# Patient Record
Sex: MALE | Race: WHITE | Employment: OTHER | URBAN - NONMETROPOLITAN AREA
[De-identification: names, ages, dates, MRNs, and addresses within clinical notes are randomized per-mention and may not be internally consistent; named-entity substitution may affect disease eponyms.]

---

## 2021-01-01 ENCOUNTER — APPOINTMENT (OUTPATIENT)
Dept: ULTRASOUND IMAGING | Age: 66
DRG: 207 | End: 2021-01-01
Payer: MEDICARE

## 2021-01-01 ENCOUNTER — APPOINTMENT (OUTPATIENT)
Dept: GENERAL RADIOLOGY | Age: 66
DRG: 207 | End: 2021-01-01
Payer: MEDICARE

## 2021-01-01 ENCOUNTER — APPOINTMENT (OUTPATIENT)
Dept: CT IMAGING | Age: 66
DRG: 207 | End: 2021-01-01
Payer: MEDICARE

## 2021-01-01 ENCOUNTER — NURSE ONLY (OUTPATIENT)
Dept: LAB | Age: 66
End: 2021-01-01

## 2021-01-01 ENCOUNTER — HOSPITAL ENCOUNTER (INPATIENT)
Age: 66
LOS: 23 days | DRG: 207 | End: 2021-12-28
Attending: INTERNAL MEDICINE | Admitting: INTERNAL MEDICINE
Payer: MEDICARE

## 2021-01-01 VITALS
RESPIRATION RATE: 25 BRPM | BODY MASS INDEX: 32.23 KG/M2 | WEIGHT: 243.17 LBS | HEART RATE: 32 BPM | TEMPERATURE: 97.6 F | DIASTOLIC BLOOD PRESSURE: 51 MMHG | OXYGEN SATURATION: 81 % | HEIGHT: 73 IN | SYSTOLIC BLOOD PRESSURE: 94 MMHG

## 2021-01-01 DIAGNOSIS — U07.1 COVID-19: Primary | ICD-10-CM

## 2021-01-01 LAB
ACINETOBACTER CALCOACETICUS-BAUMANNII BY PCR: NOT DETECTED
ADENOVIRUS BY PCR: NOT DETECTED
ALBUMIN SERPL-MCNC: 1.8 G/DL (ref 3.5–5.1)
ALBUMIN SERPL-MCNC: 1.8 G/DL (ref 3.5–5.1)
ALBUMIN SERPL-MCNC: 1.9 G/DL (ref 3.5–5.1)
ALBUMIN SERPL-MCNC: 2 G/DL (ref 3.5–5.1)
ALBUMIN SERPL-MCNC: 2.2 G/DL (ref 3.5–5.1)
ALBUMIN SERPL-MCNC: 2.3 G/DL (ref 3.5–5.1)
ALBUMIN SERPL-MCNC: 2.8 G/DL (ref 3.5–5.1)
ALBUMIN SERPL-MCNC: 2.9 G/DL (ref 3.5–5.1)
ALBUMIN SERPL-MCNC: 3.2 G/DL (ref 3.5–5.1)
ALBUMIN SERPL-MCNC: 4.6 G/DL (ref 3.5–5.1)
ALLEN TEST: ABNORMAL
ALLEN TEST: ABNORMAL
ALLEN TEST: POSITIVE
ALP BLD-CCNC: 107 U/L (ref 38–126)
ALP BLD-CCNC: 107 U/L (ref 38–126)
ALP BLD-CCNC: 123 U/L (ref 38–126)
ALP BLD-CCNC: 126 U/L (ref 38–126)
ALP BLD-CCNC: 128 U/L (ref 38–126)
ALP BLD-CCNC: 133 U/L (ref 38–126)
ALP BLD-CCNC: 136 U/L (ref 38–126)
ALP BLD-CCNC: 147 U/L (ref 38–126)
ALP BLD-CCNC: 168 U/L (ref 38–126)
ALP BLD-CCNC: 191 U/L (ref 38–126)
ALP BLD-CCNC: 203 U/L (ref 38–126)
ALP BLD-CCNC: 219 U/L (ref 38–126)
ALP BLD-CCNC: 234 U/L (ref 38–126)
ALT SERPL-CCNC: 122 U/L (ref 11–66)
ALT SERPL-CCNC: 19 U/L (ref 11–66)
ALT SERPL-CCNC: 20 U/L (ref 11–66)
ALT SERPL-CCNC: 21 U/L (ref 11–66)
ALT SERPL-CCNC: 25 U/L (ref 11–66)
ALT SERPL-CCNC: 33 U/L (ref 11–66)
ALT SERPL-CCNC: 34 U/L (ref 11–66)
ALT SERPL-CCNC: 34 U/L (ref 11–66)
ALT SERPL-CCNC: 35 U/L (ref 11–66)
ALT SERPL-CCNC: 38 U/L (ref 11–66)
ALT SERPL-CCNC: 69 U/L (ref 11–66)
ALT SERPL-CCNC: 86 U/L (ref 11–66)
ALT SERPL-CCNC: 88 U/L (ref 11–66)
AMMONIA: 58 UMOL/L (ref 11–60)
AMORPHOUS: ABNORMAL
ANION GAP SERPL CALCULATED.3IONS-SCNC: 10 MEQ/L (ref 8–16)
ANION GAP SERPL CALCULATED.3IONS-SCNC: 11 MEQ/L (ref 8–16)
ANION GAP SERPL CALCULATED.3IONS-SCNC: 12 MEQ/L (ref 8–16)
ANION GAP SERPL CALCULATED.3IONS-SCNC: 12 MEQ/L (ref 8–16)
ANION GAP SERPL CALCULATED.3IONS-SCNC: 13 MEQ/L (ref 8–16)
ANION GAP SERPL CALCULATED.3IONS-SCNC: 15 MEQ/L (ref 8–16)
ANION GAP SERPL CALCULATED.3IONS-SCNC: 15 MEQ/L (ref 8–16)
ANION GAP SERPL CALCULATED.3IONS-SCNC: 16 MEQ/L (ref 8–16)
ANION GAP SERPL CALCULATED.3IONS-SCNC: 16 MEQ/L (ref 8–16)
ANION GAP SERPL CALCULATED.3IONS-SCNC: 4 MEQ/L (ref 8–16)
ANION GAP SERPL CALCULATED.3IONS-SCNC: 5 MEQ/L (ref 8–16)
ANION GAP SERPL CALCULATED.3IONS-SCNC: 5 MEQ/L (ref 8–16)
ANION GAP SERPL CALCULATED.3IONS-SCNC: 6 MEQ/L (ref 8–16)
ANION GAP SERPL CALCULATED.3IONS-SCNC: 7 MEQ/L (ref 8–16)
ANION GAP SERPL CALCULATED.3IONS-SCNC: 8 MEQ/L (ref 8–16)
ANION GAP SERPL CALCULATED.3IONS-SCNC: 9 MEQ/L (ref 8–16)
APTT: 153.7 SECONDS (ref 22–38)
APTT: 41.4 SECONDS (ref 22–38)
APTT: 56.1 SECONDS (ref 22–38)
APTT: 65.9 SECONDS (ref 22–38)
APTT: 80.5 SECONDS (ref 22–38)
APTT: 86.8 SECONDS (ref 22–38)
APTT: 88.4 SECONDS (ref 22–38)
APTT: > 200 SECONDS (ref 22–38)
AST SERPL-CCNC: 121 U/L (ref 5–40)
AST SERPL-CCNC: 25 U/L (ref 5–40)
AST SERPL-CCNC: 29 U/L (ref 5–40)
AST SERPL-CCNC: 30 U/L (ref 5–40)
AST SERPL-CCNC: 37 U/L (ref 5–40)
AST SERPL-CCNC: 40 U/L (ref 5–40)
AST SERPL-CCNC: 40 U/L (ref 5–40)
AST SERPL-CCNC: 41 U/L (ref 5–40)
AST SERPL-CCNC: 47 U/L (ref 5–40)
AST SERPL-CCNC: 66 U/L (ref 5–40)
AST SERPL-CCNC: 74 U/L (ref 5–40)
BACTERIA: ABNORMAL
BACTERIA: ABNORMAL /HPF
BASE EXCESS (CALCULATED): -0.4 MMOL/L (ref -2.5–2.5)
BASE EXCESS (CALCULATED): -2 MMOL/L (ref -2.5–2.5)
BASE EXCESS (CALCULATED): 1.4 MMOL/L (ref -2.5–2.5)
BASE EXCESS (CALCULATED): 1.7 MMOL/L (ref -2.5–2.5)
BASE EXCESS (CALCULATED): 2.8 MMOL/L (ref -2.5–2.5)
BASE EXCESS (CALCULATED): 3.8 MMOL/L (ref -2.5–2.5)
BASE EXCESS (CALCULATED): 6.2 MMOL/L (ref -2.5–2.5)
BASOPHILIA: ABNORMAL
BASOPHILS # BLD: 0.1 %
BASOPHILS # BLD: 0.2 %
BASOPHILS # BLD: 0.3 %
BASOPHILS # BLD: 0.3 %
BASOPHILS ABSOLUTE: 0 THOU/MM3 (ref 0–0.1)
BILIRUB SERPL-MCNC: 0.3 MG/DL (ref 0.3–1.2)
BILIRUB SERPL-MCNC: 0.3 MG/DL (ref 0.3–1.2)
BILIRUB SERPL-MCNC: 0.4 MG/DL (ref 0.3–1.2)
BILIRUB SERPL-MCNC: 0.4 MG/DL (ref 0.3–1.2)
BILIRUB SERPL-MCNC: 0.5 MG/DL (ref 0.3–1.2)
BILIRUB SERPL-MCNC: 0.6 MG/DL (ref 0.3–1.2)
BILIRUB SERPL-MCNC: 0.6 MG/DL (ref 0.3–1.2)
BILIRUB SERPL-MCNC: 0.7 MG/DL (ref 0.3–1.2)
BILIRUB SERPL-MCNC: 0.8 MG/DL (ref 0.3–1.2)
BILIRUB SERPL-MCNC: 0.9 MG/DL (ref 0.3–1.2)
BILIRUB SERPL-MCNC: 1.2 MG/DL (ref 0.3–1.2)
BILIRUB SERPL-MCNC: 1.4 MG/DL (ref 0.3–1.2)
BILIRUB SERPL-MCNC: 1.4 MG/DL (ref 0.3–1.2)
BILIRUBIN DIRECT: 0.3 MG/DL (ref 0–0.3)
BILIRUBIN DIRECT: 0.5 MG/DL (ref 0–0.3)
BILIRUBIN DIRECT: 1 MG/DL (ref 0–0.3)
BILIRUBIN URINE: ABNORMAL
BILIRUBIN URINE: NEGATIVE
BLOOD CULTURE, ROUTINE: NORMAL
BLOOD, URINE: ABNORMAL
BLOOD, URINE: NEGATIVE
BUN BLDV-MCNC: 16 MG/DL (ref 7–22)
BUN BLDV-MCNC: 17 MG/DL (ref 7–22)
BUN BLDV-MCNC: 20 MG/DL (ref 7–22)
BUN BLDV-MCNC: 21 MG/DL (ref 7–22)
BUN BLDV-MCNC: 26 MG/DL (ref 7–22)
BUN BLDV-MCNC: 28 MG/DL (ref 7–22)
BUN BLDV-MCNC: 29 MG/DL (ref 7–22)
BUN BLDV-MCNC: 29 MG/DL (ref 7–22)
BUN BLDV-MCNC: 31 MG/DL (ref 7–22)
BUN BLDV-MCNC: 35 MG/DL (ref 7–22)
BUN BLDV-MCNC: 35 MG/DL (ref 7–22)
BUN BLDV-MCNC: 37 MG/DL (ref 7–22)
BUN BLDV-MCNC: 38 MG/DL (ref 7–22)
BUN BLDV-MCNC: 39 MG/DL (ref 7–22)
BUN BLDV-MCNC: 44 MG/DL (ref 7–22)
BUN BLDV-MCNC: 44 MG/DL (ref 7–22)
BUN BLDV-MCNC: 46 MG/DL (ref 7–22)
BUN BLDV-MCNC: 50 MG/DL (ref 7–22)
BUN BLDV-MCNC: 52 MG/DL (ref 7–22)
BUN BLDV-MCNC: 53 MG/DL (ref 7–22)
BUN BLDV-MCNC: 55 MG/DL (ref 7–22)
BUN BLDV-MCNC: 55 MG/DL (ref 7–22)
BUN BLDV-MCNC: 57 MG/DL (ref 7–22)
BUN BLDV-MCNC: 58 MG/DL (ref 7–22)
BUN BLDV-MCNC: 59 MG/DL (ref 7–22)
C-REACTIVE PROTEIN: 11.74 MG/DL (ref 0–1)
C-REACTIVE PROTEIN: 11.78 MG/DL (ref 0–1)
C-REACTIVE PROTEIN: 13.09 MG/DL (ref 0–1)
C-REACTIVE PROTEIN: 13.49 MG/DL (ref 0–1)
C-REACTIVE PROTEIN: 16.58 MG/DL (ref 0–1)
C-REACTIVE PROTEIN: 18.36 MG/DL (ref 0–1)
C-REACTIVE PROTEIN: 22.27 MG/DL (ref 0–1)
C-REACTIVE PROTEIN: 23.97 MG/DL (ref 0–1)
C-REACTIVE PROTEIN: 25.91 MG/DL (ref 0–1)
C-REACTIVE PROTEIN: 4.72 MG/DL (ref 0–1)
C-REACTIVE PROTEIN: 5.74 MG/DL (ref 0–1)
C-REACTIVE PROTEIN: 5.79 MG/DL (ref 0–1)
C-REACTIVE PROTEIN: 6.36 MG/DL (ref 0–1)
C-REACTIVE PROTEIN: 7.16 MG/DL (ref 0–1)
C-REACTIVE PROTEIN: 7.79 MG/DL (ref 0–1)
C-REACTIVE PROTEIN: 9.58 MG/DL (ref 0–1)
CALCIUM IONIZED: 1.16 MMOL/L (ref 1.12–1.32)
CALCIUM IONIZED: 1.21 MMOL/L (ref 1.12–1.32)
CALCIUM SERPL-MCNC: 10 MG/DL (ref 8.5–10.5)
CALCIUM SERPL-MCNC: 7.4 MG/DL (ref 8.5–10.5)
CALCIUM SERPL-MCNC: 7.6 MG/DL (ref 8.5–10.5)
CALCIUM SERPL-MCNC: 7.6 MG/DL (ref 8.5–10.5)
CALCIUM SERPL-MCNC: 7.8 MG/DL (ref 8.5–10.5)
CALCIUM SERPL-MCNC: 7.9 MG/DL (ref 8.5–10.5)
CALCIUM SERPL-MCNC: 7.9 MG/DL (ref 8.5–10.5)
CALCIUM SERPL-MCNC: 8 MG/DL (ref 8.5–10.5)
CALCIUM SERPL-MCNC: 8 MG/DL (ref 8.5–10.5)
CALCIUM SERPL-MCNC: 8.1 MG/DL (ref 8.5–10.5)
CALCIUM SERPL-MCNC: 8.2 MG/DL (ref 8.5–10.5)
CALCIUM SERPL-MCNC: 8.3 MG/DL (ref 8.5–10.5)
CALCIUM SERPL-MCNC: 8.3 MG/DL (ref 8.5–10.5)
CALCIUM SERPL-MCNC: 8.4 MG/DL (ref 8.5–10.5)
CALCIUM SERPL-MCNC: 8.4 MG/DL (ref 8.5–10.5)
CALCIUM SERPL-MCNC: 8.6 MG/DL (ref 8.5–10.5)
CALCIUM SERPL-MCNC: 8.8 MG/DL (ref 8.5–10.5)
CALCIUM SERPL-MCNC: 8.9 MG/DL (ref 8.5–10.5)
CALCIUM SERPL-MCNC: 9.1 MG/DL (ref 8.5–10.5)
CASTS 2: ABNORMAL /LPF
CASTS UA: ABNORMAL /LPF
CASTS: ABNORMAL /LPF
CASTS: ABNORMAL /LPF
CHARACTER, URINE: ABNORMAL
CHARACTER, URINE: ABNORMAL
CHLAMYDIA PNEUMONIAE BY PCR: NOT DETECTED
CHLORIDE BLD-SCNC: 100 MEQ/L (ref 98–111)
CHLORIDE BLD-SCNC: 101 MEQ/L (ref 98–111)
CHLORIDE BLD-SCNC: 101 MEQ/L (ref 98–111)
CHLORIDE BLD-SCNC: 102 MEQ/L (ref 98–111)
CHLORIDE BLD-SCNC: 105 MEQ/L (ref 98–111)
CHLORIDE BLD-SCNC: 105 MEQ/L (ref 98–111)
CHLORIDE BLD-SCNC: 108 MEQ/L (ref 98–111)
CHLORIDE BLD-SCNC: 109 MEQ/L (ref 98–111)
CHLORIDE BLD-SCNC: 110 MEQ/L (ref 98–111)
CHLORIDE BLD-SCNC: 110 MEQ/L (ref 98–111)
CHLORIDE BLD-SCNC: 111 MEQ/L (ref 98–111)
CHLORIDE BLD-SCNC: 112 MEQ/L (ref 98–111)
CHLORIDE BLD-SCNC: 115 MEQ/L (ref 98–111)
CHLORIDE BLD-SCNC: 93 MEQ/L (ref 98–111)
CHLORIDE BLD-SCNC: 94 MEQ/L (ref 98–111)
CHLORIDE BLD-SCNC: 95 MEQ/L (ref 98–111)
CHLORIDE BLD-SCNC: 96 MEQ/L (ref 98–111)
CHLORIDE BLD-SCNC: 97 MEQ/L (ref 98–111)
CHOLESTEROL, TOTAL: 130 MG/DL (ref 100–199)
CO2: 20 MEQ/L (ref 23–33)
CO2: 20 MEQ/L (ref 23–33)
CO2: 21 MEQ/L (ref 23–33)
CO2: 22 MEQ/L (ref 23–33)
CO2: 22 MEQ/L (ref 23–33)
CO2: 23 MEQ/L (ref 23–33)
CO2: 24 MEQ/L (ref 23–33)
CO2: 25 MEQ/L (ref 23–33)
CO2: 25 MEQ/L (ref 23–33)
CO2: 26 MEQ/L (ref 23–33)
CO2: 26 MEQ/L (ref 23–33)
CO2: 27 MEQ/L (ref 23–33)
CO2: 28 MEQ/L (ref 23–33)
COLLECTED BY:: ABNORMAL
COLOR: ABNORMAL
COLOR: ABNORMAL
CREAT SERPL-MCNC: 0.6 MG/DL (ref 0.4–1.2)
CREAT SERPL-MCNC: 0.7 MG/DL (ref 0.4–1.2)
CREAT SERPL-MCNC: 0.7 MG/DL (ref 0.4–1.2)
CREAT SERPL-MCNC: 0.8 MG/DL (ref 0.4–1.2)
CREAT SERPL-MCNC: 0.9 MG/DL (ref 0.4–1.2)
CREAT SERPL-MCNC: 1 MG/DL (ref 0.4–1.2)
CREAT SERPL-MCNC: 1.2 MG/DL (ref 0.4–1.2)
CREAT SERPL-MCNC: 1.2 MG/DL (ref 0.4–1.2)
CREAT SERPL-MCNC: 1.3 MG/DL (ref 0.4–1.2)
CREAT SERPL-MCNC: 1.5 MG/DL (ref 0.4–1.2)
CREAT SERPL-MCNC: 1.6 MG/DL (ref 0.4–1.2)
CREAT SERPL-MCNC: 1.8 MG/DL (ref 0.4–1.2)
CREAT SERPL-MCNC: 1.9 MG/DL (ref 0.4–1.2)
CREAT SERPL-MCNC: 1.9 MG/DL (ref 0.4–1.2)
CREAT SERPL-MCNC: 2.1 MG/DL (ref 0.4–1.2)
CREATININE URINE: 57.2 MG/DL
CREATININE URINE: 76.7 MG/DL
CRENATED RBC'S: ABNORMAL
CRYSTALS, UA: ABNORMAL
CRYSTALS: ABNORMAL
D-DIMER QUANTITATIVE: ABNORMAL NG/ML FEU (ref 0–500)
DACROCYTES: ABNORMAL
DEVICE: ABNORMAL
EKG ATRIAL RATE: 114 BPM
EKG ATRIAL RATE: 49 BPM
EKG ATRIAL RATE: 96 BPM
EKG P AXIS: -6 DEGREES
EKG P AXIS: 39 DEGREES
EKG P AXIS: 77 DEGREES
EKG P-R INTERVAL: 150 MS
EKG P-R INTERVAL: 158 MS
EKG P-R INTERVAL: 164 MS
EKG Q-T INTERVAL: 288 MS
EKG Q-T INTERVAL: 332 MS
EKG Q-T INTERVAL: 358 MS
EKG Q-T INTERVAL: 492 MS
EKG QRS DURATION: 72 MS
EKG QRS DURATION: 76 MS
EKG QRS DURATION: 80 MS
EKG QRS DURATION: 82 MS
EKG QTC CALCULATION (BAZETT): 444 MS
EKG QTC CALCULATION (BAZETT): 452 MS
EKG QTC CALCULATION (BAZETT): 456 MS
EKG QTC CALCULATION (BAZETT): 457 MS
EKG R AXIS: 29 DEGREES
EKG R AXIS: 34 DEGREES
EKG R AXIS: 6 DEGREES
EKG R AXIS: 8 DEGREES
EKG T AXIS: -159 DEGREES
EKG T AXIS: -2 DEGREES
EKG T AXIS: 11 DEGREES
EKG T AXIS: 69 DEGREES
EKG VENTRICULAR RATE: 114 BPM
EKG VENTRICULAR RATE: 151 BPM
EKG VENTRICULAR RATE: 49 BPM
EKG VENTRICULAR RATE: 96 BPM
ENTEROBACTER CLOACAE COMPLEX BY PCR: NOT DETECTED
EOSINOPHIL # BLD: 0 %
EOSINOPHIL # BLD: 0.1 %
EOSINOPHIL # BLD: 0.2 %
EOSINOPHIL # BLD: 0.2 %
EOSINOPHIL # BLD: 0.3 %
EOSINOPHIL # BLD: 0.3 %
EOSINOPHIL # BLD: 0.6 %
EOSINOPHIL # BLD: 1.1 %
EOSINOPHIL # BLD: 1.7 %
EOSINOPHILS ABSOLUTE: 0 THOU/MM3 (ref 0–0.4)
EOSINOPHILS ABSOLUTE: 0.1 THOU/MM3 (ref 0–0.4)
EOSINOPHILS ABSOLUTE: 0.1 THOU/MM3 (ref 0–0.4)
EOSINOPHILS ABSOLUTE: 0.2 THOU/MM3 (ref 0–0.4)
EOSINOPHILS ABSOLUTE: 0.3 THOU/MM3 (ref 0–0.4)
EPITHELIAL CELLS, UA: ABNORMAL /HPF
EPITHELIAL CELLS, UA: ABNORMAL /HPF
ERYTHROCYTE [DISTWIDTH] IN BLOOD BY AUTOMATED COUNT: 14.8 % (ref 11.5–14.5)
ERYTHROCYTE [DISTWIDTH] IN BLOOD BY AUTOMATED COUNT: 14.8 % (ref 11.5–14.5)
ERYTHROCYTE [DISTWIDTH] IN BLOOD BY AUTOMATED COUNT: 14.9 % (ref 11.5–14.5)
ERYTHROCYTE [DISTWIDTH] IN BLOOD BY AUTOMATED COUNT: 15 % (ref 11.5–14.5)
ERYTHROCYTE [DISTWIDTH] IN BLOOD BY AUTOMATED COUNT: 15 % (ref 11.5–14.5)
ERYTHROCYTE [DISTWIDTH] IN BLOOD BY AUTOMATED COUNT: 15.1 % (ref 11.5–14.5)
ERYTHROCYTE [DISTWIDTH] IN BLOOD BY AUTOMATED COUNT: 15.2 % (ref 11.5–14.5)
ERYTHROCYTE [DISTWIDTH] IN BLOOD BY AUTOMATED COUNT: 15.3 % (ref 11.5–14.5)
ERYTHROCYTE [DISTWIDTH] IN BLOOD BY AUTOMATED COUNT: 15.4 % (ref 11.5–14.5)
ERYTHROCYTE [DISTWIDTH] IN BLOOD BY AUTOMATED COUNT: 15.6 % (ref 11.5–14.5)
ERYTHROCYTE [DISTWIDTH] IN BLOOD BY AUTOMATED COUNT: 15.6 % (ref 11.5–14.5)
ERYTHROCYTE [DISTWIDTH] IN BLOOD BY AUTOMATED COUNT: 15.7 % (ref 11.5–14.5)
ERYTHROCYTE [DISTWIDTH] IN BLOOD BY AUTOMATED COUNT: 15.7 % (ref 11.5–14.5)
ERYTHROCYTE [DISTWIDTH] IN BLOOD BY AUTOMATED COUNT: 15.8 % (ref 11.5–14.5)
ERYTHROCYTE [DISTWIDTH] IN BLOOD BY AUTOMATED COUNT: 16 % (ref 11.5–14.5)
ERYTHROCYTE [DISTWIDTH] IN BLOOD BY AUTOMATED COUNT: 16.4 % (ref 11.5–14.5)
ERYTHROCYTE [DISTWIDTH] IN BLOOD BY AUTOMATED COUNT: 16.9 % (ref 11.5–14.5)
ERYTHROCYTE [DISTWIDTH] IN BLOOD BY AUTOMATED COUNT: 48 FL (ref 35–45)
ERYTHROCYTE [DISTWIDTH] IN BLOOD BY AUTOMATED COUNT: 48.3 FL (ref 35–45)
ERYTHROCYTE [DISTWIDTH] IN BLOOD BY AUTOMATED COUNT: 48.4 FL (ref 35–45)
ERYTHROCYTE [DISTWIDTH] IN BLOOD BY AUTOMATED COUNT: 49.5 FL (ref 35–45)
ERYTHROCYTE [DISTWIDTH] IN BLOOD BY AUTOMATED COUNT: 49.6 FL (ref 35–45)
ERYTHROCYTE [DISTWIDTH] IN BLOOD BY AUTOMATED COUNT: 49.7 FL (ref 35–45)
ERYTHROCYTE [DISTWIDTH] IN BLOOD BY AUTOMATED COUNT: 51.1 FL (ref 35–45)
ERYTHROCYTE [DISTWIDTH] IN BLOOD BY AUTOMATED COUNT: 51.2 FL (ref 35–45)
ERYTHROCYTE [DISTWIDTH] IN BLOOD BY AUTOMATED COUNT: 51.8 FL (ref 35–45)
ERYTHROCYTE [DISTWIDTH] IN BLOOD BY AUTOMATED COUNT: 52.7 FL (ref 35–45)
ERYTHROCYTE [DISTWIDTH] IN BLOOD BY AUTOMATED COUNT: 54 FL (ref 35–45)
ERYTHROCYTE [DISTWIDTH] IN BLOOD BY AUTOMATED COUNT: 54.7 FL (ref 35–45)
ERYTHROCYTE [DISTWIDTH] IN BLOOD BY AUTOMATED COUNT: 54.8 FL (ref 35–45)
ERYTHROCYTE [DISTWIDTH] IN BLOOD BY AUTOMATED COUNT: 55.8 FL (ref 35–45)
ERYTHROCYTE [DISTWIDTH] IN BLOOD BY AUTOMATED COUNT: 56.3 FL (ref 35–45)
ERYTHROCYTE [DISTWIDTH] IN BLOOD BY AUTOMATED COUNT: 56.8 FL (ref 35–45)
ERYTHROCYTE [DISTWIDTH] IN BLOOD BY AUTOMATED COUNT: 57.1 FL (ref 35–45)
ERYTHROCYTE [DISTWIDTH] IN BLOOD BY AUTOMATED COUNT: 57.5 FL (ref 35–45)
ERYTHROCYTE [DISTWIDTH] IN BLOOD BY AUTOMATED COUNT: 59.4 FL (ref 35–45)
ERYTHROCYTE [DISTWIDTH] IN BLOOD BY AUTOMATED COUNT: 60.2 FL (ref 35–45)
ESCHERICHIA COLI BY PCR: DETECTED
FERRITIN: 2399 NG/ML (ref 22–322)
FERRITIN: 4091 NG/ML (ref 22–322)
GFR SERPL CREATININE-BSD FRML MDRD: 32 ML/MIN/1.73M2
GFR SERPL CREATININE-BSD FRML MDRD: 36 ML/MIN/1.73M2
GFR SERPL CREATININE-BSD FRML MDRD: 36 ML/MIN/1.73M2
GFR SERPL CREATININE-BSD FRML MDRD: 38 ML/MIN/1.73M2
GFR SERPL CREATININE-BSD FRML MDRD: 43 ML/MIN/1.73M2
GFR SERPL CREATININE-BSD FRML MDRD: 47 ML/MIN/1.73M2
GFR SERPL CREATININE-BSD FRML MDRD: 55 ML/MIN/1.73M2
GFR SERPL CREATININE-BSD FRML MDRD: 60 ML/MIN/1.73M2
GFR SERPL CREATININE-BSD FRML MDRD: 60 ML/MIN/1.73M2
GFR SERPL CREATININE-BSD FRML MDRD: 75 ML/MIN/1.73M2
GFR SERPL CREATININE-BSD FRML MDRD: 84 ML/MIN/1.73M2
GFR SERPL CREATININE-BSD FRML MDRD: > 90 ML/MIN/1.73M2
GLUCOSE BLD-MCNC: 102 MG/DL (ref 70–108)
GLUCOSE BLD-MCNC: 103 MG/DL (ref 70–108)
GLUCOSE BLD-MCNC: 104 MG/DL (ref 70–108)
GLUCOSE BLD-MCNC: 105 MG/DL (ref 70–108)
GLUCOSE BLD-MCNC: 106 MG/DL (ref 70–108)
GLUCOSE BLD-MCNC: 107 MG/DL (ref 70–108)
GLUCOSE BLD-MCNC: 108 MG/DL (ref 70–108)
GLUCOSE BLD-MCNC: 108 MG/DL (ref 70–108)
GLUCOSE BLD-MCNC: 109 MG/DL (ref 70–108)
GLUCOSE BLD-MCNC: 111 MG/DL (ref 70–108)
GLUCOSE BLD-MCNC: 115 MG/DL (ref 70–108)
GLUCOSE BLD-MCNC: 116 MG/DL (ref 70–108)
GLUCOSE BLD-MCNC: 116 MG/DL (ref 70–108)
GLUCOSE BLD-MCNC: 118 MG/DL (ref 70–108)
GLUCOSE BLD-MCNC: 118 MG/DL (ref 70–108)
GLUCOSE BLD-MCNC: 119 MG/DL (ref 70–108)
GLUCOSE BLD-MCNC: 120 MG/DL (ref 70–108)
GLUCOSE BLD-MCNC: 122 MG/DL (ref 70–108)
GLUCOSE BLD-MCNC: 123 MG/DL (ref 70–108)
GLUCOSE BLD-MCNC: 124 MG/DL (ref 70–108)
GLUCOSE BLD-MCNC: 125 MG/DL (ref 70–108)
GLUCOSE BLD-MCNC: 126 MG/DL (ref 70–108)
GLUCOSE BLD-MCNC: 127 MG/DL (ref 70–108)
GLUCOSE BLD-MCNC: 128 MG/DL (ref 70–108)
GLUCOSE BLD-MCNC: 129 MG/DL (ref 70–108)
GLUCOSE BLD-MCNC: 131 MG/DL (ref 70–108)
GLUCOSE BLD-MCNC: 133 MG/DL (ref 70–108)
GLUCOSE BLD-MCNC: 136 MG/DL (ref 70–108)
GLUCOSE BLD-MCNC: 137 MG/DL (ref 70–108)
GLUCOSE BLD-MCNC: 137 MG/DL (ref 70–108)
GLUCOSE BLD-MCNC: 138 MG/DL (ref 70–108)
GLUCOSE BLD-MCNC: 140 MG/DL (ref 70–108)
GLUCOSE BLD-MCNC: 142 MG/DL (ref 70–108)
GLUCOSE BLD-MCNC: 143 MG/DL (ref 70–108)
GLUCOSE BLD-MCNC: 143 MG/DL (ref 70–108)
GLUCOSE BLD-MCNC: 148 MG/DL (ref 70–108)
GLUCOSE BLD-MCNC: 148 MG/DL (ref 70–108)
GLUCOSE BLD-MCNC: 149 MG/DL (ref 70–108)
GLUCOSE BLD-MCNC: 151 MG/DL (ref 70–108)
GLUCOSE BLD-MCNC: 152 MG/DL (ref 70–108)
GLUCOSE BLD-MCNC: 153 MG/DL (ref 70–108)
GLUCOSE BLD-MCNC: 154 MG/DL (ref 70–108)
GLUCOSE BLD-MCNC: 154 MG/DL (ref 70–108)
GLUCOSE BLD-MCNC: 156 MG/DL (ref 70–108)
GLUCOSE BLD-MCNC: 157 MG/DL (ref 70–108)
GLUCOSE BLD-MCNC: 158 MG/DL (ref 70–108)
GLUCOSE BLD-MCNC: 159 MG/DL (ref 70–108)
GLUCOSE BLD-MCNC: 160 MG/DL (ref 70–108)
GLUCOSE BLD-MCNC: 163 MG/DL (ref 70–108)
GLUCOSE BLD-MCNC: 163 MG/DL (ref 70–108)
GLUCOSE BLD-MCNC: 166 MG/DL (ref 70–108)
GLUCOSE BLD-MCNC: 174 MG/DL (ref 70–108)
GLUCOSE BLD-MCNC: 177 MG/DL (ref 70–108)
GLUCOSE BLD-MCNC: 179 MG/DL (ref 70–108)
GLUCOSE BLD-MCNC: 74 MG/DL (ref 70–108)
GLUCOSE BLD-MCNC: 82 MG/DL (ref 70–108)
GLUCOSE BLD-MCNC: 84 MG/DL (ref 70–108)
GLUCOSE BLD-MCNC: 85 MG/DL (ref 70–108)
GLUCOSE BLD-MCNC: 86 MG/DL (ref 70–108)
GLUCOSE BLD-MCNC: 87 MG/DL (ref 70–108)
GLUCOSE BLD-MCNC: 92 MG/DL (ref 70–108)
GLUCOSE BLD-MCNC: 94 MG/DL (ref 70–108)
GLUCOSE BLD-MCNC: 96 MG/DL (ref 70–108)
GLUCOSE BLD-MCNC: 96 MG/DL (ref 70–108)
GLUCOSE BLD-MCNC: 99 MG/DL (ref 70–108)
GLUCOSE URINE: NEGATIVE MG/DL
GLUCOSE, URINE: NEGATIVE MG/DL
GRAM STAIN RESULT: ABNORMAL
HAEMOPHILUS INFLUENZAE BY PCR: NOT DETECTED
HCO3: 22 MMOL/L (ref 23–28)
HCO3: 22 MMOL/L (ref 23–28)
HCO3: 24 MMOL/L (ref 23–28)
HCO3: 25 MMOL/L (ref 23–28)
HCO3: 30 MMOL/L (ref 23–28)
HCO3: 31 MMOL/L (ref 23–28)
HCO3: 33 MMOL/L (ref 23–28)
HCT VFR BLD CALC: 30 % (ref 42–52)
HCT VFR BLD CALC: 32.1 % (ref 42–52)
HCT VFR BLD CALC: 32.5 % (ref 42–52)
HCT VFR BLD CALC: 32.9 % (ref 42–52)
HCT VFR BLD CALC: 32.9 % (ref 42–52)
HCT VFR BLD CALC: 33 % (ref 42–52)
HCT VFR BLD CALC: 33.5 % (ref 42–52)
HCT VFR BLD CALC: 33.9 % (ref 42–52)
HCT VFR BLD CALC: 34.5 % (ref 42–52)
HCT VFR BLD CALC: 34.8 % (ref 42–52)
HCT VFR BLD CALC: 35.2 % (ref 42–52)
HCT VFR BLD CALC: 36.9 % (ref 42–52)
HCT VFR BLD CALC: 38.2 % (ref 42–52)
HCT VFR BLD CALC: 39.2 % (ref 42–52)
HCT VFR BLD CALC: 39.5 % (ref 42–52)
HCT VFR BLD CALC: 40.3 % (ref 42–52)
HCT VFR BLD CALC: 41.1 % (ref 42–52)
HCT VFR BLD CALC: 42.2 % (ref 42–52)
HCT VFR BLD CALC: 42.3 % (ref 42–52)
HCT VFR BLD CALC: 43.1 % (ref 42–52)
HDLC SERPL-MCNC: 30 MG/DL
HEMOGLOBIN: 10 GM/DL (ref 14–18)
HEMOGLOBIN: 10 GM/DL (ref 14–18)
HEMOGLOBIN: 10.2 GM/DL (ref 14–18)
HEMOGLOBIN: 10.4 GM/DL (ref 14–18)
HEMOGLOBIN: 10.5 GM/DL (ref 14–18)
HEMOGLOBIN: 10.7 GM/DL (ref 14–18)
HEMOGLOBIN: 11 GM/DL (ref 14–18)
HEMOGLOBIN: 11.2 GM/DL (ref 14–18)
HEMOGLOBIN: 11.2 GM/DL (ref 14–18)
HEMOGLOBIN: 11.7 GM/DL (ref 14–18)
HEMOGLOBIN: 13 GM/DL (ref 14–18)
HEMOGLOBIN: 13 GM/DL (ref 14–18)
HEMOGLOBIN: 13.7 GM/DL (ref 14–18)
HEMOGLOBIN: 13.8 GM/DL (ref 14–18)
HEMOGLOBIN: 13.8 GM/DL (ref 14–18)
HEMOGLOBIN: 14.3 GM/DL (ref 14–18)
HEMOGLOBIN: 14.8 GM/DL (ref 14–18)
HEMOGLOBIN: 14.9 GM/DL (ref 14–18)
HEMOGLOBIN: 9.3 GM/DL (ref 14–18)
HEMOGLOBIN: 9.9 GM/DL (ref 14–18)
HEPARIN UNFRACTIONATED: 0.88 U/ML (ref 0.3–0.7)
ICTOTEST: POSITIVE
IFIO2: 100
IFIO2: 70
IFIO2: 80
IFIO2: 90
IMMATURE GRANS (ABS): 0.08 THOU/MM3 (ref 0–0.07)
IMMATURE GRANS (ABS): 0.09 THOU/MM3 (ref 0–0.07)
IMMATURE GRANS (ABS): 0.11 THOU/MM3 (ref 0–0.07)
IMMATURE GRANS (ABS): 0.13 THOU/MM3 (ref 0–0.07)
IMMATURE GRANS (ABS): 0.14 THOU/MM3 (ref 0–0.07)
IMMATURE GRANS (ABS): 0.15 THOU/MM3 (ref 0–0.07)
IMMATURE GRANS (ABS): 0.15 THOU/MM3 (ref 0–0.07)
IMMATURE GRANS (ABS): 0.18 THOU/MM3 (ref 0–0.07)
IMMATURE GRANS (ABS): 0.18 THOU/MM3 (ref 0–0.07)
IMMATURE GRANS (ABS): 0.2 THOU/MM3 (ref 0–0.07)
IMMATURE GRANS (ABS): 0.2 THOU/MM3 (ref 0–0.07)
IMMATURE GRANS (ABS): 0.23 THOU/MM3 (ref 0–0.07)
IMMATURE GRANS (ABS): 0.26 THOU/MM3 (ref 0–0.07)
IMMATURE GRANS (ABS): 0.26 THOU/MM3 (ref 0–0.07)
IMMATURE GRANS (ABS): 0.31 THOU/MM3 (ref 0–0.07)
IMMATURE GRANS (ABS): 0.35 THOU/MM3 (ref 0–0.07)
IMMATURE GRANS (ABS): 0.4 THOU/MM3 (ref 0–0.07)
IMMATURE GRANS (ABS): 0.45 THOU/MM3 (ref 0–0.07)
IMMATURE GRANS (ABS): 0.59 THOU/MM3 (ref 0–0.07)
IMMATURE GRANULOCYTES: 0.6 %
IMMATURE GRANULOCYTES: 0.8 %
IMMATURE GRANULOCYTES: 1.1 %
IMMATURE GRANULOCYTES: 1.2 %
IMMATURE GRANULOCYTES: 1.2 %
IMMATURE GRANULOCYTES: 1.4 %
IMMATURE GRANULOCYTES: 1.6 %
IMMATURE GRANULOCYTES: 1.6 %
IMMATURE GRANULOCYTES: 2.1 %
IMMATURE GRANULOCYTES: 2.2 %
IMMATURE GRANULOCYTES: 2.3 %
IMMATURE GRANULOCYTES: 2.3 %
IMMATURE GRANULOCYTES: 2.5 %
IMMATURE GRANULOCYTES: 2.6 %
IMMATURE GRANULOCYTES: 2.6 %
IMMATURE GRANULOCYTES: 2.8 %
IMMATURE GRANULOCYTES: 4.4 %
INFLUENZA A BY PCR: NOT DETECTED
INFLUENZA B BY PCR: NOT DETECTED
INR BLD: 1.31 (ref 0.85–1.13)
KETONES, URINE: NEGATIVE
KETONES, URINE: NEGATIVE
KLEBSIELLA AEROGENES BY PCR: DETECTED
KLEBSIELLA OXYTOCA BY PCR: NOT DETECTED
KLEBSIELLA PNEUMONIAE GROUP BY PCR: NOT DETECTED
LACTIC ACID, SEPSIS: 2.1 MMOL/L (ref 0.5–1.9)
LACTIC ACID, SEPSIS: 2.3 MMOL/L (ref 0.5–1.9)
LACTIC ACID: 1.4 MMOL/L (ref 0.5–2)
LACTIC ACID: 1.5 MMOL/L (ref 0.5–2)
LACTIC ACID: 2.3 MMOL/L (ref 0.5–2)
LD: 574 U/L (ref 100–190)
LDL CHOLESTEROL CALCULATED: 57 MG/DL
LEGIONELLA PNEUMOPHILIA BY PCR: NOT DETECTED
LEUKOCYTE EST, POC: NEGATIVE
LEUKOCYTE ESTERASE, URINE: ABNORMAL
LV EF: 50 %
LVEF MODALITY: NORMAL
LYMPHOCYTES # BLD: 1.1 %
LYMPHOCYTES # BLD: 1.2 %
LYMPHOCYTES # BLD: 1.7 %
LYMPHOCYTES # BLD: 2.3 %
LYMPHOCYTES # BLD: 2.4 %
LYMPHOCYTES # BLD: 2.5 %
LYMPHOCYTES # BLD: 2.6 %
LYMPHOCYTES # BLD: 2.6 %
LYMPHOCYTES # BLD: 2.9 %
LYMPHOCYTES # BLD: 3.1 %
LYMPHOCYTES # BLD: 3.3 %
LYMPHOCYTES # BLD: 3.4 %
LYMPHOCYTES # BLD: 3.8 %
LYMPHOCYTES # BLD: 3.9 %
LYMPHOCYTES # BLD: 4.8 %
LYMPHOCYTES # BLD: 5.7 %
LYMPHOCYTES # BLD: 6.2 %
LYMPHOCYTES ABSOLUTE: 0.1 THOU/MM3 (ref 1–4.8)
LYMPHOCYTES ABSOLUTE: 0.2 THOU/MM3 (ref 1–4.8)
LYMPHOCYTES ABSOLUTE: 0.3 THOU/MM3 (ref 1–4.8)
LYMPHOCYTES ABSOLUTE: 0.4 THOU/MM3 (ref 1–4.8)
LYMPHOCYTES ABSOLUTE: 0.4 THOU/MM3 (ref 1–4.8)
LYMPHOCYTES ABSOLUTE: 0.5 THOU/MM3 (ref 1–4.8)
LYMPHOCYTES ABSOLUTE: 0.6 THOU/MM3 (ref 1–4.8)
LYMPHOCYTES ABSOLUTE: 0.7 THOU/MM3 (ref 1–4.8)
MAGNESIUM: 1.6 MG/DL (ref 1.6–2.4)
MAGNESIUM: 1.6 MG/DL (ref 1.6–2.4)
MAGNESIUM: 1.8 MG/DL (ref 1.6–2.4)
MAGNESIUM: 1.8 MG/DL (ref 1.6–2.4)
MAGNESIUM: 1.9 MG/DL (ref 1.6–2.4)
MAGNESIUM: 1.9 MG/DL (ref 1.6–2.4)
MAGNESIUM: 2 MG/DL (ref 1.6–2.4)
MAGNESIUM: 2 MG/DL (ref 1.6–2.4)
MAGNESIUM: 2.3 MG/DL (ref 1.6–2.4)
MAGNESIUM: 2.4 MG/DL (ref 1.6–2.4)
MAGNESIUM: 2.5 MG/DL (ref 1.6–2.4)
MCH RBC QN AUTO: 29.8 PG (ref 26–33)
MCH RBC QN AUTO: 29.9 PG (ref 26–33)
MCH RBC QN AUTO: 30.1 PG (ref 26–33)
MCH RBC QN AUTO: 30.1 PG (ref 26–33)
MCH RBC QN AUTO: 30.2 PG (ref 26–33)
MCH RBC QN AUTO: 30.3 PG (ref 26–33)
MCH RBC QN AUTO: 30.4 PG (ref 26–33)
MCH RBC QN AUTO: 30.5 PG (ref 26–33)
MCH RBC QN AUTO: 30.5 PG (ref 26–33)
MCH RBC QN AUTO: 30.6 PG (ref 26–33)
MCH RBC QN AUTO: 30.7 PG (ref 26–33)
MCH RBC QN AUTO: 30.7 PG (ref 26–33)
MCH RBC QN AUTO: 30.9 PG (ref 26–33)
MCHC RBC AUTO-ENTMCNC: 30.2 GM/DL (ref 32.2–35.5)
MCHC RBC AUTO-ENTMCNC: 30.4 GM/DL (ref 32.2–35.5)
MCHC RBC AUTO-ENTMCNC: 30.8 GM/DL (ref 32.2–35.5)
MCHC RBC AUTO-ENTMCNC: 30.8 GM/DL (ref 32.2–35.5)
MCHC RBC AUTO-ENTMCNC: 30.9 GM/DL (ref 32.2–35.5)
MCHC RBC AUTO-ENTMCNC: 31 GM/DL (ref 32.2–35.5)
MCHC RBC AUTO-ENTMCNC: 31 GM/DL (ref 32.2–35.5)
MCHC RBC AUTO-ENTMCNC: 31.6 GM/DL (ref 32.2–35.5)
MCHC RBC AUTO-ENTMCNC: 31.7 GM/DL (ref 32.2–35.5)
MCHC RBC AUTO-ENTMCNC: 31.8 GM/DL (ref 32.2–35.5)
MCHC RBC AUTO-ENTMCNC: 32.8 GM/DL (ref 32.2–35.5)
MCHC RBC AUTO-ENTMCNC: 32.9 GM/DL (ref 32.2–35.5)
MCHC RBC AUTO-ENTMCNC: 33 GM/DL (ref 32.2–35.5)
MCHC RBC AUTO-ENTMCNC: 33.6 GM/DL (ref 32.2–35.5)
MCHC RBC AUTO-ENTMCNC: 33.8 GM/DL (ref 32.2–35.5)
MCHC RBC AUTO-ENTMCNC: 34 GM/DL (ref 32.2–35.5)
MCHC RBC AUTO-ENTMCNC: 34 GM/DL (ref 32.2–35.5)
MCHC RBC AUTO-ENTMCNC: 34.3 GM/DL (ref 32.2–35.5)
MCHC RBC AUTO-ENTMCNC: 35.2 GM/DL (ref 32.2–35.5)
MCHC RBC AUTO-ENTMCNC: 35.3 GM/DL (ref 32.2–35.5)
MCV RBC AUTO: 87.3 FL (ref 80–94)
MCV RBC AUTO: 87.6 FL (ref 80–94)
MCV RBC AUTO: 89 FL (ref 80–94)
MCV RBC AUTO: 89.2 FL (ref 80–94)
MCV RBC AUTO: 89.5 FL (ref 80–94)
MCV RBC AUTO: 90 FL (ref 80–94)
MCV RBC AUTO: 90.6 FL (ref 80–94)
MCV RBC AUTO: 91.4 FL (ref 80–94)
MCV RBC AUTO: 92.6 FL (ref 80–94)
MCV RBC AUTO: 93.6 FL (ref 80–94)
MCV RBC AUTO: 95.1 FL (ref 80–94)
MCV RBC AUTO: 95.9 FL (ref 80–94)
MCV RBC AUTO: 96.1 FL (ref 80–94)
MCV RBC AUTO: 96.4 FL (ref 80–94)
MCV RBC AUTO: 97 FL (ref 80–94)
MCV RBC AUTO: 97.9 FL (ref 80–94)
MCV RBC AUTO: 98.2 FL (ref 80–94)
MCV RBC AUTO: 98.4 FL (ref 80–94)
MCV RBC AUTO: 98.5 FL (ref 80–94)
MCV RBC AUTO: 98.9 FL (ref 80–94)
METAPNEUMOVIRUS BY PCR: NOT DETECTED
MISCELLANEOUS 2: ABNORMAL
MISCELLANEOUS LAB TEST RESULT: ABNORMAL
MODE: ABNORMAL
MONOCYTES # BLD: 1 %
MONOCYTES # BLD: 1.3 %
MONOCYTES # BLD: 1.8 %
MONOCYTES # BLD: 2 %
MONOCYTES # BLD: 2 %
MONOCYTES # BLD: 2.1 %
MONOCYTES # BLD: 2.3 %
MONOCYTES # BLD: 2.4 %
MONOCYTES # BLD: 2.5 %
MONOCYTES # BLD: 2.7 %
MONOCYTES # BLD: 2.9 %
MONOCYTES # BLD: 3.4 %
MONOCYTES # BLD: 3.5 %
MONOCYTES # BLD: 3.7 %
MONOCYTES # BLD: 3.9 %
MONOCYTES # BLD: 4 %
MONOCYTES # BLD: 4.5 %
MONOCYTES # BLD: 6.2 %
MONOCYTES # BLD: 6.6 %
MONOCYTES ABSOLUTE: 0.1 THOU/MM3 (ref 0.4–1.3)
MONOCYTES ABSOLUTE: 0.2 THOU/MM3 (ref 0.4–1.3)
MONOCYTES ABSOLUTE: 0.3 THOU/MM3 (ref 0.4–1.3)
MONOCYTES ABSOLUTE: 0.4 THOU/MM3 (ref 0.4–1.3)
MONOCYTES ABSOLUTE: 0.5 THOU/MM3 (ref 0.4–1.3)
MONOCYTES ABSOLUTE: 0.8 THOU/MM3 (ref 0.4–1.3)
MONOCYTES ABSOLUTE: 0.8 THOU/MM3 (ref 0.4–1.3)
MORAXELLA CATARRHALIS BY PCR: NOT DETECTED
MYCOPLASMA PNEUMONIAE BY PCR: NOT DETECTED
NITRITE, URINE: NEGATIVE
NITRITE, URINE: POSITIVE
NON-SARS CORONAVIRUS: NOT DETECTED
NUCLEATED RED BLOOD CELLS: 0 /100 WBC
O2 SATURATION: 80 %
O2 SATURATION: 85 %
O2 SATURATION: 91 %
O2 SATURATION: 92 %
O2 SATURATION: 95 %
O2 SATURATION: 96 %
O2 SATURATION: 99 %
ORGANISM: ABNORMAL
OSMOLALITY CALCULATION: 264.1 MOSMOL/KG (ref 275–300)
OSMOLALITY URINE: 504 MOSMOL/KG (ref 250–750)
PARAINFLUENZA VIRUS BY PCR: NOT DETECTED
PATHOLOGIST REVIEW: ABNORMAL
PCO2: 102 MMHG (ref 35–45)
PCO2: 30 MMHG (ref 35–45)
PCO2: 31 MMHG (ref 35–45)
PCO2: 33 MMHG (ref 35–45)
PCO2: 34 MMHG (ref 35–45)
PCO2: 42 MMHG (ref 35–45)
PCO2: 49 MMHG (ref 35–45)
PH BLOOD GAS: 7.12 (ref 7.35–7.45)
PH BLOOD GAS: 7.39 (ref 7.35–7.45)
PH BLOOD GAS: 7.41 (ref 7.35–7.45)
PH BLOOD GAS: 7.47 (ref 7.35–7.45)
PH BLOOD GAS: 7.47 (ref 7.35–7.45)
PH BLOOD GAS: 7.48 (ref 7.35–7.45)
PH BLOOD GAS: 7.52 (ref 7.35–7.45)
PH UA: 5 (ref 5–9)
PH UA: 5 (ref 5–9)
PIP: 12 CMH2O
PIP: 24 CMH2O
PLATELET # BLD: 140 THOU/MM3 (ref 130–400)
PLATELET # BLD: 160 THOU/MM3 (ref 130–400)
PLATELET # BLD: 167 THOU/MM3 (ref 130–400)
PLATELET # BLD: 186 THOU/MM3 (ref 130–400)
PLATELET # BLD: 196 THOU/MM3 (ref 130–400)
PLATELET # BLD: 201 THOU/MM3 (ref 130–400)
PLATELET # BLD: 205 THOU/MM3 (ref 130–400)
PLATELET # BLD: 207 THOU/MM3 (ref 130–400)
PLATELET # BLD: 212 THOU/MM3 (ref 130–400)
PLATELET # BLD: 224 THOU/MM3 (ref 130–400)
PLATELET # BLD: 234 THOU/MM3 (ref 130–400)
PLATELET # BLD: 235 THOU/MM3 (ref 130–400)
PLATELET # BLD: 246 THOU/MM3 (ref 130–400)
PLATELET # BLD: 249 THOU/MM3 (ref 130–400)
PLATELET # BLD: 250 THOU/MM3 (ref 130–400)
PLATELET # BLD: 262 THOU/MM3 (ref 130–400)
PLATELET # BLD: 280 THOU/MM3 (ref 130–400)
PLATELET # BLD: 309 THOU/MM3 (ref 130–400)
PLATELET # BLD: 369 THOU/MM3 (ref 130–400)
PLATELET # BLD: 394 THOU/MM3 (ref 130–400)
PLATELET ESTIMATE: ADEQUATE
PMV BLD AUTO: 10 FL (ref 9.4–12.4)
PMV BLD AUTO: 10.1 FL (ref 9.4–12.4)
PMV BLD AUTO: 10.2 FL (ref 9.4–12.4)
PMV BLD AUTO: 10.2 FL (ref 9.4–12.4)
PMV BLD AUTO: 10.3 FL (ref 9.4–12.4)
PMV BLD AUTO: 10.4 FL (ref 9.4–12.4)
PMV BLD AUTO: 10.4 FL (ref 9.4–12.4)
PMV BLD AUTO: 10.5 FL (ref 9.4–12.4)
PMV BLD AUTO: 10.6 FL (ref 9.4–12.4)
PMV BLD AUTO: 9.6 FL (ref 9.4–12.4)
PO2: 141 MMHG (ref 71–104)
PO2: 52 MMHG (ref 71–104)
PO2: 54 MMHG (ref 71–104)
PO2: 60 MMHG (ref 71–104)
PO2: 62 MMHG (ref 71–104)
PO2: 74 MMHG (ref 71–104)
PO2: 76 MMHG (ref 71–104)
POIKILOCYTES: ABNORMAL
POTASSIUM REFLEX MAGNESIUM: 3.7 MEQ/L (ref 3.5–5.2)
POTASSIUM REFLEX MAGNESIUM: 4.1 MEQ/L (ref 3.5–5.2)
POTASSIUM REFLEX MAGNESIUM: 4.1 MEQ/L (ref 3.5–5.2)
POTASSIUM REFLEX MAGNESIUM: 4.2 MEQ/L (ref 3.5–5.2)
POTASSIUM REFLEX MAGNESIUM: 4.2 MEQ/L (ref 3.5–5.2)
POTASSIUM REFLEX MAGNESIUM: 4.3 MEQ/L (ref 3.5–5.2)
POTASSIUM REFLEX MAGNESIUM: 4.4 MEQ/L (ref 3.5–5.2)
POTASSIUM SERPL-SCNC: 3.9 MEQ/L (ref 3.5–5.2)
POTASSIUM SERPL-SCNC: 4 MEQ/L (ref 3.5–5.2)
POTASSIUM SERPL-SCNC: 4.1 MEQ/L (ref 3.5–5.2)
POTASSIUM SERPL-SCNC: 4.2 MEQ/L (ref 3.5–5.2)
POTASSIUM SERPL-SCNC: 4.5 MEQ/L (ref 3.5–5.2)
POTASSIUM SERPL-SCNC: 4.6 MEQ/L (ref 3.5–5.2)
POTASSIUM SERPL-SCNC: 4.6 MEQ/L (ref 3.5–5.2)
POTASSIUM SERPL-SCNC: 4.7 MEQ/L (ref 3.5–5.2)
POTASSIUM SERPL-SCNC: 4.8 MEQ/L (ref 3.5–5.2)
POTASSIUM SERPL-SCNC: 4.9 MEQ/L (ref 3.5–5.2)
POTASSIUM SERPL-SCNC: 5 MEQ/L (ref 3.5–5.2)
POTASSIUM SERPL-SCNC: 5.1 MEQ/L (ref 3.5–5.2)
PRO-BNP: 144.2 PG/ML (ref 0–900)
PRO-BNP: 203.1 PG/ML (ref 0–900)
PRO-BNP: 2270 PG/ML (ref 0–900)
PROCALCITONIN: 0.35 NG/ML (ref 0.01–0.09)
PROCALCITONIN: 0.93 NG/ML (ref 0.01–0.09)
PROCALCITONIN: 0.97 NG/ML (ref 0.01–0.09)
PROCALCITONIN: 1.06 NG/ML (ref 0.01–0.09)
PROCALCITONIN: 1.59 NG/ML (ref 0.01–0.09)
PROTEIN UA: 30
PROTEIN UA: 30 MG/DL
PROTEUS SPECIES BY PCR: NOT DETECTED
PSEUDOMONAS AERUGINOSA BY PCR: NOT DETECTED
RBC # BLD: 3.05 MILL/MM3 (ref 4.7–6.1)
RBC # BLD: 3.28 MILL/MM3 (ref 4.7–6.1)
RBC # BLD: 3.34 MILL/MM3 (ref 4.7–6.1)
RBC # BLD: 3.35 MILL/MM3 (ref 4.7–6.1)
RBC # BLD: 3.36 MILL/MM3 (ref 4.7–6.1)
RBC # BLD: 3.43 MILL/MM3 (ref 4.7–6.1)
RBC # BLD: 3.52 MILL/MM3 (ref 4.7–6.1)
RBC # BLD: 3.58 MILL/MM3 (ref 4.7–6.1)
RBC # BLD: 3.58 MILL/MM3 (ref 4.7–6.1)
RBC # BLD: 3.66 MILL/MM3 (ref 4.7–6.1)
RBC # BLD: 3.7 MILL/MM3 (ref 4.7–6.1)
RBC # BLD: 3.84 MILL/MM3 (ref 4.7–6.1)
RBC # BLD: 4.29 MILL/MM3 (ref 4.7–6.1)
RBC # BLD: 4.32 MILL/MM3 (ref 4.7–6.1)
RBC # BLD: 4.48 MILL/MM3 (ref 4.7–6.1)
RBC # BLD: 4.49 MILL/MM3 (ref 4.7–6.1)
RBC # BLD: 4.59 MILL/MM3 (ref 4.7–6.1)
RBC # BLD: 4.67 MILL/MM3 (ref 4.7–6.1)
RBC # BLD: 4.82 MILL/MM3 (ref 4.7–6.1)
RBC # BLD: 4.83 MILL/MM3 (ref 4.7–6.1)
RBC URINE: > 200 /HPF
RBC URINE: ABNORMAL /HPF
RENAL EPITHELIAL, UA: ABNORMAL
RENAL EPITHELIAL, UA: ABNORMAL
RESISTANT GENE CTX-M BY PCR: NOT DETECTED
RESISTANT GENE IMP BY PCR: NOT DETECTED
RESISTANT GENE KPC BY PCR: NOT DETECTED
RESISTANT GENE MECA/C & MREJ BY PCR: ABNORMAL
RESISTANT GENE NDM BY PCR: NOT DETECTED
RESISTANT GENE OXA-48-LIKE BY PCR: NOT DETECTED
RESISTANT GENE VIM BY PCR: NOT DETECTED
RESPIRATORY CULTURE: ABNORMAL
RESPIRATORY CULTURE: ABNORMAL
RESPIRATORY SYNCYTIAL VIRUS BY PCR: NOT DETECTED
RHINOVIRUS ENTEROVIRUS PCR: NOT DETECTED
SARS-COV-2: DETECTED
SCAN OF BLOOD SMEAR: NORMAL
SCAN OF BLOOD SMEAR: NORMAL
SEG NEUTROPHILS: 86.4 %
SEG NEUTROPHILS: 86.8 %
SEG NEUTROPHILS: 87.4 %
SEG NEUTROPHILS: 88.1 %
SEG NEUTROPHILS: 90.4 %
SEG NEUTROPHILS: 90.6 %
SEG NEUTROPHILS: 91 %
SEG NEUTROPHILS: 91.6 %
SEG NEUTROPHILS: 92.4 %
SEG NEUTROPHILS: 92.4 %
SEG NEUTROPHILS: 92.5 %
SEG NEUTROPHILS: 92.8 %
SEG NEUTROPHILS: 92.8 %
SEG NEUTROPHILS: 93 %
SEG NEUTROPHILS: 93.3 %
SEG NEUTROPHILS: 93.9 %
SEG NEUTROPHILS: 93.9 %
SEG NEUTROPHILS: 95 %
SEG NEUTROPHILS: 95.3 %
SEGMENTED NEUTROPHILS ABSOLUTE COUNT: 10.1 THOU/MM3 (ref 1.8–7.7)
SEGMENTED NEUTROPHILS ABSOLUTE COUNT: 10.4 THOU/MM3 (ref 1.8–7.7)
SEGMENTED NEUTROPHILS ABSOLUTE COUNT: 11.1 THOU/MM3 (ref 1.8–7.7)
SEGMENTED NEUTROPHILS ABSOLUTE COUNT: 11.1 THOU/MM3 (ref 1.8–7.7)
SEGMENTED NEUTROPHILS ABSOLUTE COUNT: 11.3 THOU/MM3 (ref 1.8–7.7)
SEGMENTED NEUTROPHILS ABSOLUTE COUNT: 11.6 THOU/MM3 (ref 1.8–7.7)
SEGMENTED NEUTROPHILS ABSOLUTE COUNT: 12.6 THOU/MM3 (ref 1.8–7.7)
SEGMENTED NEUTROPHILS ABSOLUTE COUNT: 13 THOU/MM3 (ref 1.8–7.7)
SEGMENTED NEUTROPHILS ABSOLUTE COUNT: 13 THOU/MM3 (ref 1.8–7.7)
SEGMENTED NEUTROPHILS ABSOLUTE COUNT: 19.9 THOU/MM3 (ref 1.8–7.7)
SEGMENTED NEUTROPHILS ABSOLUTE COUNT: 20.3 THOU/MM3 (ref 1.8–7.7)
SEGMENTED NEUTROPHILS ABSOLUTE COUNT: 20.8 THOU/MM3 (ref 1.8–7.7)
SEGMENTED NEUTROPHILS ABSOLUTE COUNT: 7.1 THOU/MM3 (ref 1.8–7.7)
SEGMENTED NEUTROPHILS ABSOLUTE COUNT: 7.6 THOU/MM3 (ref 1.8–7.7)
SEGMENTED NEUTROPHILS ABSOLUTE COUNT: 7.7 THOU/MM3 (ref 1.8–7.7)
SEGMENTED NEUTROPHILS ABSOLUTE COUNT: 9 THOU/MM3 (ref 1.8–7.7)
SEGMENTED NEUTROPHILS ABSOLUTE COUNT: 9.1 THOU/MM3 (ref 1.8–7.7)
SEGMENTED NEUTROPHILS ABSOLUTE COUNT: 9.3 THOU/MM3 (ref 1.8–7.7)
SEGMENTED NEUTROPHILS ABSOLUTE COUNT: 9.3 THOU/MM3 (ref 1.8–7.7)
SERRATIA MARCESCENS BY PCR: NOT DETECTED
SET PEEP: 14 MMHG
SET PEEP: 20 MMHG
SET PEEP: 6 MMHG
SET RESPIRATORY RATE: 18 BPM
SET RESPIRATORY RATE: 20 BPM
SODIUM BLD-SCNC: 129 MEQ/L (ref 135–145)
SODIUM BLD-SCNC: 130 MEQ/L (ref 135–145)
SODIUM BLD-SCNC: 130 MEQ/L (ref 135–145)
SODIUM BLD-SCNC: 131 MEQ/L (ref 135–145)
SODIUM BLD-SCNC: 132 MEQ/L (ref 135–145)
SODIUM BLD-SCNC: 133 MEQ/L (ref 135–145)
SODIUM BLD-SCNC: 134 MEQ/L (ref 135–145)
SODIUM BLD-SCNC: 134 MEQ/L (ref 135–145)
SODIUM BLD-SCNC: 135 MEQ/L (ref 135–145)
SODIUM BLD-SCNC: 136 MEQ/L (ref 135–145)
SODIUM BLD-SCNC: 136 MEQ/L (ref 135–145)
SODIUM BLD-SCNC: 139 MEQ/L (ref 135–145)
SODIUM BLD-SCNC: 140 MEQ/L (ref 135–145)
SODIUM BLD-SCNC: 141 MEQ/L (ref 135–145)
SODIUM BLD-SCNC: 142 MEQ/L (ref 135–145)
SODIUM BLD-SCNC: 144 MEQ/L (ref 135–145)
SODIUM BLD-SCNC: 145 MEQ/L (ref 135–145)
SODIUM URINE: 21 MEQ/L
SODIUM URINE: 78 MEQ/L
SOURCE, BLOOD GAS: ABNORMAL
SOURCE: ABNORMAL
SPECIFIC GRAVITY UA: 1.01 (ref 1–1.03)
SPECIFIC GRAVITY, URINE: 1.02 (ref 1–1.03)
SPECIMEN ACCEPTABILITY: ABNORMAL
STAPH AUREUS BY PCR: NOT DETECTED
STREP AGALACTIAE BY PCR: NOT DETECTED
STREP PNEUMONIAE BY PCR: NOT DETECTED
STREP PYOGENES BY PCR: NOT DETECTED
T4 FREE: 1.31 NG/DL (ref 0.93–1.76)
TOTAL PROTEIN: 5.3 G/DL (ref 6.1–8)
TOTAL PROTEIN: 5.5 G/DL (ref 6.1–8)
TOTAL PROTEIN: 5.5 G/DL (ref 6.1–8)
TOTAL PROTEIN: 5.7 G/DL (ref 6.1–8)
TOTAL PROTEIN: 5.7 G/DL (ref 6.1–8)
TOTAL PROTEIN: 5.8 G/DL (ref 6.1–8)
TOTAL PROTEIN: 5.8 G/DL (ref 6.1–8)
TOTAL PROTEIN: 6.1 G/DL (ref 6.1–8)
TOTAL PROTEIN: 6.3 G/DL (ref 6.1–8)
TOTAL PROTEIN: 6.9 G/DL (ref 6.1–8)
TOTAL PROTEIN: 7.7 G/DL (ref 6.1–8)
TOXIC GRANULATION: PRESENT
TRIGL SERPL-MCNC: 213 MG/DL (ref 0–199)
TRIGL SERPL-MCNC: 333 MG/DL (ref 0–199)
TROPONIN T: < 0.01 NG/ML
TROPONIN T: < 0.01 NG/ML
TSH SERPL DL<=0.05 MIU/L-ACNC: 0.82 UIU/ML (ref 0.4–4.2)
URINE CULTURE REFLEX: NORMAL
URINE CULTURE, ROUTINE: NORMAL
UROBILINOGEN, URINE: 1 EU/DL (ref 0–1)
UROBILINOGEN, URINE: 2 EU/DL (ref 0–1)
WBC # BLD: 10.2 THOU/MM3 (ref 4.8–10.8)
WBC # BLD: 10.3 THOU/MM3 (ref 4.8–10.8)
WBC # BLD: 10.8 THOU/MM3 (ref 4.8–10.8)
WBC # BLD: 11.2 THOU/MM3 (ref 4.8–10.8)
WBC # BLD: 12.1 THOU/MM3 (ref 4.8–10.8)
WBC # BLD: 12.3 THOU/MM3 (ref 4.8–10.8)
WBC # BLD: 12.4 THOU/MM3 (ref 4.8–10.8)
WBC # BLD: 12.8 THOU/MM3 (ref 4.8–10.8)
WBC # BLD: 13.3 THOU/MM3 (ref 4.8–10.8)
WBC # BLD: 14 THOU/MM3 (ref 4.8–10.8)
WBC # BLD: 14.4 THOU/MM3 (ref 4.8–10.8)
WBC # BLD: 15.6 THOU/MM3 (ref 4.8–10.8)
WBC # BLD: 21.3 THOU/MM3 (ref 4.8–10.8)
WBC # BLD: 21.4 THOU/MM3 (ref 4.8–10.8)
WBC # BLD: 22.9 THOU/MM3 (ref 4.8–10.8)
WBC # BLD: 8.2 THOU/MM3 (ref 4.8–10.8)
WBC # BLD: 8.2 THOU/MM3 (ref 4.8–10.8)
WBC # BLD: 8.8 THOU/MM3 (ref 4.8–10.8)
WBC # BLD: 9.7 THOU/MM3 (ref 4.8–10.8)
WBC # BLD: 9.9 THOU/MM3 (ref 4.8–10.8)
WBC UA: ABNORMAL /HPF
WBC UA: ABNORMAL /HPF
YEAST: ABNORMAL
YEAST: ABNORMAL

## 2021-01-01 PROCEDURE — 6360000002 HC RX W HCPCS: Performed by: HOSPITALIST

## 2021-01-01 PROCEDURE — 87631 RESP VIRUS 3-5 TARGETS: CPT

## 2021-01-01 PROCEDURE — 80053 COMPREHEN METABOLIC PANEL: CPT

## 2021-01-01 PROCEDURE — 6360000002 HC RX W HCPCS: Performed by: NURSE PRACTITIONER

## 2021-01-01 PROCEDURE — 2580000003 HC RX 258: Performed by: STUDENT IN AN ORGANIZED HEALTH CARE EDUCATION/TRAINING PROGRAM

## 2021-01-01 PROCEDURE — 2500000003 HC RX 250 WO HCPCS: Performed by: STUDENT IN AN ORGANIZED HEALTH CARE EDUCATION/TRAINING PROGRAM

## 2021-01-01 PROCEDURE — 6370000000 HC RX 637 (ALT 250 FOR IP): Performed by: STUDENT IN AN ORGANIZED HEALTH CARE EDUCATION/TRAINING PROGRAM

## 2021-01-01 PROCEDURE — 82948 REAGENT STRIP/BLOOD GLUCOSE: CPT

## 2021-01-01 PROCEDURE — 94660 CPAP INITIATION&MGMT: CPT

## 2021-01-01 PROCEDURE — 94003 VENT MGMT INPAT SUBQ DAY: CPT

## 2021-01-01 PROCEDURE — 82803 BLOOD GASES ANY COMBINATION: CPT

## 2021-01-01 PROCEDURE — 6360000002 HC RX W HCPCS: Performed by: INTERNAL MEDICINE

## 2021-01-01 PROCEDURE — 6370000000 HC RX 637 (ALT 250 FOR IP): Performed by: INTERNAL MEDICINE

## 2021-01-01 PROCEDURE — 2060000000 HC ICU INTERMEDIATE R&B

## 2021-01-01 PROCEDURE — 36556 INSERT NON-TUNNEL CV CATH: CPT

## 2021-01-01 PROCEDURE — 85379 FIBRIN DEGRADATION QUANT: CPT

## 2021-01-01 PROCEDURE — 6360000002 HC RX W HCPCS: Performed by: STUDENT IN AN ORGANIZED HEALTH CARE EDUCATION/TRAINING PROGRAM

## 2021-01-01 PROCEDURE — 85025 COMPLETE CBC W/AUTO DIFF WBC: CPT

## 2021-01-01 PROCEDURE — 99233 SBSQ HOSP IP/OBS HIGH 50: CPT | Performed by: INTERNAL MEDICINE

## 2021-01-01 PROCEDURE — 93005 ELECTROCARDIOGRAM TRACING: CPT | Performed by: INTERNAL MEDICINE

## 2021-01-01 PROCEDURE — 87070 CULTURE OTHR SPECIMN AEROBIC: CPT

## 2021-01-01 PROCEDURE — 6370000000 HC RX 637 (ALT 250 FOR IP): Performed by: SURGERY

## 2021-01-01 PROCEDURE — 2500000003 HC RX 250 WO HCPCS: Performed by: SURGERY

## 2021-01-01 PROCEDURE — 1200000003 HC TELEMETRY R&B

## 2021-01-01 PROCEDURE — 99291 CRITICAL CARE FIRST HOUR: CPT | Performed by: INTERNAL MEDICINE

## 2021-01-01 PROCEDURE — 0BH18EZ INSERTION OF ENDOTRACHEAL AIRWAY INTO TRACHEA, VIA NATURAL OR ARTIFICIAL OPENING ENDOSCOPIC: ICD-10-PCS | Performed by: SURGERY

## 2021-01-01 PROCEDURE — 81001 URINALYSIS AUTO W/SCOPE: CPT

## 2021-01-01 PROCEDURE — 8E0ZXY6 ISOLATION: ICD-10-PCS | Performed by: INTERNAL MEDICINE

## 2021-01-01 PROCEDURE — 2500000003 HC RX 250 WO HCPCS

## 2021-01-01 PROCEDURE — 2580000003 HC RX 258

## 2021-01-01 PROCEDURE — 80048 BASIC METABOLIC PNL TOTAL CA: CPT

## 2021-01-01 PROCEDURE — 36415 COLL VENOUS BLD VENIPUNCTURE: CPT

## 2021-01-01 PROCEDURE — 84443 ASSAY THYROID STIM HORMONE: CPT

## 2021-01-01 PROCEDURE — 6360000002 HC RX W HCPCS

## 2021-01-01 PROCEDURE — 02HV33Z INSERTION OF INFUSION DEVICE INTO SUPERIOR VENA CAVA, PERCUTANEOUS APPROACH: ICD-10-PCS | Performed by: FAMILY MEDICINE

## 2021-01-01 PROCEDURE — 85027 COMPLETE CBC AUTOMATED: CPT

## 2021-01-01 PROCEDURE — 2580000003 HC RX 258: Performed by: SURGERY

## 2021-01-01 PROCEDURE — 6360000002 HC RX W HCPCS: Performed by: PHYSICIAN ASSISTANT

## 2021-01-01 PROCEDURE — P9047 ALBUMIN (HUMAN), 25%, 50ML: HCPCS | Performed by: STUDENT IN AN ORGANIZED HEALTH CARE EDUCATION/TRAINING PROGRAM

## 2021-01-01 PROCEDURE — 93010 ELECTROCARDIOGRAM REPORT: CPT | Performed by: NUCLEAR MEDICINE

## 2021-01-01 PROCEDURE — 31500 INSERT EMERGENCY AIRWAY: CPT | Performed by: NURSE PRACTITIONER

## 2021-01-01 PROCEDURE — 0B21XEZ CHANGE ENDOTRACHEAL AIRWAY IN TRACHEA, EXTERNAL APPROACH: ICD-10-PCS | Performed by: INTERNAL MEDICINE

## 2021-01-01 PROCEDURE — 83880 ASSAY OF NATRIURETIC PEPTIDE: CPT

## 2021-01-01 PROCEDURE — 2580000003 HC RX 258: Performed by: INTERNAL MEDICINE

## 2021-01-01 PROCEDURE — 2100000000 HC CCU R&B

## 2021-01-01 PROCEDURE — 36592 COLLECT BLOOD FROM PICC: CPT

## 2021-01-01 PROCEDURE — 94002 VENT MGMT INPAT INIT DAY: CPT

## 2021-01-01 PROCEDURE — 76705 ECHO EXAM OF ABDOMEN: CPT

## 2021-01-01 PROCEDURE — 87798 DETECT AGENT NOS DNA AMP: CPT

## 2021-01-01 PROCEDURE — 2580000003 HC RX 258: Performed by: NURSE PRACTITIONER

## 2021-01-01 PROCEDURE — 85520 HEPARIN ASSAY: CPT

## 2021-01-01 PROCEDURE — 84484 ASSAY OF TROPONIN QUANT: CPT

## 2021-01-01 PROCEDURE — 71045 X-RAY EXAM CHEST 1 VIEW: CPT

## 2021-01-01 PROCEDURE — 83735 ASSAY OF MAGNESIUM: CPT

## 2021-01-01 PROCEDURE — 6370000000 HC RX 637 (ALT 250 FOR IP): Performed by: HOSPITALIST

## 2021-01-01 PROCEDURE — 93010 ELECTROCARDIOGRAM REPORT: CPT | Performed by: INTERNAL MEDICINE

## 2021-01-01 PROCEDURE — 84145 PROCALCITONIN (PCT): CPT

## 2021-01-01 PROCEDURE — 86140 C-REACTIVE PROTEIN: CPT

## 2021-01-01 PROCEDURE — 87086 URINE CULTURE/COLONY COUNT: CPT

## 2021-01-01 PROCEDURE — 82570 ASSAY OF URINE CREATININE: CPT

## 2021-01-01 PROCEDURE — 82728 ASSAY OF FERRITIN: CPT

## 2021-01-01 PROCEDURE — 83935 ASSAY OF URINE OSMOLALITY: CPT

## 2021-01-01 PROCEDURE — 85610 PROTHROMBIN TIME: CPT

## 2021-01-01 PROCEDURE — 99223 1ST HOSP IP/OBS HIGH 75: CPT | Performed by: INTERNAL MEDICINE

## 2021-01-01 PROCEDURE — 83605 ASSAY OF LACTIC ACID: CPT

## 2021-01-01 PROCEDURE — 36600 WITHDRAWAL OF ARTERIAL BLOOD: CPT

## 2021-01-01 PROCEDURE — 97110 THERAPEUTIC EXERCISES: CPT

## 2021-01-01 PROCEDURE — 6370000000 HC RX 637 (ALT 250 FOR IP): Performed by: NURSE PRACTITIONER

## 2021-01-01 PROCEDURE — 84300 ASSAY OF URINE SODIUM: CPT

## 2021-01-01 PROCEDURE — 82330 ASSAY OF CALCIUM: CPT

## 2021-01-01 PROCEDURE — 87205 SMEAR GRAM STAIN: CPT

## 2021-01-01 PROCEDURE — 87541 LEGION PNEUMO DNA AMP PROB: CPT

## 2021-01-01 PROCEDURE — 83615 LACTATE (LD) (LDH) ENZYME: CPT

## 2021-01-01 PROCEDURE — 2500000003 HC RX 250 WO HCPCS: Performed by: NURSE PRACTITIONER

## 2021-01-01 PROCEDURE — 71275 CT ANGIOGRAPHY CHEST: CPT

## 2021-01-01 PROCEDURE — XW0DXM6 INTRODUCTION OF BARICITINIB INTO MOUTH AND PHARYNX, EXTERNAL APPROACH, NEW TECHNOLOGY GROUP 6: ICD-10-PCS | Performed by: INTERNAL MEDICINE

## 2021-01-01 PROCEDURE — 97535 SELF CARE MNGMENT TRAINING: CPT

## 2021-01-01 PROCEDURE — 84439 ASSAY OF FREE THYROXINE: CPT

## 2021-01-01 PROCEDURE — 5A2204Z RESTORATION OF CARDIAC RHYTHM, SINGLE: ICD-10-PCS | Performed by: INTERNAL MEDICINE

## 2021-01-01 PROCEDURE — 87077 CULTURE AEROBIC IDENTIFY: CPT

## 2021-01-01 PROCEDURE — 82248 BILIRUBIN DIRECT: CPT

## 2021-01-01 PROCEDURE — 76770 US EXAM ABDO BACK WALL COMP: CPT

## 2021-01-01 PROCEDURE — 97166 OT EVAL MOD COMPLEX 45 MIN: CPT

## 2021-01-01 PROCEDURE — 87186 SC STD MICRODIL/AGAR DIL: CPT

## 2021-01-01 PROCEDURE — 36620 INSERTION CATHETER ARTERY: CPT | Performed by: NURSE PRACTITIONER

## 2021-01-01 PROCEDURE — 6360000002 HC RX W HCPCS: Performed by: SURGERY

## 2021-01-01 PROCEDURE — 97530 THERAPEUTIC ACTIVITIES: CPT

## 2021-01-01 PROCEDURE — 87150 DNA/RNA AMPLIFIED PROBE: CPT

## 2021-01-01 PROCEDURE — 87040 BLOOD CULTURE FOR BACTERIA: CPT

## 2021-01-01 PROCEDURE — 85730 THROMBOPLASTIN TIME PARTIAL: CPT

## 2021-01-01 PROCEDURE — 99284 EMERGENCY DEPT VISIT MOD MDM: CPT

## 2021-01-01 PROCEDURE — 74018 RADEX ABDOMEN 1 VIEW: CPT

## 2021-01-01 PROCEDURE — 99223 1ST HOSP IP/OBS HIGH 75: CPT | Performed by: SURGERY

## 2021-01-01 PROCEDURE — 97162 PT EVAL MOD COMPLEX 30 MIN: CPT

## 2021-01-01 PROCEDURE — 31500 INSERT EMERGENCY AIRWAY: CPT | Performed by: SURGERY

## 2021-01-01 PROCEDURE — 80076 HEPATIC FUNCTION PANEL: CPT

## 2021-01-01 PROCEDURE — 2700000000 HC OXYGEN THERAPY PER DAY

## 2021-01-01 PROCEDURE — 93306 TTE W/DOPPLER COMPLETE: CPT

## 2021-01-01 PROCEDURE — 93005 ELECTROCARDIOGRAM TRACING: CPT | Performed by: PHYSICIAN ASSISTANT

## 2021-01-01 PROCEDURE — 99239 HOSP IP/OBS DSCHRG MGMT >30: CPT | Performed by: NURSE PRACTITIONER

## 2021-01-01 PROCEDURE — 87486 CHLMYD PNEUM DNA AMP PROBE: CPT

## 2021-01-01 PROCEDURE — 6360000004 HC RX CONTRAST MEDICATION: Performed by: PHYSICIAN ASSISTANT

## 2021-01-01 PROCEDURE — 94761 N-INVAS EAR/PLS OXIMETRY MLT: CPT

## 2021-01-01 PROCEDURE — 99222 1ST HOSP IP/OBS MODERATE 55: CPT | Performed by: INTERNAL MEDICINE

## 2021-01-01 PROCEDURE — 6370000000 HC RX 637 (ALT 250 FOR IP): Performed by: PHYSICIAN ASSISTANT

## 2021-01-01 PROCEDURE — 5A1955Z RESPIRATORY VENTILATION, GREATER THAN 96 CONSECUTIVE HOURS: ICD-10-PCS | Performed by: SURGERY

## 2021-01-01 PROCEDURE — 87581 M.PNEUMON DNA AMP PROBE: CPT

## 2021-01-01 PROCEDURE — 36556 INSERT NON-TUNNEL CV CATH: CPT | Performed by: FAMILY MEDICINE

## 2021-01-01 PROCEDURE — 6360000004 HC RX CONTRAST MEDICATION: Performed by: INTERNAL MEDICINE

## 2021-01-01 PROCEDURE — 2580000003 HC RX 258: Performed by: PHYSICIAN ASSISTANT

## 2021-01-01 PROCEDURE — 94760 N-INVAS EAR/PLS OXIMETRY 1: CPT

## 2021-01-01 PROCEDURE — 37799 UNLISTED PX VASCULAR SURGERY: CPT

## 2021-01-01 PROCEDURE — 84478 ASSAY OF TRIGLYCERIDES: CPT

## 2021-01-01 PROCEDURE — 93005 ELECTROCARDIOGRAM TRACING: CPT | Performed by: STUDENT IN AN ORGANIZED HEALTH CARE EDUCATION/TRAINING PROGRAM

## 2021-01-01 PROCEDURE — 82140 ASSAY OF AMMONIA: CPT

## 2021-01-01 RX ORDER — FUROSEMIDE 10 MG/ML
20 INJECTION INTRAMUSCULAR; INTRAVENOUS 2 TIMES DAILY
Status: DISCONTINUED | OUTPATIENT
Start: 2021-01-01 | End: 2021-01-01

## 2021-01-01 RX ORDER — METOPROLOL TARTRATE 50 MG/1
25 TABLET, FILM COATED ORAL ONCE
Status: COMPLETED | OUTPATIENT
Start: 2021-01-01 | End: 2021-01-01

## 2021-01-01 RX ORDER — ACETAMINOPHEN 325 MG/1
650 TABLET ORAL EVERY 6 HOURS PRN
Status: DISCONTINUED | OUTPATIENT
Start: 2021-01-01 | End: 2021-01-01 | Stop reason: HOSPADM

## 2021-01-01 RX ORDER — NOREPINEPHRINE BIT/0.9 % NACL 16MG/250ML
INFUSION BOTTLE (ML) INTRAVENOUS
Status: DISPENSED
Start: 2021-01-01 | End: 2021-01-01

## 2021-01-01 RX ORDER — NICOTINE POLACRILEX 4 MG
15 LOZENGE BUCCAL PRN
Status: DISCONTINUED | OUTPATIENT
Start: 2021-01-01 | End: 2021-01-01 | Stop reason: HOSPADM

## 2021-01-01 RX ORDER — ZINC SULFATE 50(220)MG
50 CAPSULE ORAL DAILY
Status: DISCONTINUED | OUTPATIENT
Start: 2021-01-01 | End: 2021-01-01 | Stop reason: HOSPADM

## 2021-01-01 RX ORDER — KETAMINE HCL IN NACL, ISO-OSM 100MG/10ML
SYRINGE (ML) INJECTION
Status: COMPLETED
Start: 2021-01-01 | End: 2021-01-01

## 2021-01-01 RX ORDER — LORAZEPAM 2 MG/ML
2 INJECTION INTRAMUSCULAR ONCE
Status: COMPLETED | OUTPATIENT
Start: 2021-01-01 | End: 2021-01-01

## 2021-01-01 RX ORDER — ALBUMIN (HUMAN) 12.5 G/50ML
25 SOLUTION INTRAVENOUS ONCE
Status: COMPLETED | OUTPATIENT
Start: 2021-01-01 | End: 2021-01-01

## 2021-01-01 RX ORDER — LANOLIN ALCOHOL/MO/W.PET/CERES
5 CREAM (GRAM) TOPICAL NIGHTLY PRN
Status: DISCONTINUED | OUTPATIENT
Start: 2021-01-01 | End: 2021-01-01 | Stop reason: HOSPADM

## 2021-01-01 RX ORDER — BISACODYL 10 MG
10 SUPPOSITORY, RECTAL RECTAL DAILY
Status: DISCONTINUED | OUTPATIENT
Start: 2021-01-01 | End: 2021-01-01 | Stop reason: HOSPADM

## 2021-01-01 RX ORDER — POLYETHYLENE GLYCOL 3350 17 G/17G
17 POWDER, FOR SOLUTION ORAL DAILY
Status: DISCONTINUED | OUTPATIENT
Start: 2021-01-01 | End: 2021-01-01 | Stop reason: HOSPADM

## 2021-01-01 RX ORDER — DEXAMETHASONE SODIUM PHOSPHATE 4 MG/ML
10 INJECTION, SOLUTION INTRA-ARTICULAR; INTRALESIONAL; INTRAMUSCULAR; INTRAVENOUS; SOFT TISSUE EVERY 24 HOURS
Status: DISCONTINUED | OUTPATIENT
Start: 2021-01-01 | End: 2021-01-01

## 2021-01-01 RX ORDER — DEXAMETHASONE SODIUM PHOSPHATE 4 MG/ML
10 INJECTION, SOLUTION INTRA-ARTICULAR; INTRALESIONAL; INTRAMUSCULAR; INTRAVENOUS; SOFT TISSUE DAILY
Status: DISCONTINUED | OUTPATIENT
Start: 2021-01-01 | End: 2021-01-01

## 2021-01-01 RX ORDER — HEPARIN SODIUM 1000 [USP'U]/ML
80 INJECTION, SOLUTION INTRAVENOUS; SUBCUTANEOUS PRN
Status: DISCONTINUED | OUTPATIENT
Start: 2021-01-01 | End: 2021-01-01 | Stop reason: ALTCHOICE

## 2021-01-01 RX ORDER — DEXMEDETOMIDINE HYDROCHLORIDE 4 UG/ML
.2-1.4 INJECTION, SOLUTION INTRAVENOUS CONTINUOUS
Status: DISCONTINUED | OUTPATIENT
Start: 2021-01-01 | End: 2021-01-01

## 2021-01-01 RX ORDER — NOREPINEPHRINE BIT/0.9 % NACL 16MG/250ML
2-100 INFUSION BOTTLE (ML) INTRAVENOUS CONTINUOUS
Status: DISCONTINUED | OUTPATIENT
Start: 2021-01-01 | End: 2021-01-01

## 2021-01-01 RX ORDER — BUMETANIDE 0.25 MG/ML
2 INJECTION, SOLUTION INTRAMUSCULAR; INTRAVENOUS ONCE
Status: COMPLETED | OUTPATIENT
Start: 2021-01-01 | End: 2021-01-01

## 2021-01-01 RX ORDER — FUROSEMIDE 10 MG/ML
40 INJECTION INTRAMUSCULAR; INTRAVENOUS ONCE
Status: COMPLETED | OUTPATIENT
Start: 2021-01-01 | End: 2021-01-01

## 2021-01-01 RX ORDER — ONDANSETRON 2 MG/ML
4 INJECTION INTRAMUSCULAR; INTRAVENOUS EVERY 6 HOURS PRN
Status: DISCONTINUED | OUTPATIENT
Start: 2021-01-01 | End: 2021-01-01 | Stop reason: HOSPADM

## 2021-01-01 RX ORDER — HEPARIN SODIUM 1000 [USP'U]/ML
80 INJECTION, SOLUTION INTRAVENOUS; SUBCUTANEOUS ONCE
Status: COMPLETED | OUTPATIENT
Start: 2021-01-01 | End: 2021-01-01

## 2021-01-01 RX ORDER — FUROSEMIDE 10 MG/ML
20 INJECTION INTRAMUSCULAR; INTRAVENOUS DAILY
Status: DISCONTINUED | OUTPATIENT
Start: 2021-01-01 | End: 2021-01-01

## 2021-01-01 RX ORDER — LISINOPRIL 10 MG/1
10 TABLET ORAL DAILY
Status: DISCONTINUED | OUTPATIENT
Start: 2021-01-01 | End: 2021-01-01

## 2021-01-01 RX ORDER — ONDANSETRON 2 MG/ML
4 INJECTION INTRAMUSCULAR; INTRAVENOUS EVERY 6 HOURS PRN
Status: DISCONTINUED | OUTPATIENT
Start: 2021-01-01 | End: 2021-01-01 | Stop reason: SDUPTHER

## 2021-01-01 RX ORDER — SODIUM CHLORIDE 9 MG/ML
INJECTION, SOLUTION INTRAVENOUS CONTINUOUS
Status: DISCONTINUED | OUTPATIENT
Start: 2021-01-01 | End: 2021-01-01

## 2021-01-01 RX ORDER — LORAZEPAM 2 MG/ML
1 INJECTION INTRAMUSCULAR EVERY 6 HOURS PRN
Status: DISCONTINUED | OUTPATIENT
Start: 2021-01-01 | End: 2021-01-01 | Stop reason: HOSPADM

## 2021-01-01 RX ORDER — DIPHENHYDRAMINE HCL 25 MG
25 TABLET ORAL NIGHTLY PRN
Status: DISCONTINUED | OUTPATIENT
Start: 2021-01-01 | End: 2021-01-01 | Stop reason: HOSPADM

## 2021-01-01 RX ORDER — SODIUM CHLORIDE 9 MG/ML
25 INJECTION, SOLUTION INTRAVENOUS PRN
Status: DISCONTINUED | OUTPATIENT
Start: 2021-01-01 | End: 2021-01-01 | Stop reason: HOSPADM

## 2021-01-01 RX ORDER — HEPARIN SODIUM 10000 [USP'U]/100ML
5-30 INJECTION, SOLUTION INTRAVENOUS CONTINUOUS
Status: DISCONTINUED | OUTPATIENT
Start: 2021-01-01 | End: 2021-01-01

## 2021-01-01 RX ORDER — ETOMIDATE 2 MG/ML
20 INJECTION INTRAVENOUS ONCE
Status: COMPLETED | OUTPATIENT
Start: 2021-01-01 | End: 2021-01-01

## 2021-01-01 RX ORDER — LANOLIN ALCOHOL/MO/W.PET/CERES
3 CREAM (GRAM) TOPICAL NIGHTLY PRN
Status: DISCONTINUED | OUTPATIENT
Start: 2021-01-01 | End: 2021-01-01

## 2021-01-01 RX ORDER — GENTAMICIN SULFATE 60 MG/50ML
120 INJECTION, SOLUTION INTRAVENOUS EVERY 8 HOURS
Status: DISCONTINUED | OUTPATIENT
Start: 2021-01-01 | End: 2021-01-01

## 2021-01-01 RX ORDER — ACETAMINOPHEN 500 MG
500 TABLET ORAL EVERY 4 HOURS PRN
Status: DISCONTINUED | OUTPATIENT
Start: 2021-01-01 | End: 2021-01-01 | Stop reason: SDUPTHER

## 2021-01-01 RX ORDER — FUROSEMIDE 10 MG/ML
20 INJECTION INTRAMUSCULAR; INTRAVENOUS ONCE
Status: COMPLETED | OUTPATIENT
Start: 2021-01-01 | End: 2021-01-01

## 2021-01-01 RX ORDER — HEPARIN SODIUM 1000 [USP'U]/ML
40 INJECTION, SOLUTION INTRAVENOUS; SUBCUTANEOUS PRN
Status: DISCONTINUED | OUTPATIENT
Start: 2021-01-01 | End: 2021-01-01 | Stop reason: ALTCHOICE

## 2021-01-01 RX ORDER — ASCORBIC ACID 500 MG
1000 TABLET ORAL DAILY
Status: DISCONTINUED | OUTPATIENT
Start: 2021-01-01 | End: 2021-01-01

## 2021-01-01 RX ORDER — SENNOSIDES 8.8 MG/5ML
5 LIQUID ORAL 2 TIMES DAILY
Status: DISCONTINUED | OUTPATIENT
Start: 2021-01-01 | End: 2021-01-01 | Stop reason: HOSPADM

## 2021-01-01 RX ORDER — 0.9 % SODIUM CHLORIDE 0.9 %
500 INTRAVENOUS SOLUTION INTRAVENOUS ONCE
Status: COMPLETED | OUTPATIENT
Start: 2021-01-01 | End: 2021-01-01

## 2021-01-01 RX ORDER — PROPOFOL 10 MG/ML
5-50 INJECTION, EMULSION INTRAVENOUS
Status: DISCONTINUED | OUTPATIENT
Start: 2021-01-01 | End: 2021-01-01

## 2021-01-01 RX ORDER — LORAZEPAM 2 MG/ML
INJECTION INTRAMUSCULAR
Status: COMPLETED
Start: 2021-01-01 | End: 2021-01-01

## 2021-01-01 RX ORDER — ACETAMINOPHEN 650 MG/1
650 SUPPOSITORY RECTAL EVERY 6 HOURS PRN
Status: DISCONTINUED | OUTPATIENT
Start: 2021-01-01 | End: 2021-01-01 | Stop reason: HOSPADM

## 2021-01-01 RX ORDER — DEXAMETHASONE 4 MG/1
6 TABLET ORAL ONCE
Status: COMPLETED | OUTPATIENT
Start: 2021-01-01 | End: 2021-01-01

## 2021-01-01 RX ORDER — DOPAMINE HYDROCHLORIDE 160 MG/100ML
INJECTION, SOLUTION INTRAVENOUS
Status: COMPLETED
Start: 2021-01-01 | End: 2021-01-01

## 2021-01-01 RX ORDER — METOPROLOL TARTRATE 50 MG/1
50 TABLET, FILM COATED ORAL 2 TIMES DAILY
Status: DISCONTINUED | OUTPATIENT
Start: 2021-01-01 | End: 2021-01-01 | Stop reason: HOSPADM

## 2021-01-01 RX ORDER — FAMOTIDINE 20 MG/1
20 TABLET, FILM COATED ORAL 2 TIMES DAILY
Status: DISCONTINUED | OUTPATIENT
Start: 2021-01-01 | End: 2021-01-01

## 2021-01-01 RX ORDER — GUAIFENESIN/DEXTROMETHORPHAN 100-10MG/5
10 SYRUP ORAL EVERY 4 HOURS PRN
Status: DISCONTINUED | OUTPATIENT
Start: 2021-01-01 | End: 2021-01-01 | Stop reason: HOSPADM

## 2021-01-01 RX ORDER — BENZONATATE 100 MG/1
100 CAPSULE ORAL 3 TIMES DAILY PRN
Status: DISCONTINUED | OUTPATIENT
Start: 2021-01-01 | End: 2021-01-01 | Stop reason: HOSPADM

## 2021-01-01 RX ORDER — GUAIFENESIN/DEXTROMETHORPHAN 100-10MG/5
5 SYRUP ORAL EVERY 4 HOURS PRN
Status: DISCONTINUED | OUTPATIENT
Start: 2021-01-01 | End: 2021-01-01

## 2021-01-01 RX ORDER — BISACODYL 10 MG
10 SUPPOSITORY, RECTAL RECTAL DAILY
Status: DISCONTINUED | OUTPATIENT
Start: 2021-01-01 | End: 2021-01-01

## 2021-01-01 RX ORDER — MIDAZOLAM IN NACL,ISO-OSMOT/PF 50 MG/50ML
1-10 INFUSION BOTTLE (ML) INTRAVENOUS CONTINUOUS
Status: DISCONTINUED | OUTPATIENT
Start: 2021-01-01 | End: 2021-01-01 | Stop reason: HOSPADM

## 2021-01-01 RX ORDER — LISINOPRIL 20 MG/1
20 TABLET ORAL DAILY
Status: DISCONTINUED | OUTPATIENT
Start: 2021-01-01 | End: 2021-01-01

## 2021-01-01 RX ORDER — DEXTROSE, SODIUM CHLORIDE, SODIUM LACTATE, POTASSIUM CHLORIDE, AND CALCIUM CHLORIDE 5; .6; .31; .03; .02 G/100ML; G/100ML; G/100ML; G/100ML; G/100ML
INJECTION, SOLUTION INTRAVENOUS CONTINUOUS
Status: DISCONTINUED | OUTPATIENT
Start: 2021-01-01 | End: 2021-01-01

## 2021-01-01 RX ORDER — KETAMINE HYDROCHLORIDE 10 MG/ML
100 INJECTION, SOLUTION INTRAMUSCULAR; INTRAVENOUS ONCE
Status: COMPLETED | OUTPATIENT
Start: 2021-01-01 | End: 2021-01-01

## 2021-01-01 RX ORDER — ACETAMINOPHEN 325 MG/1
650 TABLET ORAL EVERY 6 HOURS PRN
Status: DISCONTINUED | OUTPATIENT
Start: 2021-01-01 | End: 2021-01-01 | Stop reason: SDUPTHER

## 2021-01-01 RX ORDER — CIPROFLOXACIN 2 MG/ML
400 INJECTION, SOLUTION INTRAVENOUS EVERY 12 HOURS
Status: DISCONTINUED | OUTPATIENT
Start: 2021-01-01 | End: 2021-01-01

## 2021-01-01 RX ORDER — ATROPINE SULFATE 0.1 MG/ML
INJECTION INTRAVENOUS
Status: DISPENSED
Start: 2021-01-01 | End: 2021-01-01

## 2021-01-01 RX ORDER — HYDRALAZINE HYDROCHLORIDE 20 MG/ML
5 INJECTION INTRAMUSCULAR; INTRAVENOUS ONCE
Status: COMPLETED | OUTPATIENT
Start: 2021-01-01 | End: 2021-01-01

## 2021-01-01 RX ORDER — SODIUM CHLORIDE 0.9 % (FLUSH) 0.9 %
5-40 SYRINGE (ML) INJECTION PRN
Status: DISCONTINUED | OUTPATIENT
Start: 2021-01-01 | End: 2021-01-01 | Stop reason: HOSPADM

## 2021-01-01 RX ORDER — DEXAMETHASONE SODIUM PHOSPHATE 4 MG/ML
6 INJECTION, SOLUTION INTRA-ARTICULAR; INTRALESIONAL; INTRAMUSCULAR; INTRAVENOUS; SOFT TISSUE EVERY 24 HOURS
Status: DISCONTINUED | OUTPATIENT
Start: 2021-01-01 | End: 2021-01-01

## 2021-01-01 RX ORDER — SODIUM CHLORIDE 9 MG/ML
INJECTION, SOLUTION INTRAVENOUS CONTINUOUS
Status: DISCONTINUED | OUTPATIENT
Start: 2021-01-01 | End: 2021-01-01 | Stop reason: HOSPADM

## 2021-01-01 RX ORDER — FUROSEMIDE 10 MG/ML
40 INJECTION INTRAMUSCULAR; INTRAVENOUS ONCE
Status: DISCONTINUED | OUTPATIENT
Start: 2021-01-01 | End: 2021-01-01

## 2021-01-01 RX ORDER — LOSARTAN POTASSIUM 25 MG/1
25 TABLET ORAL DAILY
Status: DISCONTINUED | OUTPATIENT
Start: 2021-01-01 | End: 2021-01-01 | Stop reason: HOSPADM

## 2021-01-01 RX ORDER — METOPROLOL TARTRATE 50 MG/1
50 TABLET, FILM COATED ORAL 2 TIMES DAILY
Status: DISCONTINUED | OUTPATIENT
Start: 2021-01-01 | End: 2021-01-01

## 2021-01-01 RX ORDER — ACETAMINOPHEN 650 MG/1
650 SUPPOSITORY RECTAL EVERY 6 HOURS PRN
Status: DISCONTINUED | OUTPATIENT
Start: 2021-01-01 | End: 2021-01-01 | Stop reason: SDUPTHER

## 2021-01-01 RX ORDER — MIDAZOLAM HYDROCHLORIDE 1 MG/ML
4 INJECTION INTRAMUSCULAR; INTRAVENOUS ONCE
Status: COMPLETED | OUTPATIENT
Start: 2021-01-01 | End: 2021-01-01

## 2021-01-01 RX ORDER — ZINC GLUCONATE 50 MG
50 TABLET ORAL DAILY
Status: DISCONTINUED | OUTPATIENT
Start: 2021-01-01 | End: 2021-01-01 | Stop reason: CLARIF

## 2021-01-01 RX ORDER — BUSPIRONE HYDROCHLORIDE 10 MG/1
10 TABLET ORAL 3 TIMES DAILY
Status: DISCONTINUED | OUTPATIENT
Start: 2021-01-01 | End: 2021-01-01 | Stop reason: HOSPADM

## 2021-01-01 RX ORDER — DEXTROSE MONOHYDRATE 50 MG/ML
100 INJECTION, SOLUTION INTRAVENOUS PRN
Status: DISCONTINUED | OUTPATIENT
Start: 2021-01-01 | End: 2021-01-01 | Stop reason: HOSPADM

## 2021-01-01 RX ORDER — ONDANSETRON 4 MG/1
4 TABLET, ORALLY DISINTEGRATING ORAL EVERY 8 HOURS PRN
Status: DISCONTINUED | OUTPATIENT
Start: 2021-01-01 | End: 2021-01-01 | Stop reason: HOSPADM

## 2021-01-01 RX ORDER — FUROSEMIDE 10 MG/ML
40 INJECTION INTRAMUSCULAR; INTRAVENOUS DAILY
Status: DISCONTINUED | OUTPATIENT
Start: 2021-01-01 | End: 2021-01-01 | Stop reason: HOSPADM

## 2021-01-01 RX ORDER — FUROSEMIDE 20 MG/1
20 TABLET ORAL 2 TIMES DAILY
Status: DISCONTINUED | OUTPATIENT
Start: 2021-01-01 | End: 2021-01-01

## 2021-01-01 RX ORDER — DEXAMETHASONE SODIUM PHOSPHATE 4 MG/ML
4 INJECTION, SOLUTION INTRA-ARTICULAR; INTRALESIONAL; INTRAMUSCULAR; INTRAVENOUS; SOFT TISSUE EVERY 6 HOURS
Status: DISCONTINUED | OUTPATIENT
Start: 2021-01-01 | End: 2021-01-01

## 2021-01-01 RX ORDER — DOPAMINE HYDROCHLORIDE 160 MG/100ML
2-20 INJECTION, SOLUTION INTRAVENOUS CONTINUOUS
Status: DISCONTINUED | OUTPATIENT
Start: 2021-01-01 | End: 2021-01-01

## 2021-01-01 RX ORDER — POLYETHYLENE GLYCOL 3350 17 G/17G
17 POWDER, FOR SOLUTION ORAL DAILY PRN
Status: DISCONTINUED | OUTPATIENT
Start: 2021-01-01 | End: 2021-01-01

## 2021-01-01 RX ORDER — DOCUSATE SODIUM 50 MG/5ML
100 LIQUID ORAL 2 TIMES DAILY
Status: DISCONTINUED | OUTPATIENT
Start: 2021-01-01 | End: 2021-01-01 | Stop reason: HOSPADM

## 2021-01-01 RX ORDER — SODIUM CHLORIDE 0.9 % (FLUSH) 0.9 %
5-40 SYRINGE (ML) INJECTION EVERY 12 HOURS SCHEDULED
Status: DISCONTINUED | OUTPATIENT
Start: 2021-01-01 | End: 2021-01-01 | Stop reason: HOSPADM

## 2021-01-01 RX ORDER — DEXAMETHASONE 4 MG/1
10 TABLET ORAL DAILY
Status: DISCONTINUED | OUTPATIENT
Start: 2021-01-01 | End: 2021-01-01

## 2021-01-01 RX ORDER — LORAZEPAM 2 MG/ML
3 INJECTION INTRAMUSCULAR ONCE
Status: COMPLETED | OUTPATIENT
Start: 2021-01-01 | End: 2021-01-01

## 2021-01-01 RX ORDER — 0.9 % SODIUM CHLORIDE 0.9 %
1000 INTRAVENOUS SOLUTION INTRAVENOUS ONCE
Status: COMPLETED | OUTPATIENT
Start: 2021-01-01 | End: 2021-01-01

## 2021-01-01 RX ORDER — DEXTROSE MONOHYDRATE 25 G/50ML
12.5 INJECTION, SOLUTION INTRAVENOUS PRN
Status: DISCONTINUED | OUTPATIENT
Start: 2021-01-01 | End: 2021-01-01 | Stop reason: HOSPADM

## 2021-01-01 RX ORDER — VITAMIN B COMPLEX
2000 TABLET ORAL DAILY
Status: DISCONTINUED | OUTPATIENT
Start: 2021-01-01 | End: 2021-01-01 | Stop reason: HOSPADM

## 2021-01-01 RX ORDER — FUROSEMIDE 10 MG/ML
20 INJECTION INTRAMUSCULAR; INTRAVENOUS ONCE
Status: DISCONTINUED | OUTPATIENT
Start: 2021-01-01 | End: 2021-01-01

## 2021-01-01 RX ORDER — PANTOPRAZOLE SODIUM 40 MG/1
40 TABLET, DELAYED RELEASE ORAL
Status: DISCONTINUED | OUTPATIENT
Start: 2021-01-01 | End: 2021-01-01

## 2021-01-01 RX ADMIN — DEXMEDETOMIDINE HYDROCHLORIDE 0.5 MCG/KG/HR: 4 INJECTION INTRAVENOUS at 13:21

## 2021-01-01 RX ADMIN — BUSPIRONE HYDROCHLORIDE 10 MG: 10 TABLET ORAL at 14:21

## 2021-01-01 RX ADMIN — SODIUM CHLORIDE, PRESERVATIVE FREE 10 ML: 5 INJECTION INTRAVENOUS at 08:29

## 2021-01-01 RX ADMIN — SODIUM CHLORIDE: 9 INJECTION, SOLUTION INTRAVENOUS at 16:13

## 2021-01-01 RX ADMIN — SODIUM CHLORIDE: 9 INJECTION, SOLUTION INTRAVENOUS at 22:13

## 2021-01-01 RX ADMIN — LISINOPRIL 20 MG: 20 TABLET ORAL at 08:06

## 2021-01-01 RX ADMIN — PROPOFOL 25 MCG/KG/MIN: 10 INJECTION, EMULSION INTRAVENOUS at 18:03

## 2021-01-01 RX ADMIN — POLYETHYLENE GLYCOL 3350 17 G: 17 POWDER, FOR SOLUTION ORAL at 12:20

## 2021-01-01 RX ADMIN — BUSPIRONE HYDROCHLORIDE 10 MG: 10 TABLET ORAL at 20:49

## 2021-01-01 RX ADMIN — BUSPIRONE HYDROCHLORIDE 10 MG: 10 TABLET ORAL at 08:35

## 2021-01-01 RX ADMIN — Medication 10 MG/HR: at 17:51

## 2021-01-01 RX ADMIN — Medication 2000 UNITS: at 08:18

## 2021-01-01 RX ADMIN — FUROSEMIDE 20 MG: 10 INJECTION, SOLUTION INTRAMUSCULAR; INTRAVENOUS at 08:38

## 2021-01-01 RX ADMIN — CEFTRIAXONE SODIUM 1000 MG: 1 INJECTION, POWDER, FOR SOLUTION INTRAMUSCULAR; INTRAVENOUS at 10:42

## 2021-01-01 RX ADMIN — ENOXAPARIN SODIUM 105 MG: 150 INJECTION SUBCUTANEOUS at 20:44

## 2021-01-01 RX ADMIN — FAMOTIDINE 20 MG: 20 TABLET ORAL at 08:50

## 2021-01-01 RX ADMIN — FUROSEMIDE 20 MG: 40 INJECTION, SOLUTION INTRAMUSCULAR; INTRAVENOUS at 16:35

## 2021-01-01 RX ADMIN — DEXMEDETOMIDINE HYDROCHLORIDE 0.8 MCG/KG/HR: 4 INJECTION INTRAVENOUS at 11:04

## 2021-01-01 RX ADMIN — FENTANYL CITRATE 200 MCG/HR: 50 INJECTION INTRAMUSCULAR; INTRAVENOUS at 13:32

## 2021-01-01 RX ADMIN — Medication 100 MG: at 08:27

## 2021-01-01 RX ADMIN — CIPROFLOXACIN 400 MG: 2 INJECTION, SOLUTION INTRAVENOUS at 23:35

## 2021-01-01 RX ADMIN — ENOXAPARIN SODIUM 105 MG: 150 INJECTION SUBCUTANEOUS at 08:50

## 2021-01-01 RX ADMIN — DEXAMETHASONE SODIUM PHOSPHATE 4 MG: 4 INJECTION, SOLUTION INTRA-ARTICULAR; INTRALESIONAL; INTRAMUSCULAR; INTRAVENOUS; SOFT TISSUE at 02:24

## 2021-01-01 RX ADMIN — Medication 10 MG/HR: at 04:34

## 2021-01-01 RX ADMIN — BUSPIRONE HYDROCHLORIDE 10 MG: 10 TABLET ORAL at 08:06

## 2021-01-01 RX ADMIN — METOPROLOL TARTRATE 25 MG: 25 TABLET, FILM COATED ORAL at 20:20

## 2021-01-01 RX ADMIN — AMIODARONE HYDROCHLORIDE 0.5 MG/MIN: 1.8 INJECTION, SOLUTION INTRAVENOUS at 03:22

## 2021-01-01 RX ADMIN — Medication 8.8 MG: at 21:05

## 2021-01-01 RX ADMIN — METOPROLOL TARTRATE 50 MG: 50 TABLET, FILM COATED ORAL at 20:21

## 2021-01-01 RX ADMIN — Medication 5 MCG/MIN: at 07:24

## 2021-01-01 RX ADMIN — ENOXAPARIN SODIUM 105 MG: 150 INJECTION SUBCUTANEOUS at 14:57

## 2021-01-01 RX ADMIN — DEXAMETHASONE SODIUM PHOSPHATE 20 MG: 4 INJECTION, SOLUTION INTRAMUSCULAR; INTRAVENOUS at 11:55

## 2021-01-01 RX ADMIN — BUSPIRONE HYDROCHLORIDE 10 MG: 10 TABLET ORAL at 09:21

## 2021-01-01 RX ADMIN — FAMOTIDINE 20 MG: 20 TABLET ORAL at 09:44

## 2021-01-01 RX ADMIN — DEXAMETHASONE SODIUM PHOSPHATE 10 MG: 4 INJECTION, SOLUTION INTRA-ARTICULAR; INTRALESIONAL; INTRAMUSCULAR; INTRAVENOUS; SOFT TISSUE at 08:51

## 2021-01-01 RX ADMIN — METOPROLOL TARTRATE 25 MG: 25 TABLET, FILM COATED ORAL at 08:25

## 2021-01-01 RX ADMIN — SODIUM CHLORIDE: 9 INJECTION, SOLUTION INTRAVENOUS at 05:43

## 2021-01-01 RX ADMIN — SODIUM CHLORIDE, PRESERVATIVE FREE 10 ML: 5 INJECTION INTRAVENOUS at 21:03

## 2021-01-01 RX ADMIN — Medication 2000 UNITS: at 08:04

## 2021-01-01 RX ADMIN — CIPROFLOXACIN 400 MG: 2 INJECTION, SOLUTION INTRAVENOUS at 11:57

## 2021-01-01 RX ADMIN — METOPROLOL TARTRATE 50 MG: 50 TABLET, FILM COATED ORAL at 21:57

## 2021-01-01 RX ADMIN — SODIUM CHLORIDE, PRESERVATIVE FREE 10 ML: 5 INJECTION INTRAVENOUS at 19:27

## 2021-01-01 RX ADMIN — FENTANYL CITRATE 125 MCG/HR: 50 INJECTION INTRAMUSCULAR; INTRAVENOUS at 05:27

## 2021-01-01 RX ADMIN — Medication 10 MG/HR: at 13:12

## 2021-01-01 RX ADMIN — Medication 9 MG/HR: at 07:51

## 2021-01-01 RX ADMIN — Medication 50 MG: at 10:06

## 2021-01-01 RX ADMIN — FAMOTIDINE 20 MG: 10 INJECTION, SOLUTION INTRAVENOUS at 21:38

## 2021-01-01 RX ADMIN — BUSPIRONE HYDROCHLORIDE 10 MG: 10 TABLET ORAL at 20:14

## 2021-01-01 RX ADMIN — FENTANYL CITRATE 200 MCG/HR: 50 INJECTION INTRAMUSCULAR; INTRAVENOUS at 19:01

## 2021-01-01 RX ADMIN — BARICITINIB 4 MG: 2 TABLET, FILM COATED ORAL at 08:24

## 2021-01-01 RX ADMIN — PANTOPRAZOLE SODIUM 40 MG: 40 TABLET, DELAYED RELEASE ORAL at 05:17

## 2021-01-01 RX ADMIN — SODIUM CHLORIDE: 9 INJECTION, SOLUTION INTRAVENOUS at 03:08

## 2021-01-01 RX ADMIN — BUSPIRONE HYDROCHLORIDE 10 MG: 10 TABLET ORAL at 08:50

## 2021-01-01 RX ADMIN — Medication 2000 UNITS: at 08:09

## 2021-01-01 RX ADMIN — PROPOFOL 50 MCG/KG/MIN: 10 INJECTION, EMULSION INTRAVENOUS at 12:30

## 2021-01-01 RX ADMIN — Medication 10 MCG/MIN: at 09:33

## 2021-01-01 RX ADMIN — PROPOFOL 25 MCG/KG/MIN: 10 INJECTION, EMULSION INTRAVENOUS at 08:33

## 2021-01-01 RX ADMIN — POLYETHYLENE GLYCOL 3350 17 G: 17 POWDER, FOR SOLUTION ORAL at 08:50

## 2021-01-01 RX ADMIN — Medication 2000 UNITS: at 08:27

## 2021-01-01 RX ADMIN — SODIUM CHLORIDE 1000 ML: 9 INJECTION, SOLUTION INTRAVENOUS at 11:17

## 2021-01-01 RX ADMIN — BUSPIRONE HYDROCHLORIDE 10 MG: 10 TABLET ORAL at 08:09

## 2021-01-01 RX ADMIN — AMIODARONE HYDROCHLORIDE 0.5 MG/MIN: 1.8 INJECTION, SOLUTION INTRAVENOUS at 15:53

## 2021-01-01 RX ADMIN — GUAIFENESIN AND DEXTROMETHORPHAN 10 ML: 100; 10 SYRUP ORAL at 22:45

## 2021-01-01 RX ADMIN — METOPROLOL TARTRATE 50 MG: 50 TABLET, FILM COATED ORAL at 09:44

## 2021-01-01 RX ADMIN — DOPAMINE HYDROCHLORIDE IN DEXTROSE 2 MCG/KG/MIN: 1.6 INJECTION, SOLUTION INTRAVENOUS at 09:47

## 2021-01-01 RX ADMIN — FAMOTIDINE 20 MG: 10 INJECTION, SOLUTION INTRAVENOUS at 19:29

## 2021-01-01 RX ADMIN — BUSPIRONE HYDROCHLORIDE 10 MG: 10 TABLET ORAL at 16:26

## 2021-01-01 RX ADMIN — FAMOTIDINE 20 MG: 10 INJECTION, SOLUTION INTRAVENOUS at 07:53

## 2021-01-01 RX ADMIN — PHENYLEPHRINE HYDROCHLORIDE 250 MCG/MIN: 10 INJECTION INTRAVENOUS at 03:16

## 2021-01-01 RX ADMIN — SODIUM CHLORIDE, PRESERVATIVE FREE 10 ML: 5 INJECTION INTRAVENOUS at 09:16

## 2021-01-01 RX ADMIN — BUSPIRONE HYDROCHLORIDE 10 MG: 10 TABLET ORAL at 13:49

## 2021-01-01 RX ADMIN — Medication 9 MG/HR: at 19:46

## 2021-01-01 RX ADMIN — Medication 4.5 MG: at 20:34

## 2021-01-01 RX ADMIN — Medication 50 MG: at 09:26

## 2021-01-01 RX ADMIN — ENOXAPARIN SODIUM 105 MG: 150 INJECTION SUBCUTANEOUS at 21:03

## 2021-01-01 RX ADMIN — FUROSEMIDE 40 MG: 10 INJECTION, SOLUTION INTRAMUSCULAR; INTRAVENOUS at 08:34

## 2021-01-01 RX ADMIN — KETAMINE HYDROCHLORIDE 0.8 MG/KG/HR: 50 INJECTION, SOLUTION INTRAMUSCULAR; INTRAVENOUS at 04:30

## 2021-01-01 RX ADMIN — SODIUM CHLORIDE, PRESERVATIVE FREE 10 ML: 5 INJECTION INTRAVENOUS at 16:26

## 2021-01-01 RX ADMIN — AMIODARONE HYDROCHLORIDE 0.5 MG/MIN: 1.8 INJECTION, SOLUTION INTRAVENOUS at 02:47

## 2021-01-01 RX ADMIN — DEXMEDETOMIDINE HYDROCHLORIDE 0.8 MCG/KG/HR: 4 INJECTION INTRAVENOUS at 06:00

## 2021-01-01 RX ADMIN — CIPROFLOXACIN 400 MG: 2 INJECTION, SOLUTION INTRAVENOUS at 12:29

## 2021-01-01 RX ADMIN — METOPROLOL TARTRATE 50 MG: 50 TABLET, FILM COATED ORAL at 20:34

## 2021-01-01 RX ADMIN — SODIUM CHLORIDE, SODIUM LACTATE, POTASSIUM CHLORIDE, CALCIUM CHLORIDE AND DEXTROSE MONOHYDRATE: 5; 600; 310; 30; 20 INJECTION, SOLUTION INTRAVENOUS at 20:50

## 2021-01-01 RX ADMIN — CIPROFLOXACIN 400 MG: 2 INJECTION, SOLUTION INTRAVENOUS at 00:59

## 2021-01-01 RX ADMIN — BUSPIRONE HYDROCHLORIDE 10 MG: 10 TABLET ORAL at 15:11

## 2021-01-01 RX ADMIN — FENTANYL CITRATE 150 MCG/HR: 50 INJECTION INTRAMUSCULAR; INTRAVENOUS at 16:15

## 2021-01-01 RX ADMIN — OXYCODONE HYDROCHLORIDE AND ACETAMINOPHEN 1000 MG: 500 TABLET ORAL at 08:24

## 2021-01-01 RX ADMIN — Medication 2000 UNITS: at 08:36

## 2021-01-01 RX ADMIN — FENTANYL CITRATE 200 MCG/HR: 50 INJECTION INTRAMUSCULAR; INTRAVENOUS at 22:03

## 2021-01-01 RX ADMIN — BARICITINIB 4 MG: 2 TABLET, FILM COATED ORAL at 08:18

## 2021-01-01 RX ADMIN — ENOXAPARIN SODIUM 105 MG: 150 INJECTION SUBCUTANEOUS at 08:17

## 2021-01-01 RX ADMIN — FENTANYL CITRATE 200 MCG/HR: 50 INJECTION INTRAMUSCULAR; INTRAVENOUS at 15:55

## 2021-01-01 RX ADMIN — PHENYLEPHRINE HYDROCHLORIDE 225 MCG/MIN: 10 INJECTION INTRAVENOUS at 15:52

## 2021-01-01 RX ADMIN — FAMOTIDINE 20 MG: 20 TABLET ORAL at 20:40

## 2021-01-01 RX ADMIN — CIPROFLOXACIN 400 MG: 2 INJECTION, SOLUTION INTRAVENOUS at 23:38

## 2021-01-01 RX ADMIN — AMIODARONE HYDROCHLORIDE 0.5 MG/MIN: 1.8 INJECTION, SOLUTION INTRAVENOUS at 14:46

## 2021-01-01 RX ADMIN — BUSPIRONE HYDROCHLORIDE 10 MG: 10 TABLET ORAL at 20:34

## 2021-01-01 RX ADMIN — Medication 2000 UNITS: at 08:37

## 2021-01-01 RX ADMIN — FAMOTIDINE 20 MG: 20 TABLET ORAL at 20:57

## 2021-01-01 RX ADMIN — FENTANYL CITRATE 150 MCG/HR: 50 INJECTION INTRAMUSCULAR; INTRAVENOUS at 05:15

## 2021-01-01 RX ADMIN — ENOXAPARIN SODIUM 105 MG: 150 INJECTION SUBCUTANEOUS at 20:14

## 2021-01-01 RX ADMIN — LORAZEPAM 1 MG: 2 INJECTION INTRAMUSCULAR; INTRAVENOUS at 01:59

## 2021-01-01 RX ADMIN — Medication 2000 UNITS: at 08:51

## 2021-01-01 RX ADMIN — DEXAMETHASONE SODIUM PHOSPHATE 4 MG: 4 INJECTION, SOLUTION INTRA-ARTICULAR; INTRALESIONAL; INTRAMUSCULAR; INTRAVENOUS; SOFT TISSUE at 21:56

## 2021-01-01 RX ADMIN — Medication 50 MG: at 08:37

## 2021-01-01 RX ADMIN — FAMOTIDINE 20 MG: 10 INJECTION, SOLUTION INTRAVENOUS at 15:19

## 2021-01-01 RX ADMIN — ACETAMINOPHEN 650 MG: 325 TABLET ORAL at 20:21

## 2021-01-01 RX ADMIN — METOPROLOL TARTRATE 50 MG: 50 TABLET, FILM COATED ORAL at 08:18

## 2021-01-01 RX ADMIN — GUAIFENESIN AND DEXTROMETHORPHAN 10 ML: 100; 10 SYRUP ORAL at 20:40

## 2021-01-01 RX ADMIN — FAMOTIDINE 20 MG: 10 INJECTION, SOLUTION INTRAVENOUS at 08:08

## 2021-01-01 RX ADMIN — ENOXAPARIN SODIUM 105 MG: 150 INJECTION SUBCUTANEOUS at 21:59

## 2021-01-01 RX ADMIN — FUROSEMIDE 40 MG: 10 INJECTION, SOLUTION INTRAMUSCULAR; INTRAVENOUS at 06:19

## 2021-01-01 RX ADMIN — Medication 50 MG: at 08:24

## 2021-01-01 RX ADMIN — Medication 2000 UNITS: at 09:17

## 2021-01-01 RX ADMIN — FENTANYL CITRATE 125 MCG/HR: 50 INJECTION INTRAMUSCULAR; INTRAVENOUS at 18:05

## 2021-01-01 RX ADMIN — ENOXAPARIN SODIUM 105 MG: 150 INJECTION SUBCUTANEOUS at 08:28

## 2021-01-01 RX ADMIN — AMIODARONE HYDROCHLORIDE 0.5 MG/MIN: 1.8 INJECTION, SOLUTION INTRAVENOUS at 15:04

## 2021-01-01 RX ADMIN — PROPOFOL 45 MCG/KG/MIN: 10 INJECTION, EMULSION INTRAVENOUS at 01:29

## 2021-01-01 RX ADMIN — PROPOFOL 45 MCG/KG/MIN: 10 INJECTION, EMULSION INTRAVENOUS at 12:24

## 2021-01-01 RX ADMIN — LORAZEPAM 3 MG: 2 INJECTION INTRAMUSCULAR; INTRAVENOUS at 19:07

## 2021-01-01 RX ADMIN — Medication 7 MG/HR: at 02:43

## 2021-01-01 RX ADMIN — Medication 3 MG: at 20:57

## 2021-01-01 RX ADMIN — Medication 3 MG: at 20:21

## 2021-01-01 RX ADMIN — ENOXAPARIN SODIUM 105 MG: 150 INJECTION SUBCUTANEOUS at 08:13

## 2021-01-01 RX ADMIN — Medication 25 MCG/HR: at 09:15

## 2021-01-01 RX ADMIN — FAMOTIDINE 20 MG: 10 INJECTION, SOLUTION INTRAVENOUS at 08:03

## 2021-01-01 RX ADMIN — SODIUM CHLORIDE: 9 INJECTION, SOLUTION INTRAVENOUS at 15:46

## 2021-01-01 RX ADMIN — ENOXAPARIN SODIUM 105 MG: 150 INJECTION SUBCUTANEOUS at 08:51

## 2021-01-01 RX ADMIN — KETAMINE HYDROCHLORIDE 0.5 MG/KG/HR: 50 INJECTION, SOLUTION INTRAMUSCULAR; INTRAVENOUS at 03:25

## 2021-01-01 RX ADMIN — POLYETHYLENE GLYCOL 3350 17 G: 17 POWDER, FOR SOLUTION ORAL at 11:37

## 2021-01-01 RX ADMIN — CEFTRIAXONE SODIUM 1000 MG: 1 INJECTION, POWDER, FOR SOLUTION INTRAMUSCULAR; INTRAVENOUS at 10:52

## 2021-01-01 RX ADMIN — IOPAMIDOL 80 ML: 755 INJECTION, SOLUTION INTRAVENOUS at 07:42

## 2021-01-01 RX ADMIN — FAMOTIDINE 20 MG: 10 INJECTION, SOLUTION INTRAVENOUS at 08:10

## 2021-01-01 RX ADMIN — FENTANYL CITRATE 200 MCG/HR: 50 INJECTION INTRAMUSCULAR; INTRAVENOUS at 10:00

## 2021-01-01 RX ADMIN — DEXMEDETOMIDINE HYDROCHLORIDE 0.2 MCG/KG/HR: 4 INJECTION INTRAVENOUS at 18:52

## 2021-01-01 RX ADMIN — FENTANYL CITRATE 175 MCG/HR: 50 INJECTION INTRAMUSCULAR; INTRAVENOUS at 02:04

## 2021-01-01 RX ADMIN — FUROSEMIDE 20 MG: 40 INJECTION, SOLUTION INTRAMUSCULAR; INTRAVENOUS at 11:20

## 2021-01-01 RX ADMIN — Medication 100 MG: at 09:00

## 2021-01-01 RX ADMIN — ENOXAPARIN SODIUM 105 MG: 150 INJECTION SUBCUTANEOUS at 08:36

## 2021-01-01 RX ADMIN — FENTANYL CITRATE 150 MCG/HR: 50 INJECTION INTRAMUSCULAR; INTRAVENOUS at 01:35

## 2021-01-01 RX ADMIN — OXYCODONE HYDROCHLORIDE AND ACETAMINOPHEN 1000 MG: 500 TABLET ORAL at 08:37

## 2021-01-01 RX ADMIN — FENTANYL CITRATE 175 MCG/HR: 50 INJECTION INTRAMUSCULAR; INTRAVENOUS at 10:00

## 2021-01-01 RX ADMIN — ACETAMINOPHEN 650 MG: 325 TABLET ORAL at 20:57

## 2021-01-01 RX ADMIN — ENOXAPARIN SODIUM 105 MG: 150 INJECTION SUBCUTANEOUS at 20:21

## 2021-01-01 RX ADMIN — OXYCODONE HYDROCHLORIDE AND ACETAMINOPHEN 1000 MG: 500 TABLET ORAL at 08:59

## 2021-01-01 RX ADMIN — DIPHENHYDRAMINE HYDROCHLORIDE 25 MG: 25 TABLET ORAL at 00:44

## 2021-01-01 RX ADMIN — SODIUM CHLORIDE, PRESERVATIVE FREE 10 ML: 5 INJECTION INTRAVENOUS at 07:59

## 2021-01-01 RX ADMIN — CISATRACURIUM BESYLATE 2.99 MCG/KG/MIN: 10 INJECTION, SOLUTION INTRAVENOUS at 15:51

## 2021-01-01 RX ADMIN — Medication 10 MG/HR: at 22:06

## 2021-01-01 RX ADMIN — DEXAMETHASONE SODIUM PHOSPHATE 10 MG: 4 INJECTION, SOLUTION INTRA-ARTICULAR; INTRALESIONAL; INTRAMUSCULAR; INTRAVENOUS; SOFT TISSUE at 08:19

## 2021-01-01 RX ADMIN — Medication 9 MG/HR: at 06:59

## 2021-01-01 RX ADMIN — DEXMEDETOMIDINE HYDROCHLORIDE 0.6 MCG/KG/HR: 4 INJECTION INTRAVENOUS at 20:48

## 2021-01-01 RX ADMIN — FAMOTIDINE 20 MG: 10 INJECTION, SOLUTION INTRAVENOUS at 20:46

## 2021-01-01 RX ADMIN — FENTANYL CITRATE 175 MCG/HR: 50 INJECTION INTRAMUSCULAR; INTRAVENOUS at 17:52

## 2021-01-01 RX ADMIN — METOPROLOL TARTRATE 50 MG: 50 TABLET, FILM COATED ORAL at 20:25

## 2021-01-01 RX ADMIN — SODIUM CHLORIDE, PRESERVATIVE FREE 5 ML: 5 INJECTION INTRAVENOUS at 08:22

## 2021-01-01 RX ADMIN — METOPROLOL TARTRATE 25 MG: 50 TABLET, FILM COATED ORAL at 09:26

## 2021-01-01 RX ADMIN — PROPOFOL 35 MCG/KG/MIN: 10 INJECTION, EMULSION INTRAVENOUS at 12:26

## 2021-01-01 RX ADMIN — Medication 2000 UNITS: at 08:24

## 2021-01-01 RX ADMIN — LORAZEPAM 2 MG: 2 INJECTION INTRAMUSCULAR; INTRAVENOUS at 17:50

## 2021-01-01 RX ADMIN — CISATRACURIUM BESYLATE 3 MCG/KG/MIN: 10 INJECTION, SOLUTION INTRAVENOUS at 19:55

## 2021-01-01 RX ADMIN — SODIUM CHLORIDE, PRESERVATIVE FREE 10 ML: 5 INJECTION INTRAVENOUS at 20:44

## 2021-01-01 RX ADMIN — CIPROFLOXACIN 400 MG: 2 INJECTION, SOLUTION INTRAVENOUS at 11:42

## 2021-01-01 RX ADMIN — POLYETHYLENE GLYCOL 3350 17 G: 17 POWDER, FOR SOLUTION ORAL at 08:17

## 2021-01-01 RX ADMIN — BUSPIRONE HYDROCHLORIDE 10 MG: 10 TABLET ORAL at 13:42

## 2021-01-01 RX ADMIN — CEFTRIAXONE SODIUM 1000 MG: 1 INJECTION, POWDER, FOR SOLUTION INTRAMUSCULAR; INTRAVENOUS at 10:34

## 2021-01-01 RX ADMIN — BENZONATATE 100 MG: 100 CAPSULE ORAL at 19:59

## 2021-01-01 RX ADMIN — PHENYLEPHRINE HYDROCHLORIDE 225 MCG/MIN: 10 INJECTION INTRAVENOUS at 07:18

## 2021-01-01 RX ADMIN — BARICITINIB 4 MG: 2 TABLET, FILM COATED ORAL at 17:50

## 2021-01-01 RX ADMIN — PROPOFOL 45 MCG/KG/MIN: 10 INJECTION, EMULSION INTRAVENOUS at 22:18

## 2021-01-01 RX ADMIN — SODIUM CHLORIDE, PRESERVATIVE FREE 10 ML: 5 INJECTION INTRAVENOUS at 20:23

## 2021-01-01 RX ADMIN — ENOXAPARIN SODIUM 105 MG: 150 INJECTION SUBCUTANEOUS at 20:08

## 2021-01-01 RX ADMIN — Medication 10 MG/HR: at 00:14

## 2021-01-01 RX ADMIN — FAMOTIDINE 20 MG: 20 TABLET ORAL at 08:05

## 2021-01-01 RX ADMIN — FAMOTIDINE 20 MG: 10 INJECTION, SOLUTION INTRAVENOUS at 21:57

## 2021-01-01 RX ADMIN — Medication 100 MG: at 00:37

## 2021-01-01 RX ADMIN — OXYCODONE HYDROCHLORIDE AND ACETAMINOPHEN 1000 MG: 500 TABLET ORAL at 08:05

## 2021-01-01 RX ADMIN — Medication 50 MG: at 09:21

## 2021-01-01 RX ADMIN — DEXAMETHASONE 10 MG: 4 TABLET ORAL at 08:50

## 2021-01-01 RX ADMIN — FENTANYL CITRATE 200 MCG/HR: 50 INJECTION INTRAMUSCULAR; INTRAVENOUS at 07:46

## 2021-01-01 RX ADMIN — BENZONATATE 100 MG: 100 CAPSULE ORAL at 08:06

## 2021-01-01 RX ADMIN — KETAMINE HYDROCHLORIDE 0.2 MG/KG/HR: 50 INJECTION, SOLUTION INTRAMUSCULAR; INTRAVENOUS at 22:08

## 2021-01-01 RX ADMIN — BUSPIRONE HYDROCHLORIDE 10 MG: 10 TABLET ORAL at 20:08

## 2021-01-01 RX ADMIN — ENOXAPARIN SODIUM 105 MG: 150 INJECTION SUBCUTANEOUS at 21:28

## 2021-01-01 RX ADMIN — Medication 50 MG: at 11:11

## 2021-01-01 RX ADMIN — Medication 8 MG/HR: at 07:09

## 2021-01-01 RX ADMIN — AMIODARONE HYDROCHLORIDE 0.5 MG/MIN: 1.8 INJECTION, SOLUTION INTRAVENOUS at 14:43

## 2021-01-01 RX ADMIN — FENTANYL CITRATE 200 MCG/HR: 50 INJECTION INTRAMUSCULAR; INTRAVENOUS at 01:40

## 2021-01-01 RX ADMIN — SODIUM CHLORIDE, PRESERVATIVE FREE 10 ML: 5 INJECTION INTRAVENOUS at 08:39

## 2021-01-01 RX ADMIN — Medication 10 MG/HR: at 03:20

## 2021-01-01 RX ADMIN — OXYCODONE HYDROCHLORIDE AND ACETAMINOPHEN 1000 MG: 500 TABLET ORAL at 17:50

## 2021-01-01 RX ADMIN — LORAZEPAM 1 MG: 2 INJECTION INTRAMUSCULAR; INTRAVENOUS at 00:57

## 2021-01-01 RX ADMIN — DEXAMETHASONE SODIUM PHOSPHATE 4 MG: 4 INJECTION, SOLUTION INTRA-ARTICULAR; INTRALESIONAL; INTRAMUSCULAR; INTRAVENOUS; SOFT TISSUE at 08:03

## 2021-01-01 RX ADMIN — Medication 10 MG/HR: at 01:38

## 2021-01-01 RX ADMIN — DEXAMETHASONE SODIUM PHOSPHATE 10 MG: 4 INJECTION, SOLUTION INTRA-ARTICULAR; INTRALESIONAL; INTRAMUSCULAR; INTRAVENOUS; SOFT TISSUE at 08:34

## 2021-01-01 RX ADMIN — POLYETHYLENE GLYCOL 3350 17 G: 17 POWDER, FOR SOLUTION ORAL at 14:16

## 2021-01-01 RX ADMIN — LISINOPRIL 20 MG: 20 TABLET ORAL at 11:11

## 2021-01-01 RX ADMIN — Medication 10 MG/HR: at 17:50

## 2021-01-01 RX ADMIN — DIPHENHYDRAMINE HYDROCHLORIDE 25 MG: 25 TABLET ORAL at 20:25

## 2021-01-01 RX ADMIN — FUROSEMIDE 20 MG: 40 INJECTION, SOLUTION INTRAMUSCULAR; INTRAVENOUS at 14:46

## 2021-01-01 RX ADMIN — LORAZEPAM 1 MG: 2 INJECTION INTRAMUSCULAR; INTRAVENOUS at 05:32

## 2021-01-01 RX ADMIN — FAMOTIDINE 20 MG: 10 INJECTION, SOLUTION INTRAVENOUS at 20:08

## 2021-01-01 RX ADMIN — Medication 2000 UNITS: at 08:35

## 2021-01-01 RX ADMIN — SODIUM CHLORIDE, PRESERVATIVE FREE 10 ML: 5 INJECTION INTRAVENOUS at 09:32

## 2021-01-01 RX ADMIN — FAMOTIDINE 20 MG: 10 INJECTION, SOLUTION INTRAVENOUS at 09:13

## 2021-01-01 RX ADMIN — HEPARIN SODIUM AND DEXTROSE 14 UNITS/KG/HR: 10000; 5 INJECTION INTRAVENOUS at 19:58

## 2021-01-01 RX ADMIN — ENOXAPARIN SODIUM 105 MG: 150 INJECTION SUBCUTANEOUS at 20:49

## 2021-01-01 RX ADMIN — OXYCODONE HYDROCHLORIDE AND ACETAMINOPHEN 1000 MG: 500 TABLET ORAL at 08:02

## 2021-01-01 RX ADMIN — METOPROLOL TARTRATE 50 MG: 50 TABLET, FILM COATED ORAL at 21:03

## 2021-01-01 RX ADMIN — OXYCODONE HYDROCHLORIDE AND ACETAMINOPHEN 1000 MG: 500 TABLET ORAL at 09:44

## 2021-01-01 RX ADMIN — SODIUM CHLORIDE, PRESERVATIVE FREE 10 ML: 5 INJECTION INTRAVENOUS at 20:51

## 2021-01-01 RX ADMIN — OXYCODONE HYDROCHLORIDE AND ACETAMINOPHEN 1000 MG: 500 TABLET ORAL at 08:50

## 2021-01-01 RX ADMIN — SODIUM CHLORIDE: 9 INJECTION, SOLUTION INTRAVENOUS at 13:36

## 2021-01-01 RX ADMIN — BUSPIRONE HYDROCHLORIDE 10 MG: 10 TABLET ORAL at 09:17

## 2021-01-01 RX ADMIN — SODIUM CHLORIDE, SODIUM LACTATE, POTASSIUM CHLORIDE, CALCIUM CHLORIDE AND DEXTROSE MONOHYDRATE: 5; 600; 310; 30; 20 INJECTION, SOLUTION INTRAVENOUS at 04:18

## 2021-01-01 RX ADMIN — BUSPIRONE HYDROCHLORIDE 10 MG: 10 TABLET ORAL at 08:00

## 2021-01-01 RX ADMIN — FAMOTIDINE 20 MG: 10 INJECTION, SOLUTION INTRAVENOUS at 08:16

## 2021-01-01 RX ADMIN — SODIUM CHLORIDE: 9 INJECTION, SOLUTION INTRAVENOUS at 16:41

## 2021-01-01 RX ADMIN — Medication 50 MG: at 08:44

## 2021-01-01 RX ADMIN — FAMOTIDINE 20 MG: 20 TABLET ORAL at 20:21

## 2021-01-01 RX ADMIN — ENOXAPARIN SODIUM 105 MG: 150 INJECTION SUBCUTANEOUS at 20:24

## 2021-01-01 RX ADMIN — PROPOFOL 30 MCG/KG/MIN: 10 INJECTION, EMULSION INTRAVENOUS at 17:09

## 2021-01-01 RX ADMIN — MIDAZOLAM 4 MG: 1 INJECTION INTRAMUSCULAR; INTRAVENOUS at 09:00

## 2021-01-01 RX ADMIN — LISINOPRIL 10 MG: 10 TABLET ORAL at 08:18

## 2021-01-01 RX ADMIN — BUSPIRONE HYDROCHLORIDE 10 MG: 10 TABLET ORAL at 09:32

## 2021-01-01 RX ADMIN — KETAMINE HYDROCHLORIDE 1.1 MG/KG/HR: 50 INJECTION, SOLUTION INTRAMUSCULAR; INTRAVENOUS at 03:25

## 2021-01-01 RX ADMIN — SODIUM CHLORIDE, PRESERVATIVE FREE 10 ML: 5 INJECTION INTRAVENOUS at 20:28

## 2021-01-01 RX ADMIN — ENOXAPARIN SODIUM 105 MG: 150 INJECTION SUBCUTANEOUS at 21:06

## 2021-01-01 RX ADMIN — BUSPIRONE HYDROCHLORIDE 10 MG: 10 TABLET ORAL at 09:44

## 2021-01-01 RX ADMIN — DEXAMETHASONE SODIUM PHOSPHATE 4 MG: 4 INJECTION, SOLUTION INTRA-ARTICULAR; INTRALESIONAL; INTRAMUSCULAR; INTRAVENOUS; SOFT TISSUE at 20:08

## 2021-01-01 RX ADMIN — FAMOTIDINE 20 MG: 10 INJECTION, SOLUTION INTRAVENOUS at 20:44

## 2021-01-01 RX ADMIN — METOPROLOL TARTRATE 50 MG: 50 TABLET, FILM COATED ORAL at 08:50

## 2021-01-01 RX ADMIN — CEFTRIAXONE SODIUM 1000 MG: 1 INJECTION, POWDER, FOR SOLUTION INTRAMUSCULAR; INTRAVENOUS at 09:26

## 2021-01-01 RX ADMIN — FENTANYL CITRATE 200 MCG/HR: 50 INJECTION, SOLUTION INTRAMUSCULAR; INTRAVENOUS at 22:10

## 2021-01-01 RX ADMIN — BUSPIRONE HYDROCHLORIDE 10 MG: 10 TABLET ORAL at 08:03

## 2021-01-01 RX ADMIN — SODIUM CHLORIDE: 9 INJECTION, SOLUTION INTRAVENOUS at 22:49

## 2021-01-01 RX ADMIN — BUSPIRONE HYDROCHLORIDE 10 MG: 10 TABLET ORAL at 08:36

## 2021-01-01 RX ADMIN — Medication 10 MG/HR: at 18:12

## 2021-01-01 RX ADMIN — Medication 2000 UNITS: at 11:11

## 2021-01-01 RX ADMIN — HYDRALAZINE HYDROCHLORIDE 5 MG: 20 INJECTION INTRAMUSCULAR; INTRAVENOUS at 17:05

## 2021-01-01 RX ADMIN — DIPHENHYDRAMINE HYDROCHLORIDE 25 MG: 25 TABLET ORAL at 20:21

## 2021-01-01 RX ADMIN — ACETAMINOPHEN 650 MG: 325 TABLET ORAL at 09:44

## 2021-01-01 RX ADMIN — POLYETHYLENE GLYCOL 3350 17 G: 17 POWDER, FOR SOLUTION ORAL at 19:29

## 2021-01-01 RX ADMIN — Medication 2000 UNITS: at 08:02

## 2021-01-01 RX ADMIN — METOPROLOL TARTRATE 25 MG: 25 TABLET, FILM COATED ORAL at 21:26

## 2021-01-01 RX ADMIN — Medication 2000 UNITS: at 09:44

## 2021-01-01 RX ADMIN — OXYCODONE HYDROCHLORIDE AND ACETAMINOPHEN 1000 MG: 500 TABLET ORAL at 08:18

## 2021-01-01 RX ADMIN — ENOXAPARIN SODIUM 30 MG: 100 INJECTION SUBCUTANEOUS at 08:24

## 2021-01-01 RX ADMIN — INSULIN LISPRO 1 UNITS: 100 INJECTION, SOLUTION INTRAVENOUS; SUBCUTANEOUS at 07:44

## 2021-01-01 RX ADMIN — SODIUM CHLORIDE: 9 INJECTION, SOLUTION INTRAVENOUS at 06:37

## 2021-01-01 RX ADMIN — BUSPIRONE HYDROCHLORIDE 10 MG: 10 TABLET ORAL at 13:53

## 2021-01-01 RX ADMIN — SODIUM CHLORIDE, SODIUM LACTATE, POTASSIUM CHLORIDE, CALCIUM CHLORIDE AND DEXTROSE MONOHYDRATE: 5; 600; 310; 30; 20 INJECTION, SOLUTION INTRAVENOUS at 14:00

## 2021-01-01 RX ADMIN — DEXMEDETOMIDINE HYDROCHLORIDE 0.3 MCG/KG/HR: 4 INJECTION INTRAVENOUS at 23:30

## 2021-01-01 RX ADMIN — Medication: at 09:15

## 2021-01-01 RX ADMIN — DEXMEDETOMIDINE HYDROCHLORIDE 0.7 MCG/KG/HR: 4 INJECTION INTRAVENOUS at 02:46

## 2021-01-01 RX ADMIN — HEPARIN SODIUM AND DEXTROSE 18 UNITS/KG/HR: 10000; 5 INJECTION INTRAVENOUS at 23:15

## 2021-01-01 RX ADMIN — LORAZEPAM 1 MG: 2 INJECTION INTRAMUSCULAR; INTRAVENOUS at 06:24

## 2021-01-01 RX ADMIN — HEPARIN SODIUM AND DEXTROSE 14 UNITS/KG/HR: 10000; 5 INJECTION INTRAVENOUS at 04:33

## 2021-01-01 RX ADMIN — SODIUM CHLORIDE, PRESERVATIVE FREE 10 ML: 5 INJECTION INTRAVENOUS at 20:15

## 2021-01-01 RX ADMIN — OXYCODONE HYDROCHLORIDE AND ACETAMINOPHEN 1000 MG: 500 TABLET ORAL at 08:03

## 2021-01-01 RX ADMIN — FUROSEMIDE 20 MG: 10 INJECTION, SOLUTION INTRAMUSCULAR; INTRAVENOUS at 05:35

## 2021-01-01 RX ADMIN — CIPROFLOXACIN 400 MG: 2 INJECTION, SOLUTION INTRAVENOUS at 12:14

## 2021-01-01 RX ADMIN — DEXAMETHASONE SODIUM PHOSPHATE 20 MG: 4 INJECTION, SOLUTION INTRAMUSCULAR; INTRAVENOUS at 10:34

## 2021-01-01 RX ADMIN — METOPROLOL TARTRATE 50 MG: 50 TABLET, FILM COATED ORAL at 23:04

## 2021-01-01 RX ADMIN — METOPROLOL TARTRATE 50 MG: 50 TABLET, FILM COATED ORAL at 08:38

## 2021-01-01 RX ADMIN — METOPROLOL TARTRATE 50 MG: 50 TABLET, FILM COATED ORAL at 08:06

## 2021-01-01 RX ADMIN — FAMOTIDINE 20 MG: 20 TABLET ORAL at 08:38

## 2021-01-01 RX ADMIN — BUSPIRONE HYDROCHLORIDE 10 MG: 10 TABLET ORAL at 13:31

## 2021-01-01 RX ADMIN — AZITHROMYCIN DIHYDRATE 500 MG: 500 INJECTION, POWDER, LYOPHILIZED, FOR SOLUTION INTRAVENOUS at 11:58

## 2021-01-01 RX ADMIN — PROPOFOL 45 MCG/KG/MIN: 10 INJECTION, EMULSION INTRAVENOUS at 15:30

## 2021-01-01 RX ADMIN — GUAIFENESIN AND DEXTROMETHORPHAN 5 ML: 100; 10 SYRUP ORAL at 20:24

## 2021-01-01 RX ADMIN — Medication 8.8 MG: at 00:37

## 2021-01-01 RX ADMIN — FAMOTIDINE 20 MG: 10 INJECTION, SOLUTION INTRAVENOUS at 08:50

## 2021-01-01 RX ADMIN — BUSPIRONE HYDROCHLORIDE 10 MG: 10 TABLET ORAL at 20:39

## 2021-01-01 RX ADMIN — FAMOTIDINE 20 MG: 10 INJECTION, SOLUTION INTRAVENOUS at 20:14

## 2021-01-01 RX ADMIN — DEXAMETHASONE SODIUM PHOSPHATE 4 MG: 4 INJECTION, SOLUTION INTRA-ARTICULAR; INTRALESIONAL; INTRAMUSCULAR; INTRAVENOUS; SOFT TISSUE at 08:02

## 2021-01-01 RX ADMIN — FAMOTIDINE 20 MG: 20 TABLET ORAL at 08:59

## 2021-01-01 RX ADMIN — Medication 125 MCG/HR: at 21:46

## 2021-01-01 RX ADMIN — FENTANYL CITRATE 200 MCG/HR: 50 INJECTION INTRAMUSCULAR; INTRAVENOUS at 17:21

## 2021-01-01 RX ADMIN — Medication 50 MG: at 09:44

## 2021-01-01 RX ADMIN — PROPOFOL 25 MCG/KG/MIN: 10 INJECTION, EMULSION INTRAVENOUS at 12:12

## 2021-01-01 RX ADMIN — SODIUM CHLORIDE, SODIUM LACTATE, POTASSIUM CHLORIDE, CALCIUM CHLORIDE AND DEXTROSE MONOHYDRATE: 5; 600; 310; 30; 20 INJECTION, SOLUTION INTRAVENOUS at 08:01

## 2021-01-01 RX ADMIN — FAMOTIDINE 20 MG: 10 INJECTION, SOLUTION INTRAVENOUS at 08:35

## 2021-01-01 RX ADMIN — FENTANYL CITRATE 200 MCG/HR: 50 INJECTION INTRAMUSCULAR; INTRAVENOUS at 09:14

## 2021-01-01 RX ADMIN — FUROSEMIDE 40 MG: 10 INJECTION, SOLUTION INTRAMUSCULAR; INTRAVENOUS at 13:28

## 2021-01-01 RX ADMIN — BUSPIRONE HYDROCHLORIDE 10 MG: 10 TABLET ORAL at 08:37

## 2021-01-01 RX ADMIN — Medication 3 MG: at 20:24

## 2021-01-01 RX ADMIN — DEXAMETHASONE SODIUM PHOSPHATE 20 MG: 4 INJECTION, SOLUTION INTRAMUSCULAR; INTRAVENOUS at 11:20

## 2021-01-01 RX ADMIN — ENOXAPARIN SODIUM 80 MG: 100 INJECTION SUBCUTANEOUS at 14:07

## 2021-01-01 RX ADMIN — ENOXAPARIN SODIUM 105 MG: 150 INJECTION SUBCUTANEOUS at 10:09

## 2021-01-01 RX ADMIN — GENTAMICIN SULFATE 120 MG: 60 INJECTION, SOLUTION INTRAVENOUS at 08:44

## 2021-01-01 RX ADMIN — DEXAMETHASONE 10 MG: 4 TABLET ORAL at 09:44

## 2021-01-01 RX ADMIN — Medication 10 MG/HR: at 07:02

## 2021-01-01 RX ADMIN — BUSPIRONE HYDROCHLORIDE 10 MG: 10 TABLET ORAL at 21:59

## 2021-01-01 RX ADMIN — AZITHROMYCIN DIHYDRATE 500 MG: 500 INJECTION, POWDER, LYOPHILIZED, FOR SOLUTION INTRAVENOUS at 10:22

## 2021-01-01 RX ADMIN — KETAMINE HYDROCHLORIDE 0.2 MG/KG/HR: 50 INJECTION, SOLUTION INTRAMUSCULAR; INTRAVENOUS at 13:14

## 2021-01-01 RX ADMIN — PROPOFOL 45 MCG/KG/MIN: 10 INJECTION, EMULSION INTRAVENOUS at 05:13

## 2021-01-01 RX ADMIN — Medication 2000 UNITS: at 08:17

## 2021-01-01 RX ADMIN — DEXMEDETOMIDINE HYDROCHLORIDE 0.7 MCG/KG/HR: 4 INJECTION INTRAVENOUS at 00:29

## 2021-01-01 RX ADMIN — PHENYLEPHRINE HYDROCHLORIDE 250 MCG/MIN: 10 INJECTION INTRAVENOUS at 19:36

## 2021-01-01 RX ADMIN — FAMOTIDINE 20 MG: 20 TABLET ORAL at 20:25

## 2021-01-01 RX ADMIN — BUSPIRONE HYDROCHLORIDE 10 MG: 10 TABLET ORAL at 13:59

## 2021-01-01 RX ADMIN — PROPOFOL 40 MCG/KG/MIN: 10 INJECTION, EMULSION INTRAVENOUS at 09:12

## 2021-01-01 RX ADMIN — Medication 50 MG: at 08:50

## 2021-01-01 RX ADMIN — FENTANYL CITRATE 200 MCG/HR: 50 INJECTION INTRAMUSCULAR; INTRAVENOUS at 14:50

## 2021-01-01 RX ADMIN — FENTANYL CITRATE 200 MCG/HR: 50 INJECTION INTRAMUSCULAR; INTRAVENOUS at 04:35

## 2021-01-01 RX ADMIN — BUSPIRONE HYDROCHLORIDE 10 MG: 10 TABLET ORAL at 21:06

## 2021-01-01 RX ADMIN — AMIODARONE HYDROCHLORIDE 1 MG/MIN: 1.8 INJECTION, SOLUTION INTRAVENOUS at 08:23

## 2021-01-01 RX ADMIN — LORAZEPAM 1 MG: 2 INJECTION INTRAMUSCULAR; INTRAVENOUS at 04:08

## 2021-01-01 RX ADMIN — BUSPIRONE HYDROCHLORIDE 10 MG: 10 TABLET ORAL at 13:36

## 2021-01-01 RX ADMIN — ACETAMINOPHEN 650 MG: 325 TABLET ORAL at 15:04

## 2021-01-01 RX ADMIN — BUSPIRONE HYDROCHLORIDE 10 MG: 10 TABLET ORAL at 14:45

## 2021-01-01 RX ADMIN — FENTANYL CITRATE 200 MCG/HR: 50 INJECTION INTRAMUSCULAR; INTRAVENOUS at 05:07

## 2021-01-01 RX ADMIN — ENOXAPARIN SODIUM 105 MG: 150 INJECTION SUBCUTANEOUS at 20:35

## 2021-01-01 RX ADMIN — Medication 2000 UNITS: at 09:21

## 2021-01-01 RX ADMIN — BUSPIRONE HYDROCHLORIDE 10 MG: 10 TABLET ORAL at 08:17

## 2021-01-01 RX ADMIN — DEXAMETHASONE SODIUM PHOSPHATE 4 MG: 4 INJECTION, SOLUTION INTRA-ARTICULAR; INTRALESIONAL; INTRAMUSCULAR; INTRAVENOUS; SOFT TISSUE at 03:47

## 2021-01-01 RX ADMIN — LORAZEPAM 1 MG: 2 INJECTION INTRAMUSCULAR; INTRAVENOUS at 06:34

## 2021-01-01 RX ADMIN — Medication 8.8 MG: at 08:22

## 2021-01-01 RX ADMIN — CEFEPIME HYDROCHLORIDE 2000 MG: 2 INJECTION, POWDER, FOR SOLUTION INTRAVENOUS at 16:43

## 2021-01-01 RX ADMIN — GUAIFENESIN AND DEXTROMETHORPHAN 5 ML: 100; 10 SYRUP ORAL at 21:25

## 2021-01-01 RX ADMIN — SODIUM CHLORIDE: 9 INJECTION, SOLUTION INTRAVENOUS at 12:15

## 2021-01-01 RX ADMIN — FENTANYL CITRATE 200 MCG/HR: 50 INJECTION INTRAMUSCULAR; INTRAVENOUS at 03:20

## 2021-01-01 RX ADMIN — ACETAMINOPHEN 650 MG: 325 TABLET ORAL at 20:40

## 2021-01-01 RX ADMIN — Medication 50 MG: at 08:51

## 2021-01-01 RX ADMIN — DIPHENHYDRAMINE HYDROCHLORIDE 25 MG: 25 TABLET ORAL at 20:57

## 2021-01-01 RX ADMIN — FENTANYL CITRATE 200 MCG/HR: 50 INJECTION INTRAMUSCULAR; INTRAVENOUS at 20:49

## 2021-01-01 RX ADMIN — ACETAMINOPHEN 650 MG: 325 TABLET ORAL at 12:52

## 2021-01-01 RX ADMIN — ENOXAPARIN SODIUM 30 MG: 100 INJECTION SUBCUTANEOUS at 08:59

## 2021-01-01 RX ADMIN — Medication 2000 UNITS: at 09:32

## 2021-01-01 RX ADMIN — FAMOTIDINE 20 MG: 20 TABLET ORAL at 14:43

## 2021-01-01 RX ADMIN — IOPAMIDOL 80 ML: 755 INJECTION, SOLUTION INTRAVENOUS at 16:14

## 2021-01-01 RX ADMIN — BUMETANIDE 2 MG: 0.25 INJECTION, SOLUTION INTRAMUSCULAR; INTRAVENOUS at 20:27

## 2021-01-01 RX ADMIN — FENTANYL CITRATE 175 MCG/HR: 50 INJECTION INTRAMUSCULAR; INTRAVENOUS at 06:59

## 2021-01-01 RX ADMIN — FENTANYL CITRATE 125 MCG/HR: 50 INJECTION INTRAMUSCULAR; INTRAVENOUS at 18:23

## 2021-01-01 RX ADMIN — ACETAMINOPHEN 650 MG: 325 TABLET ORAL at 15:11

## 2021-01-01 RX ADMIN — ENOXAPARIN SODIUM 105 MG: 150 INJECTION SUBCUTANEOUS at 08:33

## 2021-01-01 RX ADMIN — DEXAMETHASONE SODIUM PHOSPHATE 4 MG: 4 INJECTION, SOLUTION INTRA-ARTICULAR; INTRALESIONAL; INTRAMUSCULAR; INTRAVENOUS; SOFT TISSUE at 14:22

## 2021-01-01 RX ADMIN — FENTANYL CITRATE 200 MCG/HR: 50 INJECTION INTRAMUSCULAR; INTRAVENOUS at 19:26

## 2021-01-01 RX ADMIN — DEXMEDETOMIDINE HYDROCHLORIDE 0.8 MCG/KG/HR: 4 INJECTION INTRAVENOUS at 16:23

## 2021-01-01 RX ADMIN — ENOXAPARIN SODIUM 105 MG: 150 INJECTION SUBCUTANEOUS at 21:56

## 2021-01-01 RX ADMIN — Medication 50 MG: at 08:09

## 2021-01-01 RX ADMIN — CIPROFLOXACIN 400 MG: 2 INJECTION, SOLUTION INTRAVENOUS at 00:24

## 2021-01-01 RX ADMIN — ENOXAPARIN SODIUM 105 MG: 150 INJECTION SUBCUTANEOUS at 08:04

## 2021-01-01 RX ADMIN — Medication 2000 UNITS: at 08:05

## 2021-01-01 RX ADMIN — ALBUMIN (HUMAN) 25 G: 0.25 INJECTION, SOLUTION INTRAVENOUS at 13:37

## 2021-01-01 RX ADMIN — BUSPIRONE HYDROCHLORIDE 10 MG: 10 TABLET ORAL at 21:57

## 2021-01-01 RX ADMIN — DEXMEDETOMIDINE HYDROCHLORIDE 0.7 MCG/KG/HR: 4 INJECTION INTRAVENOUS at 18:14

## 2021-01-01 RX ADMIN — METOPROLOL TARTRATE 50 MG: 50 TABLET, FILM COATED ORAL at 19:55

## 2021-01-01 RX ADMIN — FAMOTIDINE 20 MG: 10 INJECTION, SOLUTION INTRAVENOUS at 20:24

## 2021-01-01 RX ADMIN — LOSARTAN POTASSIUM 25 MG: 25 TABLET, FILM COATED ORAL at 09:44

## 2021-01-01 RX ADMIN — Medication 50 MG: at 08:05

## 2021-01-01 RX ADMIN — Medication 1 MG/HR: at 12:30

## 2021-01-01 RX ADMIN — FENTANYL CITRATE 175 MCG/HR: 50 INJECTION INTRAMUSCULAR; INTRAVENOUS at 07:34

## 2021-01-01 RX ADMIN — SODIUM CHLORIDE, PRESERVATIVE FREE 10 ML: 5 INJECTION INTRAVENOUS at 08:19

## 2021-01-01 RX ADMIN — BUSPIRONE HYDROCHLORIDE 10 MG: 10 TABLET ORAL at 22:15

## 2021-01-01 RX ADMIN — Medication 10 MG/HR: at 20:20

## 2021-01-01 RX ADMIN — BUMETANIDE 0.5 MG/HR: 0.25 INJECTION, SOLUTION INTRAMUSCULAR; INTRAVENOUS at 20:50

## 2021-01-01 RX ADMIN — PROPOFOL 20 MCG/KG/MIN: 10 INJECTION, EMULSION INTRAVENOUS at 08:43

## 2021-01-01 RX ADMIN — CIPROFLOXACIN 400 MG: 2 INJECTION, SOLUTION INTRAVENOUS at 23:08

## 2021-01-01 RX ADMIN — ACETAMINOPHEN 650 MG: 325 TABLET ORAL at 20:34

## 2021-01-01 RX ADMIN — SODIUM CHLORIDE, PRESERVATIVE FREE 10 ML: 5 INJECTION INTRAVENOUS at 08:04

## 2021-01-01 RX ADMIN — LISINOPRIL 10 MG: 10 TABLET ORAL at 18:01

## 2021-01-01 RX ADMIN — SODIUM CHLORIDE: 9 INJECTION, SOLUTION INTRAVENOUS at 00:28

## 2021-01-01 RX ADMIN — BUSPIRONE HYDROCHLORIDE 10 MG: 10 TABLET ORAL at 20:25

## 2021-01-01 RX ADMIN — BUSPIRONE HYDROCHLORIDE 10 MG: 10 TABLET ORAL at 15:00

## 2021-01-01 RX ADMIN — Medication 50 MG: at 08:00

## 2021-01-01 RX ADMIN — BISACODYL 10 MG: 10 SUPPOSITORY RECTAL at 08:16

## 2021-01-01 RX ADMIN — ACETAMINOPHEN 650 MG: 325 TABLET ORAL at 03:11

## 2021-01-01 RX ADMIN — AMIODARONE HYDROCHLORIDE 0.5 MG/MIN: 1.8 INJECTION, SOLUTION INTRAVENOUS at 03:32

## 2021-01-01 RX ADMIN — OXYCODONE HYDROCHLORIDE AND ACETAMINOPHEN 1000 MG: 500 TABLET ORAL at 10:06

## 2021-01-01 RX ADMIN — PHENYLEPHRINE HYDROCHLORIDE 200 MCG/MIN: 10 INJECTION INTRAVENOUS at 15:21

## 2021-01-01 RX ADMIN — POLYETHYLENE GLYCOL 3350 17 G: 17 POWDER, FOR SOLUTION ORAL at 08:34

## 2021-01-01 RX ADMIN — FAMOTIDINE 20 MG: 20 TABLET ORAL at 19:56

## 2021-01-01 RX ADMIN — Medication 10 MG/HR: at 02:34

## 2021-01-01 RX ADMIN — ETOMIDATE 20 MG: 2 INJECTION INTRAVENOUS at 05:50

## 2021-01-01 RX ADMIN — DEXAMETHASONE 6 MG: 4 TABLET ORAL at 08:09

## 2021-01-01 RX ADMIN — FENTANYL CITRATE 200 MCG/HR: 50 INJECTION INTRAMUSCULAR; INTRAVENOUS at 12:02

## 2021-01-01 RX ADMIN — METOPROLOL TARTRATE 50 MG: 50 TABLET, FILM COATED ORAL at 21:06

## 2021-01-01 RX ADMIN — Medication 2000 UNITS: at 08:50

## 2021-01-01 RX ADMIN — LORAZEPAM 1 MG: 2 INJECTION INTRAMUSCULAR; INTRAVENOUS at 14:57

## 2021-01-01 RX ADMIN — DEXAMETHASONE SODIUM PHOSPHATE 10 MG: 4 INJECTION, SOLUTION INTRA-ARTICULAR; INTRALESIONAL; INTRAMUSCULAR; INTRAVENOUS; SOFT TISSUE at 09:17

## 2021-01-01 RX ADMIN — ENOXAPARIN SODIUM 30 MG: 100 INJECTION SUBCUTANEOUS at 21:25

## 2021-01-01 RX ADMIN — FAMOTIDINE 20 MG: 10 INJECTION, SOLUTION INTRAVENOUS at 20:49

## 2021-01-01 RX ADMIN — SODIUM CHLORIDE, SODIUM LACTATE, POTASSIUM CHLORIDE, CALCIUM CHLORIDE AND DEXTROSE MONOHYDRATE: 5; 600; 310; 30; 20 INJECTION, SOLUTION INTRAVENOUS at 18:11

## 2021-01-01 RX ADMIN — BUSPIRONE HYDROCHLORIDE 10 MG: 10 TABLET ORAL at 20:24

## 2021-01-01 RX ADMIN — DOPAMINE HYDROCHLORIDE IN DEXTROSE 2 MCG/KG/MIN: 1.6 INJECTION, SOLUTION INTRAVENOUS at 11:01

## 2021-01-01 RX ADMIN — Medication 50 MG: at 08:18

## 2021-01-01 RX ADMIN — MIDAZOLAM 4 MG: 1 INJECTION INTRAMUSCULAR; INTRAVENOUS at 05:50

## 2021-01-01 RX ADMIN — DEXMEDETOMIDINE HYDROCHLORIDE 0.2 MCG/KG/HR: 4 INJECTION INTRAVENOUS at 18:26

## 2021-01-01 RX ADMIN — POLYETHYLENE GLYCOL 3350 17 G: 17 POWDER, FOR SOLUTION ORAL at 13:44

## 2021-01-01 RX ADMIN — FENTANYL CITRATE 175 MCG/HR: 50 INJECTION INTRAMUSCULAR; INTRAVENOUS at 22:36

## 2021-01-01 RX ADMIN — PROPOFOL 45.08 MCG/KG/MIN: 10 INJECTION, EMULSION INTRAVENOUS at 08:53

## 2021-01-01 RX ADMIN — Medication 3 MG: at 00:44

## 2021-01-01 RX ADMIN — SODIUM CHLORIDE: 9 INJECTION, SOLUTION INTRAVENOUS at 20:45

## 2021-01-01 RX ADMIN — CIPROFLOXACIN 400 MG: 2 INJECTION, SOLUTION INTRAVENOUS at 23:41

## 2021-01-01 RX ADMIN — FENTANYL CITRATE 150 MCG/HR: 50 INJECTION INTRAMUSCULAR; INTRAVENOUS at 10:03

## 2021-01-01 RX ADMIN — Medication 4.5 MG: at 20:39

## 2021-01-01 RX ADMIN — DEXMEDETOMIDINE HYDROCHLORIDE 0.8 MCG/KG/HR: 4 INJECTION INTRAVENOUS at 21:38

## 2021-01-01 RX ADMIN — CIPROFLOXACIN 400 MG: 2 INJECTION, SOLUTION INTRAVENOUS at 12:36

## 2021-01-01 RX ADMIN — BUSPIRONE HYDROCHLORIDE 10 MG: 10 TABLET ORAL at 14:43

## 2021-01-01 RX ADMIN — GENTAMICIN SULFATE 120 MG: 60 INJECTION, SOLUTION INTRAVENOUS at 01:23

## 2021-01-01 RX ADMIN — CIPROFLOXACIN 400 MG: 2 INJECTION, SOLUTION INTRAVENOUS at 12:09

## 2021-01-01 RX ADMIN — OXYCODONE HYDROCHLORIDE AND ACETAMINOPHEN 1000 MG: 500 TABLET ORAL at 11:11

## 2021-01-01 RX ADMIN — DEXAMETHASONE SODIUM PHOSPHATE 20 MG: 4 INJECTION, SOLUTION INTRAMUSCULAR; INTRAVENOUS at 08:03

## 2021-01-01 RX ADMIN — DIPHENHYDRAMINE HYDROCHLORIDE 25 MG: 25 TABLET ORAL at 20:34

## 2021-01-01 RX ADMIN — FENTANYL CITRATE 200 MCG/HR: 50 INJECTION INTRAMUSCULAR; INTRAVENOUS at 23:39

## 2021-01-01 RX ADMIN — FENTANYL CITRATE 200 MCG/HR: 50 INJECTION INTRAMUSCULAR; INTRAVENOUS at 18:01

## 2021-01-01 RX ADMIN — BUSPIRONE HYDROCHLORIDE 10 MG: 10 TABLET ORAL at 13:10

## 2021-01-01 RX ADMIN — BUSPIRONE HYDROCHLORIDE 10 MG: 10 TABLET ORAL at 19:29

## 2021-01-01 RX ADMIN — AMIODARONE HYDROCHLORIDE 150 MG: 1.5 INJECTION, SOLUTION INTRAVENOUS at 08:15

## 2021-01-01 RX ADMIN — BUSPIRONE HYDROCHLORIDE 10 MG: 10 TABLET ORAL at 20:22

## 2021-01-01 RX ADMIN — LORAZEPAM 1 MG: 2 INJECTION INTRAMUSCULAR; INTRAVENOUS at 15:12

## 2021-01-01 RX ADMIN — PHENYLEPHRINE HYDROCHLORIDE 225 MCG/MIN: 10 INJECTION INTRAVENOUS at 19:54

## 2021-01-01 RX ADMIN — LORAZEPAM 1 MG: 2 INJECTION INTRAMUSCULAR; INTRAVENOUS at 18:21

## 2021-01-01 RX ADMIN — Medication 5 MG/HR: at 12:40

## 2021-01-01 RX ADMIN — PHENYLEPHRINE HYDROCHLORIDE 175 MCG/MIN: 10 INJECTION INTRAVENOUS at 08:31

## 2021-01-01 RX ADMIN — ENOXAPARIN SODIUM 105 MG: 150 INJECTION SUBCUTANEOUS at 08:06

## 2021-01-01 RX ADMIN — METOPROLOL TARTRATE 50 MG: 50 TABLET, FILM COATED ORAL at 08:59

## 2021-01-01 RX ADMIN — Medication 50 MG: at 09:35

## 2021-01-01 RX ADMIN — Medication 50 MG: at 08:17

## 2021-01-01 RX ADMIN — GUAIFENESIN AND DEXTROMETHORPHAN 5 ML: 100; 10 SYRUP ORAL at 05:17

## 2021-01-01 RX ADMIN — METOPROLOL TARTRATE 25 MG: 25 TABLET, FILM COATED ORAL at 20:39

## 2021-01-01 RX ADMIN — LOSARTAN POTASSIUM 25 MG: 25 TABLET, FILM COATED ORAL at 08:50

## 2021-01-01 RX ADMIN — BARICITINIB 4 MG: 2 TABLET, FILM COATED ORAL at 08:59

## 2021-01-01 RX ADMIN — CISATRACURIUM BESYLATE 2 MCG/KG/MIN: 10 INJECTION, SOLUTION INTRAVENOUS at 09:05

## 2021-01-01 RX ADMIN — KETAMINE HYDROCHLORIDE 1.1 MG/KG/HR: 50 INJECTION, SOLUTION INTRAMUSCULAR; INTRAVENOUS at 15:30

## 2021-01-01 RX ADMIN — DOPAMINE HYDROCHLORIDE IN DEXTROSE 1.5 MCG/KG/MIN: 1.6 INJECTION, SOLUTION INTRAVENOUS at 16:39

## 2021-01-01 RX ADMIN — ENOXAPARIN SODIUM 30 MG: 100 INJECTION SUBCUTANEOUS at 19:56

## 2021-01-01 RX ADMIN — ENOXAPARIN SODIUM 105 MG: 150 INJECTION SUBCUTANEOUS at 20:40

## 2021-01-01 RX ADMIN — SODIUM CHLORIDE, PRESERVATIVE FREE 10 ML: 5 INJECTION INTRAVENOUS at 08:35

## 2021-01-01 RX ADMIN — Medication 50 MG: at 08:35

## 2021-01-01 RX ADMIN — ENOXAPARIN SODIUM 105 MG: 150 INJECTION SUBCUTANEOUS at 09:45

## 2021-01-01 RX ADMIN — Medication 50 MG: at 08:27

## 2021-01-01 RX ADMIN — Medication 125 MCG/HR: at 01:55

## 2021-01-01 RX ADMIN — HEPARIN SODIUM 8750 UNITS: 1000 INJECTION, SOLUTION INTRAVENOUS; SUBCUTANEOUS at 23:16

## 2021-01-01 RX ADMIN — Medication 10 MG/HR: at 21:22

## 2021-01-01 RX ADMIN — LORAZEPAM 1 MG: 2 INJECTION INTRAMUSCULAR; INTRAVENOUS at 04:39

## 2021-01-01 RX ADMIN — SODIUM CHLORIDE, PRESERVATIVE FREE 10 ML: 5 INJECTION INTRAVENOUS at 09:13

## 2021-01-01 RX ADMIN — FAMOTIDINE 20 MG: 10 INJECTION, SOLUTION INTRAVENOUS at 21:07

## 2021-01-01 RX ADMIN — CISATRACURIUM BESYLATE 2 MCG/KG/MIN: 10 INJECTION, SOLUTION INTRAVENOUS at 22:07

## 2021-01-01 RX ADMIN — FAMOTIDINE 20 MG: 20 TABLET ORAL at 20:34

## 2021-01-01 RX ADMIN — Medication 10 MG/HR: at 04:32

## 2021-01-01 RX ADMIN — METOPROLOL TARTRATE 50 MG: 50 TABLET, FILM COATED ORAL at 20:57

## 2021-01-01 RX ADMIN — LOSARTAN POTASSIUM 25 MG: 25 TABLET, FILM COATED ORAL at 08:10

## 2021-01-01 RX ADMIN — BUSPIRONE HYDROCHLORIDE 10 MG: 10 TABLET ORAL at 13:41

## 2021-01-01 RX ADMIN — DIPHENHYDRAMINE HYDROCHLORIDE 25 MG: 25 TABLET ORAL at 00:24

## 2021-01-01 RX ADMIN — SODIUM CHLORIDE, PRESERVATIVE FREE 10 ML: 5 INJECTION INTRAVENOUS at 08:09

## 2021-01-01 RX ADMIN — BUSPIRONE HYDROCHLORIDE 10 MG: 10 TABLET ORAL at 08:27

## 2021-01-01 RX ADMIN — BUSPIRONE HYDROCHLORIDE 10 MG: 10 TABLET ORAL at 14:16

## 2021-01-01 RX ADMIN — ENOXAPARIN SODIUM 105 MG: 150 INJECTION SUBCUTANEOUS at 08:21

## 2021-01-01 RX ADMIN — BISACODYL 10 MG: 10 SUPPOSITORY RECTAL at 08:27

## 2021-01-01 RX ADMIN — ENOXAPARIN SODIUM 105 MG: 150 INJECTION SUBCUTANEOUS at 09:10

## 2021-01-01 RX ADMIN — FAMOTIDINE 20 MG: 10 INJECTION, SOLUTION INTRAVENOUS at 07:56

## 2021-01-01 RX ADMIN — BENZONATATE 100 MG: 100 CAPSULE ORAL at 09:44

## 2021-01-01 RX ADMIN — OXYCODONE HYDROCHLORIDE AND ACETAMINOPHEN 1000 MG: 500 TABLET ORAL at 09:21

## 2021-01-01 RX ADMIN — Medication 10 MG/HR: at 16:24

## 2021-01-01 RX ADMIN — SODIUM CHLORIDE, SODIUM LACTATE, POTASSIUM CHLORIDE, CALCIUM CHLORIDE AND DEXTROSE MONOHYDRATE: 5; 600; 310; 30; 20 INJECTION, SOLUTION INTRAVENOUS at 23:20

## 2021-01-01 RX ADMIN — LORAZEPAM 1 MG: 2 INJECTION INTRAMUSCULAR; INTRAVENOUS at 11:20

## 2021-01-01 RX ADMIN — Medication 2000 UNITS: at 14:58

## 2021-01-01 RX ADMIN — FENTANYL CITRATE 200 MCG/HR: 50 INJECTION INTRAMUSCULAR; INTRAVENOUS at 01:53

## 2021-01-01 RX ADMIN — AMIODARONE HYDROCHLORIDE 0.5 MG/MIN: 1.8 INJECTION, SOLUTION INTRAVENOUS at 14:22

## 2021-01-01 RX ADMIN — DEXAMETHASONE SODIUM PHOSPHATE 6 MG: 4 INJECTION, SOLUTION INTRA-ARTICULAR; INTRALESIONAL; INTRAMUSCULAR; INTRAVENOUS; SOFT TISSUE at 17:50

## 2021-01-01 RX ADMIN — Medication 2000 UNITS: at 17:50

## 2021-01-01 RX ADMIN — FAMOTIDINE 20 MG: 10 INJECTION, SOLUTION INTRAVENOUS at 23:19

## 2021-01-01 RX ADMIN — FENTANYL CITRATE 200 MCG/HR: 50 INJECTION INTRAMUSCULAR; INTRAVENOUS at 10:59

## 2021-01-01 RX ADMIN — DEXMEDETOMIDINE HYDROCHLORIDE 0.8 MCG/KG/HR: 4 INJECTION INTRAVENOUS at 01:24

## 2021-01-01 RX ADMIN — Medication 50 MG: at 14:58

## 2021-01-01 RX ADMIN — Medication 100 MG: at 08:22

## 2021-01-01 RX ADMIN — BUSPIRONE HYDROCHLORIDE 10 MG: 10 TABLET ORAL at 22:09

## 2021-01-01 RX ADMIN — FAMOTIDINE 20 MG: 10 INJECTION, SOLUTION INTRAVENOUS at 21:28

## 2021-01-01 RX ADMIN — BUSPIRONE HYDROCHLORIDE 10 MG: 10 TABLET ORAL at 10:06

## 2021-01-01 RX ADMIN — Medication 10 MG/HR: at 14:50

## 2021-01-01 RX ADMIN — ENOXAPARIN SODIUM 30 MG: 100 INJECTION SUBCUTANEOUS at 08:18

## 2021-01-01 RX ADMIN — DEXMEDETOMIDINE HYDROCHLORIDE 0.4 MCG/KG/HR: 4 INJECTION INTRAVENOUS at 09:27

## 2021-01-01 RX ADMIN — Medication 50 MG: at 08:59

## 2021-01-01 RX ADMIN — Medication 3 MG: at 22:45

## 2021-01-01 RX ADMIN — Medication 10 MG/HR: at 12:45

## 2021-01-01 RX ADMIN — FENTANYL CITRATE 200 MCG/HR: 50 INJECTION INTRAMUSCULAR; INTRAVENOUS at 16:25

## 2021-01-01 RX ADMIN — FENTANYL CITRATE 200 MCG/HR: 50 INJECTION INTRAMUSCULAR; INTRAVENOUS at 22:13

## 2021-01-01 RX ADMIN — SODIUM CHLORIDE, PRESERVATIVE FREE 10 ML: 5 INJECTION INTRAVENOUS at 20:08

## 2021-01-01 RX ADMIN — ACETAMINOPHEN 650 MG: 325 TABLET ORAL at 08:03

## 2021-01-01 RX ADMIN — METOPROLOL TARTRATE 50 MG: 50 TABLET, FILM COATED ORAL at 20:49

## 2021-01-01 RX ADMIN — LORAZEPAM 1 MG: 2 INJECTION INTRAMUSCULAR; INTRAVENOUS at 00:42

## 2021-01-01 RX ADMIN — Medication 10 MG/HR: at 07:53

## 2021-01-01 RX ADMIN — FENTANYL CITRATE 200 MCG/HR: 50 INJECTION, SOLUTION INTRAMUSCULAR; INTRAVENOUS at 16:37

## 2021-01-01 RX ADMIN — PROPOFOL 25 MCG/KG/MIN: 10 INJECTION, EMULSION INTRAVENOUS at 00:57

## 2021-01-01 RX ADMIN — FENTANYL CITRATE 175 MCG/HR: 50 INJECTION INTRAMUSCULAR; INTRAVENOUS at 01:01

## 2021-01-01 RX ADMIN — DEXAMETHASONE SODIUM PHOSPHATE 6 MG: 4 INJECTION, SOLUTION INTRA-ARTICULAR; INTRALESIONAL; INTRAMUSCULAR; INTRAVENOUS; SOFT TISSUE at 17:45

## 2021-01-01 RX ADMIN — CEFTRIAXONE SODIUM 1000 MG: 1 INJECTION, POWDER, FOR SOLUTION INTRAMUSCULAR; INTRAVENOUS at 10:40

## 2021-01-01 RX ADMIN — Medication 8 MG/HR: at 16:14

## 2021-01-01 RX ADMIN — KETAMINE HYDROCHLORIDE 0.8 MG/KG/HR: 50 INJECTION, SOLUTION INTRAMUSCULAR; INTRAVENOUS at 01:43

## 2021-01-01 RX ADMIN — DEXMEDETOMIDINE HYDROCHLORIDE 0.4 MCG/KG/HR: 4 INJECTION INTRAVENOUS at 14:36

## 2021-01-01 RX ADMIN — CISATRACURIUM BESYLATE 3 MCG/KG/MIN: 10 INJECTION, SOLUTION INTRAVENOUS at 03:58

## 2021-01-01 RX ADMIN — LISINOPRIL 20 MG: 20 TABLET ORAL at 08:37

## 2021-01-01 RX ADMIN — Medication 8.8 MG: at 08:34

## 2021-01-01 RX ADMIN — FENTANYL CITRATE 100 MCG/HR: 50 INJECTION INTRAMUSCULAR; INTRAVENOUS at 07:14

## 2021-01-01 RX ADMIN — SODIUM CHLORIDE, PRESERVATIVE FREE 10 ML: 5 INJECTION INTRAVENOUS at 20:49

## 2021-01-01 RX ADMIN — CIPROFLOXACIN 400 MG: 2 INJECTION, SOLUTION INTRAVENOUS at 23:25

## 2021-01-01 RX ADMIN — DEXAMETHASONE SODIUM PHOSPHATE 10 MG: 4 INJECTION, SOLUTION INTRAMUSCULAR; INTRAVENOUS at 09:01

## 2021-01-01 RX ADMIN — DEXAMETHASONE SODIUM PHOSPHATE 10 MG: 4 INJECTION, SOLUTION INTRA-ARTICULAR; INTRALESIONAL; INTRAMUSCULAR; INTRAVENOUS; SOFT TISSUE at 08:10

## 2021-01-01 RX ADMIN — ONDANSETRON 4 MG: 2 INJECTION INTRAMUSCULAR; INTRAVENOUS at 10:49

## 2021-01-01 RX ADMIN — SODIUM CHLORIDE 500 ML: 9 INJECTION, SOLUTION INTRAVENOUS at 07:57

## 2021-01-01 RX ADMIN — Medication 10 MG/HR: at 14:11

## 2021-01-01 RX ADMIN — LORAZEPAM 1 MG: 2 INJECTION INTRAMUSCULAR; INTRAVENOUS at 16:02

## 2021-01-01 RX ADMIN — FUROSEMIDE 20 MG: 10 INJECTION, SOLUTION INTRAMUSCULAR; INTRAVENOUS at 08:05

## 2021-01-01 RX ADMIN — FENTANYL CITRATE 175 MCG/HR: 50 INJECTION INTRAMUSCULAR; INTRAVENOUS at 14:15

## 2021-01-01 RX ADMIN — Medication 100 MG: at 21:07

## 2021-01-01 RX ADMIN — ENOXAPARIN SODIUM 105 MG: 150 INJECTION SUBCUTANEOUS at 19:29

## 2021-01-01 RX ADMIN — BUSPIRONE HYDROCHLORIDE 10 MG: 10 TABLET ORAL at 21:03

## 2021-01-01 RX ADMIN — Medication 50 MG: at 17:50

## 2021-01-01 RX ADMIN — Medication 2000 UNITS: at 10:09

## 2021-01-01 RX ADMIN — PHENYLEPHRINE HYDROCHLORIDE 225 MCG/MIN: 10 INJECTION INTRAVENOUS at 04:39

## 2021-01-01 RX ADMIN — Medication 2000 UNITS: at 08:59

## 2021-01-01 RX ADMIN — SODIUM CHLORIDE: 9 INJECTION, SOLUTION INTRAVENOUS at 02:09

## 2021-01-01 RX ADMIN — METOPROLOL TARTRATE 50 MG: 50 TABLET, FILM COATED ORAL at 00:26

## 2021-01-01 RX ADMIN — OXYCODONE HYDROCHLORIDE AND ACETAMINOPHEN 1000 MG: 500 TABLET ORAL at 14:57

## 2021-01-01 RX ADMIN — Medication 50 MG: at 08:03

## 2021-01-01 RX ADMIN — FAMOTIDINE 20 MG: 10 INJECTION, SOLUTION INTRAVENOUS at 22:00

## 2021-01-01 RX ADMIN — CEFTRIAXONE SODIUM 1000 MG: 1 INJECTION, POWDER, FOR SOLUTION INTRAMUSCULAR; INTRAVENOUS at 10:36

## 2021-01-01 RX ADMIN — Medication 10 MG/HR: at 03:55

## 2021-01-01 RX ADMIN — METOPROLOL TARTRATE 50 MG: 50 TABLET, FILM COATED ORAL at 22:00

## 2021-01-01 RX ADMIN — ENOXAPARIN SODIUM 105 MG: 150 INJECTION SUBCUTANEOUS at 20:37

## 2021-01-01 RX ADMIN — METOPROLOL TARTRATE 50 MG: 50 TABLET, FILM COATED ORAL at 08:10

## 2021-01-01 RX ADMIN — LORAZEPAM 1 MG: 2 INJECTION INTRAMUSCULAR; INTRAVENOUS at 20:40

## 2021-01-01 RX ADMIN — CISATRACURIUM BESYLATE 3 MCG/KG/MIN: 10 INJECTION, SOLUTION INTRAVENOUS at 02:28

## 2021-01-01 RX ADMIN — BUSPIRONE HYDROCHLORIDE 10 MG: 10 TABLET ORAL at 11:11

## 2021-01-01 RX ADMIN — LORAZEPAM 1 MG: 2 INJECTION INTRAMUSCULAR; INTRAVENOUS at 15:48

## 2021-01-01 RX ADMIN — DEXAMETHASONE SODIUM PHOSPHATE 10 MG: 4 INJECTION, SOLUTION INTRA-ARTICULAR; INTRALESIONAL; INTRAMUSCULAR; INTRAVENOUS; SOFT TISSUE at 10:11

## 2021-01-01 RX ADMIN — AZITHROMYCIN DIHYDRATE 500 MG: 500 INJECTION, POWDER, LYOPHILIZED, FOR SOLUTION INTRAVENOUS at 11:04

## 2021-01-01 RX ADMIN — BENZONATATE 100 MG: 100 CAPSULE ORAL at 00:26

## 2021-01-01 RX ADMIN — METOPROLOL TARTRATE 50 MG: 50 TABLET, FILM COATED ORAL at 11:12

## 2021-01-01 RX ADMIN — PHENYLEPHRINE HYDROCHLORIDE 200 MCG/MIN: 10 INJECTION INTRAVENOUS at 08:58

## 2021-01-01 RX ADMIN — Medication 10 MG/HR: at 07:50

## 2021-01-01 RX ADMIN — SODIUM CHLORIDE 500 ML: 9 INJECTION, SOLUTION INTRAVENOUS at 17:09

## 2021-01-01 RX ADMIN — BUSPIRONE HYDROCHLORIDE 10 MG: 10 TABLET ORAL at 21:28

## 2021-01-01 RX ADMIN — CIPROFLOXACIN 400 MG: 2 INJECTION, SOLUTION INTRAVENOUS at 11:37

## 2021-01-01 RX ADMIN — PHENYLEPHRINE HYDROCHLORIDE 30 MCG/MIN: 10 INJECTION INTRAVENOUS at 09:11

## 2021-01-01 RX ADMIN — DEXAMETHASONE SODIUM PHOSPHATE 4 MG: 4 INJECTION, SOLUTION INTRA-ARTICULAR; INTRALESIONAL; INTRAMUSCULAR; INTRAVENOUS; SOFT TISSUE at 15:19

## 2021-01-01 RX ADMIN — BUSPIRONE HYDROCHLORIDE 10 MG: 10 TABLET ORAL at 14:47

## 2021-01-01 RX ADMIN — FAMOTIDINE 20 MG: 10 INJECTION, SOLUTION INTRAVENOUS at 08:28

## 2021-01-01 RX ADMIN — SODIUM CHLORIDE, PRESERVATIVE FREE 10 ML: 5 INJECTION INTRAVENOUS at 08:50

## 2021-01-01 RX ADMIN — FAMOTIDINE 20 MG: 20 TABLET ORAL at 08:18

## 2021-01-01 RX ADMIN — LORAZEPAM 1 MG: 2 INJECTION INTRAMUSCULAR; INTRAVENOUS at 08:49

## 2021-01-01 RX ADMIN — CISATRACURIUM BESYLATE 2.5 MCG/KG/MIN: 10 INJECTION, SOLUTION INTRAVENOUS at 15:28

## 2021-01-01 RX ADMIN — PROPOFOL 25 MCG/KG/MIN: 10 INJECTION, EMULSION INTRAVENOUS at 00:25

## 2021-01-01 RX ADMIN — FENTANYL CITRATE 200 MCG/HR: 50 INJECTION INTRAMUSCULAR; INTRAVENOUS at 10:48

## 2021-01-01 RX ADMIN — PHENYLEPHRINE HYDROCHLORIDE 250 MCG/MIN: 10 INJECTION INTRAVENOUS at 23:27

## 2021-01-01 RX ADMIN — KETAMINE HYDROCHLORIDE 1.1 MG/KG/HR: 50 INJECTION, SOLUTION INTRAMUSCULAR; INTRAVENOUS at 16:44

## 2021-01-01 RX ADMIN — SODIUM CHLORIDE, PRESERVATIVE FREE 10 ML: 5 INJECTION INTRAVENOUS at 22:01

## 2021-01-01 RX ADMIN — PROPOFOL 45 MCG/KG/MIN: 10 INJECTION, EMULSION INTRAVENOUS at 19:03

## 2021-01-01 RX ADMIN — PHENYLEPHRINE HYDROCHLORIDE 225 MCG/MIN: 10 INJECTION INTRAVENOUS at 00:00

## 2021-01-01 RX ADMIN — FAMOTIDINE 20 MG: 10 INJECTION, SOLUTION INTRAVENOUS at 09:38

## 2021-01-01 RX ADMIN — LORAZEPAM 3 MG: 2 INJECTION INTRAMUSCULAR at 19:07

## 2021-01-01 RX ADMIN — DEXMEDETOMIDINE HYDROCHLORIDE 0.8 MCG/KG/HR: 4 INJECTION INTRAVENOUS at 06:24

## 2021-01-01 RX ADMIN — Medication 8 MG/HR: at 13:25

## 2021-01-01 RX ADMIN — ENOXAPARIN SODIUM 30 MG: 100 INJECTION SUBCUTANEOUS at 20:20

## 2021-01-01 RX ADMIN — BUSPIRONE HYDROCHLORIDE 10 MG: 10 TABLET ORAL at 14:58

## 2021-01-01 ASSESSMENT — PAIN DESCRIPTION - DESCRIPTORS
DESCRIPTORS: PRESSURE
DESCRIPTORS: ACHING

## 2021-01-01 ASSESSMENT — PULMONARY FUNCTION TESTS
PIF_VALUE: 31.1
PIF_VALUE: 28.4
PIF_VALUE: 26
PIF_VALUE: 32.2
PIF_VALUE: 30
PIF_VALUE: 28
PIF_VALUE: 31
PIF_VALUE: 30.2
PIF_VALUE: 29
PIF_VALUE: 31
PIF_VALUE: 30
PIF_VALUE: 29.7
PIF_VALUE: 24.4
PIF_VALUE: 24.9
PIF_VALUE: 26
PIF_VALUE: 30.1
PIF_VALUE: 30
PIF_VALUE: 22.3
PIF_VALUE: 28.5
PIF_VALUE: 40
PIF_VALUE: 27.9
PIF_VALUE: 30.4
PIF_VALUE: 32
PIF_VALUE: 30
PIF_VALUE: 45
PIF_VALUE: 32
PIF_VALUE: 32
PIF_VALUE: 24.4
PIF_VALUE: 22
PIF_VALUE: 30
PIF_VALUE: 25.9
PIF_VALUE: 30
PIF_VALUE: 33
PIF_VALUE: 32
PIF_VALUE: 24.3
PIF_VALUE: 31
PIF_VALUE: 32
PIF_VALUE: 22
PIF_VALUE: 31
PIF_VALUE: 29.9
PIF_VALUE: 24
PIF_VALUE: 34
PIF_VALUE: 27.8
PIF_VALUE: 30.1
PIF_VALUE: 32
PIF_VALUE: 26
PIF_VALUE: 31.1
PIF_VALUE: 31
PIF_VALUE: 30
PIF_VALUE: 30
PIF_VALUE: 32
PIF_VALUE: 30
PIF_VALUE: 26
PIF_VALUE: 28
PIF_VALUE: 32
PIF_VALUE: 30
PIF_VALUE: 32.6
PIF_VALUE: 26
PIF_VALUE: 30
PIF_VALUE: 29.9
PIF_VALUE: 30.2
PIF_VALUE: 26
PIF_VALUE: 32
PIF_VALUE: 24
PIF_VALUE: 22.6
PIF_VALUE: 26.8
PIF_VALUE: 30

## 2021-01-01 ASSESSMENT — PAIN SCALES - GENERAL
PAINLEVEL_OUTOF10: 0
PAINLEVEL_OUTOF10: 1
PAINLEVEL_OUTOF10: 0
PAINLEVEL_OUTOF10: 1
PAINLEVEL_OUTOF10: 0
PAINLEVEL_OUTOF10: 5
PAINLEVEL_OUTOF10: 0
PAINLEVEL_OUTOF10: 2
PAINLEVEL_OUTOF10: 0
PAINLEVEL_OUTOF10: 6
PAINLEVEL_OUTOF10: 0
PAINLEVEL_OUTOF10: 0

## 2021-01-01 ASSESSMENT — ENCOUNTER SYMPTOMS
COUGH: 1
BLOOD IN STOOL: 0
NAUSEA: 0
VOMITING: 0
VOMITING: 0
COUGH: 0
DIARRHEA: 0
DIARRHEA: 0
VOICE CHANGE: 0
ABDOMINAL PAIN: 0
WHEEZING: 1
PHOTOPHOBIA: 0
CHEST TIGHTNESS: 1
CONSTIPATION: 0
SHORTNESS OF BREATH: 1
SHORTNESS OF BREATH: 1
SORE THROAT: 0
RHINORRHEA: 0
WHEEZING: 1
SORE THROAT: 1
SINUS PAIN: 0
APNEA: 0

## 2021-01-01 ASSESSMENT — PAIN DESCRIPTION - LOCATION
LOCATION: CHEST
LOCATION: GENERALIZED
LOCATION: CHEST

## 2021-01-01 ASSESSMENT — PAIN DESCRIPTION - PROGRESSION
CLINICAL_PROGRESSION: NOT CHANGED
CLINICAL_PROGRESSION: NOT CHANGED

## 2021-01-01 ASSESSMENT — PAIN - FUNCTIONAL ASSESSMENT
PAIN_FUNCTIONAL_ASSESSMENT: ACTIVITIES ARE NOT PREVENTED
PAIN_FUNCTIONAL_ASSESSMENT: ACTIVITIES ARE NOT PREVENTED

## 2021-01-01 ASSESSMENT — PAIN DESCRIPTION - ONSET
ONSET: ON-GOING
ONSET: ON-GOING

## 2021-01-01 ASSESSMENT — PAIN DESCRIPTION - FREQUENCY
FREQUENCY: CONTINUOUS
FREQUENCY: CONTINUOUS

## 2021-01-01 ASSESSMENT — PAIN DESCRIPTION - ORIENTATION
ORIENTATION: MID
ORIENTATION: ANTERIOR;POSTERIOR

## 2021-01-01 ASSESSMENT — PAIN DESCRIPTION - PAIN TYPE
TYPE: ACUTE PAIN

## 2021-01-01 ASSESSMENT — PAIN DESCRIPTION - DIRECTION: RADIATING_TOWARDS: GENERALIZED

## 2021-12-05 PROBLEM — U07.1 COVID-19: Status: ACTIVE | Noted: 2021-01-01

## 2021-12-05 NOTE — ED TRIAGE NOTES
Pt COVID positive with sx beginning around 11/29. Tested positive 12/2. Pt sent home with nasal cannula. Pt states he has gradually increased to 6L since he was seen at [facility] on Thursday. Pt reports O2 was 87% this morning on 6L NC. Pt currently 92% on 15L NRB. Pt is not vaccinated for covid.

## 2021-12-05 NOTE — ED PROVIDER NOTES
[facility]  eMERGENCY dEPARTMENT eNCOUnter          [address] [facility]       Chief Complaint   Patient presents with    Positive For Covid-19    Shortness of Breath       Nurses Notes reviewed and I agree except as noted inthe HPI. HISTORY OF PRESENT ILLNESS    Bello Castellanos is a 77 y.o. male who presents to the Emergency Department for the evaluation of Covid symptoms. Patient states the symptoms from Covid started on 11/29 and he had positive test 3 days ago. He was seen at another emergency department where he was started on supplemental oxygen and they wanted to admit him but did not have beds so he was ultimately discharged. Other than oxygen, he states he has not been started on any form of treatment. He has not received the Covid vaccine. Denies any pulmonary history and is not a smoker. He states he had sudden worsening of the shortness of breath today prompting his ED evaluation. He has had to progressively increase the oxygen via nasal cannula at home and was hypoxic on 6 L this morning prompting his ED evaluation. He reports associated headache, sore throat, cough, loss of taste/smell, generalized body aches, dizziness, shortness of breath and intermittent chest tightness/wheezing. No associated fevers, chest pain, vomiting, diarrhea, lower extremity edema or history of DVT/PE. Only medication he has been taking is his prescribed Lopressor. The HPI was provided by the patient. REVIEW OF SYSTEMS     Review of Systems   Constitutional: Negative for fever. HENT: Positive for sore throat. Respiratory: Positive for cough, chest tightness, shortness of breath and wheezing. Cardiovascular: Negative for chest pain and leg swelling. Gastrointestinal: Negative for abdominal pain, diarrhea and vomiting. Musculoskeletal: Positive for myalgias. Neurological: Positive for dizziness and headaches. All other systems reviewed and are negative.       PAST MEDICAL HISTORY    has a past medical history of COVID-19 and Hypertension. SURGICAL HISTORY      has no past surgical history on file. CURRENT MEDICATIONS       Previous Medications    METOPROLOL TARTRATE (LOPRESSOR) 25 MG TABLET    Take 25 mg by mouth 2 times daily       ALLERGIES     has No Known Allergies. FAMILY HISTORY     has no family status information on file. family history is not on file. SOCIAL HISTORY      reports that he has never smoked. He has never used smokeless tobacco. He reports previous alcohol use. He reports that he does not use drugs. PHYSICAL EXAM     INITIAL VITALS:  height is 6' 1\" (1.854 m) and weight is 240 lb (108.9 kg). His axillary temperature is 99.4 °F (37.4 °C). His blood pressure is 145/89 (abnormal) and his pulse is 107. His respiration is 16 and oxygen saturation is 96%. Physical Exam  Vitals and nursing note reviewed. Constitutional:       Appearance: He is well-developed. HENT:      Head: Normocephalic and atraumatic. Cardiovascular:      Rate and Rhythm: Regular rhythm. Tachycardia present. Heart sounds: Normal heart sounds. No murmur heard. Pulmonary:      Effort: Pulmonary effort is normal. No tachypnea, accessory muscle usage or respiratory distress. Breath sounds: Decreased breath sounds present. No wheezing or rhonchi. Abdominal:      Palpations: Abdomen is soft. Tenderness: There is no abdominal tenderness. Musculoskeletal:      Cervical back: Normal range of motion. Right lower leg: No tenderness. No edema. Left lower leg: No tenderness. No edema. Skin:     General: Skin is warm and dry. Neurological:      General: No focal deficit present. Mental Status: He is alert and oriented to person, place, and time.    Psychiatric:         Mood and Affect: Mood normal.         DIFFERENTIAL DIAGNOSIS:   Differential diagnoses are discussed    DIAGNOSTIC RESULTS     EKG: All EKG's are interpreted by the Emergency Department Physician who either signs or Co-signsthis chart in the absence of a cardiologist.    Rafaelbryce Bell. Rate: 114 bpm  PRinterval: 158 ms  QRS duration: 72 ms  QTc: 457 ms  P-R-T axes: 77, 6, -2  Sinus tachycardia with frequent PVCs. No STEMI. RADIOLOGY: non-plain film images(s) such as CT, Ultrasound and MRI are read by the radiologist.    CTA [address]   Final Result       1. No pulmonary emboli. 2. Diffuse groundglass opacity throughout both lungs with superimposed patchy areas of consolidation. **This report has been created using voice recognition software. It may contain minor errors which are inherent in voice recognition technology. **      Final report electronically signed by Dr. Deepa Blount on 12/5/2021 8:07 AM      XR CHEST PORTABLE   Final Result   Moderate severity bilateral areas of opacification which are compatible with Covid 19. **This report has been created using voice recognition software. It may contain minor errors which are inherent in voice recognition technology. **      Final report electronically signed by Dr. Deepa Blount on 12/5/2021 7:28 AM          LABS:      Labs Reviewed   BASIC METABOLIC PANEL W/ REFLEX TO MG FOR LOW K - Abnormal; Notable for the following components:       Result Value    Sodium 129 (*)     Chloride 95 (*)     CO2 21 (*)     Glucose 137 (*)     All other components within normal limits   CBC WITH AUTO DIFFERENTIAL - Abnormal; Notable for the following components:    WBC 14.0 (*)     RDW-CV 14.8 (*)     RDW-SD 48.0 (*)     Segs Absolute 13.0 (*)     Lymphocytes Absolute 0.5 (*)     Immature Grans (Abs) 0.08 (*)     All other components within normal limits   FERRITIN - Abnormal; Notable for the following components:    Ferritin 4,091 (*)     All other components within normal limits   C-REACTIVE PROTEIN - Abnormal; Notable for the following components:    CRP 22.27 (*)     All other components within normal limits   LACTATE, SEPSIS - Abnormal; Notable for the following components:    Lactic Acid, Sepsis 2.1 (*)     All other components within normal limits   PROCALCITONIN - Abnormal; Notable for the following components:    Procalcitonin 1.06 (*)     All other components within normal limits   GLOMERULAR FILTRATION RATE, ESTIMATED - Abnormal; Notable for the following components:    Est, Glom Filt Rate 60 (*)     All other components within normal limits   OSMOLALITY - Abnormal; Notable for the following components:    Osmolality Calc 264.1 (*)     All other components within normal limits   TROPONIN   BRAIN NATRIURETIC PEPTIDE   ANION GAP   SCAN OF BLOOD SMEAR       EMERGENCY DEPARTMENT COURSE:   Vitals:    Vitals:    12/05/21 0627 12/05/21 0725 12/05/21 0802   BP: (!) 141/79  (!) 145/89   Pulse: 115  107   Resp: 15 20 16   Temp: 99.4 °F (37.4 °C)     TempSrc: Axillary     SpO2: 91% 96% 96%   Weight: 240 lb (108.9 kg)     Height: 6' 1\" (1.854 m)        7:27 AM EST: The patient was seen and evaluated. Patient presents with tachycardia and increased oxygen demand in the scenario of known Covid. No prior pulmonary history and other than the supplemental oxygen, he has not been receiving anything for treatment. He had 14,000 white blood cell count with sodium 129, chloride 95 and bicarb 21. Cardiac markers were within normal limits but he had CRP of 22, ferritin 4091, lactic acid 2.1 and procalcitonin 1.06. Chest x-ray showed moderate severity bilateral areas of opacification which are compatible with COVID-19 and follow-up CTA again showed diffuse groundglass opacity throughout both lungs with superimposed patchy areas of consolidation and no pulmonary emboli. Rocephin and Zithromax were initiated as well as Decadron and he was given his morning dose of Lopressor which he had not yet taken. Discussed plan of admission with the patient who is agreeable and he will be admitted to the hospitalist service for further care.   He is currently doing well on high flow nasal cannula with improvement in symptoms. CRITICAL CARE:   None    CONSULTS:  Hospitalist    PROCEDURES:  None    FINAL IMPRESSION      1. COVID-19          DISPOSITION/PLAN   Admit    PATIENT REFERRED TO:  No follow-up provider specified.     DISCHARGEMEDICATIONS:  New Prescriptions    No medications on file       (Please note that portions of this note were completedwith a voice recognition program.  Efforts were made to edit the dictations but occasionally words are mis-transcribed.)        Rowan Marcial PA-C  12/05/21 6276

## 2021-12-05 NOTE — H&P
Hospitalist - History & Physical        Patient: Raheel [facility]    Unit/Bed:20/020A  YOB: 1955  MRN: [id number]   Acct: [de-identified]   PCP: Pankaj Meza    Date of Service: Pt seen/examined on 12/05/21 in the ER    and Admitted to Inpatient with expected LOS greater than two midnights due to medical therapy. Chief Complaint:  SOB     Assessment and Plan:-    1. COVID - 19 PNA  2. Acute respiratory failure due to above, COVID  3. Essential Hypertension  4. Hyponatremia  - 129 today , was normal about 2 months ago. 5. Obesity       Due to COVID - PNA and acute respiratory failure , he was initiated on HFNC at 100 %  Pulse ox is now at 96 %,  He failed outpatient Rx while on 6 L NCo2, will be admitted to the hospitalist service, and initiate him on Decadron 6 mg IV daily, and Vit C, D, Zinc and requested pharmacy to dose Baricitinib , and respiratory isolation. He   Was given PO decadron today in the ER . Essential HTN  Continue with current home dose of lopressor and   Tele for 72 hrs     Hyponatremia likely due to  Poor PO intake, as outlined above 2 months ago it was normal. Initiate him on low dose IVF and re check in am , no active diarrhea or N/V . Explained the above plan to patient and son in the ER and they consented to the admission. History Of Present Illness:      Pleasant 15-year-old white male who was seen in the emergency room today, due to respiratory failure. He was diagnosed with COVID-19 pneumonia about 4 days ago at [facility] in the emergency room and was discharged he claims on 6 L. Patient states he is getting worse she decided come into this ER for evaluation. Currently resting comfortably is on high flow nasal cannula,  Admits to  Coughing spells, but he is not sure of the color of the sputum. Patient is unvaccinated for Covid, lives at home with his wife  She is recovering from [place].  Seen in the emergency room with his son at bedside, and I was called to admit the pt for the hospitalist service. Patient does admit to having generalized body aches and mild shortness of breath and intermittent chest tightness but no syncopal episodes denies any history of previous blood clots, strokes. Does have decreased p.o. intake over the last couple of days , no diarrhea. CT imaging done in the emergency room did not reveal PE,  Has  groundglass opacity with patchy consolidation            Past Medical History:        Diagnosis Date    COVID-19 12/02/2021    Hypertension    He denies any known history of CAD, diabetes mellitus, bronchial asthma, no bleeding issues    Past Surgical History:    History reviewed. No pertinent surgical history. Home Medications:   No current facility-administered medications on file prior to encounter. Current Outpatient Medications on File Prior to Encounter   Medication Sig Dispense Refill    metoprolol tartrate (LOPRESSOR) 25 MG tablet Take 25 mg by mouth 2 times daily         Allergies:    Patient has no known allergies. Social History:    reports that he has never smoked. He has never used smokeless tobacco. He reports previous alcohol use. He reports that he does not use drugs. Family History:     Positive for hypertension, no CAD    Diet:  No diet orders on file    Review of systems:   Pertinent positives as noted in the HPI. All other systems reviewed and negative. PHYSICAL EXAM:    BP (!) 145/89   Pulse 107   Temp 99.4 °F (37.4 °C) (Axillary)   Resp 16   Ht 6' 1\" (1.854 m)   Wt 240 lb (108.9 kg)   SpO2 96%   BMI 31.66 kg/m²        General appearance: No apparent distress, appears stated age and cooperative. HEENT: Normal cephalic, atraumatic without obvious deformity. Pupils equal, round, and reactive to light. Extra ocular muscles intact. Conjunctivae/corneas clear. Neck: Supple, with full range of motion. No jugular venous distention. Trachea midline. Respiratory:  Normal respiratory effort. Clear to auscultation, but distant breath sounds  Cardiovascular: Regular rate and rhythm with normal S1/S2 without murmurs, rubs or gallops. Abdomen: Soft, non-tender, non-distended with normal bowel sounds. Musculoskeletal:  No clubbing, cyanosis or edema bilaterally. Skin: Skin color, texture, turgor normal.  No rashes or lesions. Neurologic:  Neurovascularly intact without any focal sensory/motor deficits. Cranial nerves: II-XII intact, grossly non-focal.  Psychiatric: Alert and oriented, thought content appropriate, normal insight  Capillary Refill: Brisk,< 3 seconds   Peripheral Pulses: +2 palpable, equal bilaterally     Labs:   Recent Labs     12/05/21  0645   WBC 14.0*   HGB 14.9   HCT 42.2        Recent Labs     12/05/21  0645   *   K 3.7   CL 95*   CO2 21*   BUN 21   CREATININE 1.2   CALCIUM 8.8     No results for input(s): AST, ALT, BILIDIR, BILITOT, ALKPHOS in the last 72 hours. No results for input(s): INR in the last 72 hours. No results for input(s): [place] in the last 72 hours. Urinalysis:    No results found for: Carolene Butter, BACTERIA, RBCUA, BLOODU, [address], [address]    Radiology:       CTA CHEST W WO CONTRAST   Final Result       1. No pulmonary emboli. 2. Diffuse groundglass opacity throughout both lungs with superimposed patchy areas of consolidation. **This report has been created using voice recognition software. It may contain minor errors which are inherent in voice recognition technology. **      Final report electronically signed by Dr. Zhane Morris on 12/5/2021 8:07 AM      XR CHEST PORTABLE   Final Result   Moderate severity bilateral areas of opacification which are compatible with Covid 19. **This report has been created using voice recognition software. It may contain minor errors which are inherent in voice recognition technology. **      Final report electronically signed by Dr. Zhane Morris on 12/5/2021 7:28 AM CTA CHEST W WO CONTRAST    Result Date: 12/5/2021  PROCEDURE: CTA CHEST W WO CONTRAST CLINICAL INFORMATION: Covid; SOB. COMPARISON: Chest x-ray 12/5/2021. TECHNIQUE: 3 mm axial images were obtained through the chest after the administration of IV contrast.  A non-contrast localizer was obtained. 3D reconstructions were performed on the scanner to include MIP coronal and sagittal images through the chest. Isovue was the intravenous contrast utilized. All CT scans at this facility use dose modulation, iterative reconstruction, and/or weight-based dosing when appropriate to reduce radiation dose to as low as reasonably achievable. FINDINGS: There is adequate opacification of the pulmonary arterial system. No pulmonary emboli are present. The aorta is within acceptable limits. The heart size is normal. There is no pericardial or pleural effusion. There is no mediastinal, axillary or hilar adenopathy. A few small mediastinal lymph nodes are present. These are of normal size. There are patchy areas of consolidation throughout both lungs. These areas are most pronounced in the mid and lower lung zones. There is superimposed nearly diffuse groundglass opacities throughout both lungs. Only a few areas of normally aerated lung are present. No suspicious osseous lesions are present. There are no suspicious findings in the imaged aspects of the upper abdomen. 1. No pulmonary emboli. 2. Diffuse groundglass opacity throughout both lungs with superimposed patchy areas of consolidation. **This report has been created using voice recognition software. It may contain minor errors which are inherent in voice recognition technology. ** Final report electronically signed by Dr. Tre Rodriguez on 12/5/2021 8:07 AM    XR CHEST PORTABLE    Result Date: 12/5/2021  PROCEDURE: XR CHEST PORTABLE CLINICAL INFORMATION: sob, covid. COVID 19 positive. . Shortness of breath. COMPARISON: No prior study.  TECHNIQUE: AP upright view of the chest. FINDINGS: The heart size is normal.The mediastinum is not widened. There are moderate severity infiltrates which are focally diffuse in the right upper lobe, right lung base and left midlung. There is no pneumothorax. There are no pleural effusions. The pulmonary vascularity is normal.No suspicious osseous lesions are present. Moderate severity bilateral areas of opacification which are compatible with Covid 19. **This report has been created using voice recognition software. It may contain minor errors which are inherent in voice recognition technology. ** Final report electronically signed by Dr. Starr Bright on 12/5/2021 7:28 AM            Electronically signed by Marisabel Stone MD on 12/5/2021 at 9:25 AM

## 2021-12-05 NOTE — ED NOTES
Pt transported to [facility] on cart in stable condition. Floor contacted before transport.       Rocky Silverman  12/05/21 7506

## 2021-12-05 NOTE — PROGRESS NOTES
Baricitinib Initiation Note    This patient meets criteria for baricitinib. Criteria:  Age 2 years or greater  COVID-19 positive & hospitalized  Requiring supplemental oxygen, high flow nasal cannula, invasive or non-invasive ventilation, or ECMO  Any elevation in any of the following: CRP, D-dimer, ferritin, or LDH  Must also be on dexamethasone     Exclusions: If patient already received tocilizumab (due to long half-life)  Patients with active TB    Special Considerations (that warrant provider discussion): Active DVT or PE  Pregnancy  Severe hepatic impairment  Chronic/recurrent infections  Hemoglobin < 8.0  Chronically immunosuppressed patients (drug or disease etiology)    eGFR:  Recent Labs     12/05/21  0645   LABGLOM 60*     Plan:  Start baricitinib 4 mg once daily x14. EUA Adult Dosing Reference for COVID-19 (FDA 2021):  eGFR ?60: No dosage adjustment necessary. eGFR 30 to <60: 2 mg once daily. eGFR 15 to <30: 1 mg once daily. eGFR <15: Use is not recommended. *Dosing is different for ages 3to 5years old.  (see Maggie)*

## 2021-12-05 NOTE — ED NOTES
ED nurse-to-nurse bedside report    Chief Complaint   Patient presents with    Positive For Covid-19    Shortness of Breath      LOC: alert and orientated to name, place, date  Vital signs   Vitals:    12/05/21 0627   BP: (!) 141/79   Pulse: 115   Resp: 15   Temp: 99.4 °F (37.4 °C)   TempSrc: Axillary   SpO2: 91%   Weight: 240 lb (108.9 kg)   Height: 6' 1\" (1.854 m)      Pain:    Pain Interventions: denies pain  Oxygen: Yes    Current needs required high flow   Telemetry: Yes  LDAs:   Peripheral IV 12/05/21 Left Antecubital (Active)   Site Assessment Clean; Dry; Intact 12/05/21 0642   Line Status Blood return noted; Specimen collected; Flushed 12/05/21 0642   Dressing Status Clean; Dry; Intact 12/05/21 0642   Dressing Intervention New 12/05/21 0642     Continuous Infusions:   Mobility: Independent  Willis Fall Risk Score: No flowsheet data found.   Report given to: Diana Kumari RN  12/05/21 8845

## 2021-12-05 NOTE — ED NOTES
Bed: 037A  Expected date:   Expected time:   Means of arrival:   Comments:  55 Hector Carpenter, RN  12/05/21 7440

## 2021-12-05 NOTE — ED NOTES
Pt returned from CT scan, placed back on high flow per this RN.       Alessia Rob, QUEENIE  12/05/21 9048

## 2021-12-06 NOTE — CARE COORDINATION
12/6/21, 7:22 AM EST  DISCHARGE PLANNING EVALUATION:    Carmelo Sacks       Admitted: 12/5/2021/ [facility] day: 1   Location: [address] [place] [address] Reason for admit: COVID-19 [U07.1]   PMH:  has a past medical history of COVID-19 and Hypertension. Procedure: none  Barriers to Discharge:  CRP 22.27, Na 129, WBC 14.0. Requiring high flow oxygen, FiO2 100, 40L, 93% sat. IVF, Vit C, baricitinib, IV decadron, lovenox, Vit D, and zinc in use. PCP: Leydi Warren  Readmission Risk Score: 9.2 ( )%    Patient Goals/Plan/Treatment Preferences:  Spoke with Branden's wife Mabel Oneil, she states he will return home at discharge. He has home oxygen already, is supplied from a [employer] company in [facility] (she is unsure of name). At this time, she does not anticipate discharge needs. Transportation/Food Security/Housekeeping Addressed:  No issues identified.

## 2021-12-06 NOTE — FLOWSHEET NOTE
12/06/21 0746   Family/Significant Other Communication   Reason Update   Name Mikayla   Relationship Spouse/Significant Other   Response Thankful   Method of Communication Phone     Over night Progress update given to Bam Brown, spouse. Medication Education given on all new medications. All questions answered at this time.

## 2021-12-06 NOTE — PROGRESS NOTES
Patient oxygen saturation 85-90% on 40L 100% high flow. Patient encouraged to do breathing in through nose and out through mouth. Dr. Librado Christine updated with possible need of bipap.

## 2021-12-06 NOTE — PROGRESS NOTES
Patient's wife called in. Update given. No additional questions or concerns voiced. Explained to patient that next update will be tomorrow morning unless there is a change in pt. Status. Wife verbalized understanding.

## 2021-12-07 NOTE — PROGRESS NOTES
filed at 12/7/2021 0022  Gross per 24 hour   Intake 590 ml   Output 600 ml   Net -10 ml       Diet:  ADULT DIET; Regular; No Added Salt (3-4 gm)    Exam:  BP (!) 155/93   Pulse 78   Temp 97.8 °F (36.6 °C) (Oral)   Resp 21   Ht 6' 1\" (1.854 m)   Wt 232 lb 3.2 oz (105.3 kg)   SpO2 (!) 89%   BMI 30.64 kg/m²     General appearance: No apparent distress, appears stated age and cooperative. HEENT: Pupils equal, round, and reactive to light. Conjunctivae/corneas clear. Neck: Supple, with full range of motion. No jugular venous distention. Trachea midline. Respiratory: Increased respiratory effort. Breath sounds diminished at bases. Cardiovascular: Regular rate and rhythm with normal S1/S2 without murmurs, rubs or gallops. Abdomen: Soft, non-tender, non-distended with normal bowel sounds. Musculoskeletal: passive and active ROM x 4 extremities. Skin: Skin color, texture, turgor normal.  No rashes or lesions. Neurologic:  Neurovascularly intact without any focal sensory/motor deficits. Cranial nerves: II-XII intact, grossly non-focal.  Psychiatric: Alert and oriented, thought content appropriate, normal insight  Capillary Refill: Brisk,< 3 seconds   Peripheral Pulses: +2 palpable, equal bilaterally       Labs:   Recent Labs     12/05/21  0645 12/06/21  0701   WBC 14.0* 8.2   HGB 14.9 13.0*   HCT 42.2 38.2*    224     Recent Labs     12/05/21  0645 12/06/21  0701   * 135   K 3.7 4.3   CL 95* 102   CO2 21* 22*   BUN 21 20   CREATININE 1.2 0.9   CALCIUM 8.8 8.4*     Recent Labs     12/06/21  0701   AST 41*   ALT 19   BILITOT 0.7   ALKPHOS 107     No results for input(s): INR in the last 72 hours. No results for input(s): Marie Mould in the last 72 hours. Microbiology:      Urinalysis:    No results found for: Dorethia Zee, BACTERIA, RBCUA, BLOODU, [address], [address]    Radiology:  CTA CHEST W WO CONTRAST   Final Result       1. No pulmonary emboli.    2. Diffuse groundglass opacity throughout both lungs with superimposed patchy areas of consolidation. **This report has been created using voice recognition software. It may contain minor errors which are inherent in voice recognition technology. **      Final report electronically signed by Dr. Rama Saleh on 12/5/2021 8:07 AM      XR CHEST PORTABLE   Final Result   Moderate severity bilateral areas of opacification which are compatible with Covid 19. **This report has been created using voice recognition software. It may contain minor errors which are inherent in voice recognition technology. **      Final report electronically signed by Dr. Rama Saleh on 12/5/2021 7:28 AM          DVT prophylaxis: [x] Lovenox                                 [] SCDs                                 [] SQ Heparin                                 [] Encourage ambulation           [] Already on Anticoagulation     Code Status: Full Code    PT/OT Eval Status:     Tele:   [x] yes             [] no    Electronically signed by Opal Nielsen MD on 12/7/2021 at 7:38 AM

## 2021-12-07 NOTE — PROGRESS NOTES
Hospitalist Progress Note          Patient:  Kev Lau      Unit/Bed:8B-32/032-A    YOB: 1955    MRN: [id number]       Acct: [de-identified]     PCP: Opal Carlisle    Date of Admission: 12/5/2021    Active Hospital Problems    Diagnosis Date Noted    COVID-19 [U07.1] 12/05/2021       Assessment/Plan:    COVID 19 PNA and Acute respiratory failure doing better clinically on supplemental O2 with HFNC, feels better. I spoke   With him re importance of getting vaccinated in about 3 months, he consented to the plan. No leukoctyosis. Hyponatremia  Is resolved, likely due to poor intake, on IVF  And claims his taste buds are getting better. Noted normal   TSH and T4. Essential HTN   -   Will increase the lopressor to 50 mg BID  For better BP control. Expected discharge date:   to be determined          Disposition:      [x] Home                             [] TCU                             [] Rehab                             [] Psych                             [] SNF                             [] [facility]                             [] Other-    Chief Complaint:   Chief Complaint   Patient presents with    Positive For Covid-19    Shortness of 11 [facility] Course: Patient was seen, examined and the medical chart was reviewed thoroughly today. In summary, 77 y.o.male admitted on 12/5/2021 for  Acute resp hypoxic resp failure, better with supplemental Oxygen with HFNC      Subjective (past 24 hours):   he looks and feels better , getting his taste back, eating better, no HA or any visual disturbances.      Medications:  Reviewed    Infusion Medications   Scheduled Medications    metoprolol tartrate  25 mg Oral BID    enoxaparin  30 mg SubCUTAneous BID    dexamethasone  6 mg IntraVENous Q24H    Vitamin D  2,000 Units Oral Daily    ascorbic acid  1,000 mg Oral Daily    pantoprazole  40 mg Oral QAM AC    zinc sulfate  50 mg Oral Daily    baricitinib  4 mg Oral Daily     PRN Meds: benzonatate, guaiFENesin-dextromethorphan, acetaminophen **OR** acetaminophen, ondansetron      Intake/Output Summary (Last 24 hours) at 12/6/2021 2048  Last data filed at 12/6/2021 1748  Gross per 24 hour   Intake 590 ml   Output 800 ml   Net -210 ml       Diet:  ADULT DIET; Regular; No Added Salt (3-4 gm)    Exam:  BP (!) 153/92   Pulse 94   Temp 97.7 °F (36.5 °C)   Resp 24   Ht 6' 1\" (1.854 m)   Wt 232 lb 3.2 oz (105.3 kg)   SpO2 92%   BMI 30.64 kg/m²     General appearance: A&O x3, Not ill or toxic, in no apparent distress  HEENT:  CRISTIANO  EOM intact. Neck: Supple, with full range of motion. No jugular venous distention. Trachea midline. Respiratory:   NL A/E bilat with no adventitious sounds   Cardiovascular:  normal S1/S2 with no murmurs/gallops  Abdomen: Soft, non-tender, non-distended, no rigidity or peritoneal signs  Musculoskeletal: NL symmetrical A/PROM bilat U/L extremities   Skin: No rashes. No edema  Neurologic:  CN II-XII intact. NL symmetrical reflexes. NL gait and stance. NL Cerebellar exam. Power 5/5 all muscle groups U/L extremities. Toes downgoing  Capillary Refill: Brisk,< 3 seconds   Peripheral Pulses: +2 palpable, equal bilaterally        Labs:   Recent Labs     12/05/21  0645 12/06/21  0701   WBC 14.0* 8.2   HGB 14.9 13.0*   HCT 42.2 38.2*    224     Recent Labs     12/05/21  0645 12/06/21  0701   * 135   K 3.7 4.3   CL 95* 102   CO2 21* 22*   BUN 21 20   CREATININE 1.2 0.9   CALCIUM 8.8 8.4*     Recent Labs     12/06/21  0701   AST 41*   ALT 19   BILITOT 0.7   ALKPHOS 107     No results for input(s): INR in the last 72 hours. No results for input(s): Alysa Risk in the last 72 hours.     Urinalysis:    No results found for: [place] Carbone, BACTERIA, [place]    Radiology:  CTA CHEST W WO CONTRAST    Result Date: 12/5/2021  PROCEDURE: CTA CHEST W WO CONTRAST CLINICAL INFORMATION: Covid; SOB. COMPARISON: Chest x-ray 12/5/2021. TECHNIQUE: 3 mm axial images were obtained through the chest after the administration of IV contrast.  A non-contrast localizer was obtained. 3D reconstructions were performed on the scanner to include MIP coronal and sagittal images through the chest. Isovue was the intravenous contrast utilized. All CT scans at this facility use dose modulation, iterative reconstruction, and/or weight-based dosing when appropriate to reduce radiation dose to as low as reasonably achievable. FINDINGS: There is adequate opacification of the pulmonary arterial system. No pulmonary emboli are present. The aorta is within acceptable limits. The heart size is normal. There is no pericardial or pleural effusion. There is no mediastinal, axillary or hilar adenopathy. A few small mediastinal lymph nodes are present. These are of normal size. There are patchy areas of consolidation throughout both lungs. These areas are most pronounced in the mid and lower lung zones. There is superimposed nearly diffuse groundglass opacities throughout both lungs. Only a few areas of normally aerated lung are present. No suspicious osseous lesions are present. There are no suspicious findings in the imaged aspects of the upper abdomen. 1. No pulmonary emboli. 2. Diffuse groundglass opacity throughout both lungs with superimposed patchy areas of consolidation. **This report has been created using voice recognition software. It may contain minor errors which are inherent in voice recognition technology. ** Final report electronically signed by Dr. Twyla Varma on 12/5/2021 8:07 AM    XR CHEST PORTABLE    Result Date: 12/5/2021  PROCEDURE: XR CHEST PORTABLE CLINICAL INFORMATION: sob, covid. COVID 19 positive. . Shortness of breath. COMPARISON: No prior study. TECHNIQUE: AP upright view of the chest. FINDINGS: The heart size is normal.The mediastinum is not widened.   There are moderate severity infiltrates which are focally diffuse in the right upper lobe, right lung base and left midlung. There is no pneumothorax. There are no pleural effusions. The pulmonary vascularity is normal.No suspicious osseous lesions are present. Moderate severity bilateral areas of opacification which are compatible with Covid 19. **This report has been created using voice recognition software. It may contain minor errors which are inherent in voice recognition technology. ** Final report electronically signed by Dr. Tiffany Joya on 12/5/2021 7:28 AM      Diet: ADULT DIET; Regular;  No Added Salt (3-4 gm)    DVT prophylaxis: [x] Lovenox                                 [] SCDs                                 [] SQ Heparin                                 [] Encourage ambulation           [] Already on Anticoagulation       Code Status: Full Code      Active Hospital Problems    Diagnosis Date Noted    COVID-19 [U07.1] 12/05/2021         Electronically signed by Arlet Jorge MD on 12/6/2021 at 8:58 PM

## 2021-12-07 NOTE — FLOWSHEET NOTE
12/07/21 0700   Family/Significant Other Communication   Reason update   Name luís   Relationship Spouse/Significant Other   Response thankful   Method of Communication Phone     Spoke with Darrin Petty, wife, and provided an overnight progress update. Discussed increased need for supplemental oxygen requiring patient to be placed on bipap. Also discussed increased dosage of metoprolol, starting ativan for anxiety, and starting tessalon pearls for cough. Offered to set up a zoom meeting through spiritual care, however she declined stating patient has his Ipad with him so they could video chat with that. Education given on bipap and plan of care. All questions answered at this time.

## 2021-12-08 NOTE — PROGRESS NOTES
Patients wife luís called and updated on patient status at this time. All questions and concerns addressed at this time.

## 2021-12-08 NOTE — PROGRESS NOTES
Patients daughter up to bring clean clothes. Updated at this time. All questions and concerns addressed at this time.

## 2021-12-08 NOTE — CARE COORDINATION
Discharge planning update:    BiPAP 80% FiO2, 60 liters oxygen with saturations at 95%. Tmax 101.8. Acapella, PT/OT, Vitamin C,D,zinc. Baricitinib, IV Zithromax, IV Rocephin, IV Decadron, Lovenox, Pepcid. 12/8 CXR    Impression:       1. Cardiomegaly. 2. Multifocal hazy bilateral infiltrates. From home with wife. Will follow for potential needs.    Electronically signed by Charlsie Prader, RN on 12/8/2021 at 11:37 AM

## 2021-12-08 NOTE — PROGRESS NOTES
[facility] 60  INPATIENT OCCUPATIONAL THERAPY  [facility] SUR[facility] 8B  EVALUATION    Time:    Time In:   Time Out: 1022  Timed Code Treatment Minutes: 23 Minutes  Minutes: 29          Date: 2021  Patient Name: Allyson Hope,   Gender: male      MRN: [id number]  : 1955  (77 y.o.)  Referring Practitioner: Dr. Patito Sun  Diagnosis: COVID 23  Additional Pertinent Hx: 77 y.o. male who presents to the Emergency Department for the evaluation of Covid symptoms. Patient states the symptoms from Covid started on  and he had positive test 3 days ago. He was seen at another emergency department where he was started on supplemental oxygen and they wanted to admit him but did not have beds so he was ultimately discharged. Other than oxygen, he states he has not been started on any form of treatment. He has not received the Covid vaccine. Denies any pulmonary history and is not a smoker. He states he had sudden worsening of the shortness of breath today prompting his ED evaluation. He has had to progressively increase the oxygen via nasal cannula at home and was hypoxic on 6 L this morning prompting his ED evaluation. He reports associated headache, sore throat, cough, loss of taste/smell, generalized body aches, dizziness, shortness of breath and intermittent chest tightness/wheezing. Restrictions/Precautions:  Restrictions/Precautions: General Precautions, Fall Risk, Isolation  Position Activity Restriction  Other position/activity restrictions: droplet +, COVID +    Subjective  Chart Reviewed: Yes, Orders, History and Physical, Progress Notes  Patient assessed for rehabilitation services?: Yes    Subjective: Pt lying in bed with bipap on. Rufina RN reporting she tried to place pt on high flow earlier but unable to maintain sats. RN requesting to attempt any activity with pt. Pt arousing ot name easily.     Pain:  Pain Assessment  Patient Currently in Pain: Denies    Vitals: Oxygen: Pt on bipap at being found to be COVID +. Pt on bipap at this time d/t decreased O2 sats requiring increased assistance for all ADL tasks. Pt would benefit from further skilled OT SErvices to increase his endurance to allow for him to tolerate completion of ADL tasks and mobility. Performance deficits / Impairments: Decreased functional mobility , Decreased endurance, Decreased ADL status, Decreased balance, Decreased strength, Decreased safe awareness  Prognosis: Fair  REQUIRES OT FOLLOW UP: Yes  Decision Making: Medium Complexity    Treatment Initiated: Treatment and education initiated within context of evaluation. Evaluation time included review of current medical information, gathering information related to past medical, social and functional history, completion of standardized testing, formal and informal observation of tasks, assessment of data and development of plan of care and goals. Treatment time included skilled education and facilitation of tasks to increase safety and independence with ADL's for improved functional independence and quality of life. Discharge Recommendations:  Patient would benefit from continued therapy after discharge, Continue to assess pending progress    Patient Education:  OT Education: OT Role, Plan of Care  Patient Education: breathing tech    Equipment Recommendations: Other: continue to assess    Plan:  Times per week: 3-5x  Plan Comment: increase frequency when pt is off of bipap. See long-term goal time frame for expected duration of plan of care. If no long-term goals established, a short length of stay is anticipated.     Goals:  Patient goals : be able to breath better  Short term goals  Time Frame for Short term goals: by discharge  Short term goal 1: pt to complete increase endurance to sit EOB > 15 min with O2 sats remianing above 90% to particiapte in ADL tasks  Short term goal 2: Pt to complete BADL routine with SBA and min cues for breathing tech  Short term goal 3: Pt to complete transfers from various surfaces icnluding BSC with CGA and no decrease in sats with min cues for breathing tech or safety to increase indepw ith toileting  Short term goal 4: Pt to aziza dynamic standing balance > 2 min with CGA and unilateral UE support to complete ADL rasks         Following session, patient left in safe position with all fall risk precautions in place.

## 2021-12-08 NOTE — PLAN OF CARE
Hospitalist Update Note    Patient had onset of chest pain, pleuritic around noon. D-dimer elevated at 62,822, bnp now elevated. Ordered echo, and stat CTA since worsening O2 status. CTA shows saddle pe (although heart strain reported on CTA it seems mild, and is not obstructing the pulmonary trunk). Hemodynamically - patient not hypotensive. D/w IR - at this time they recommend holding off thrombectomy. Patient was given therapeutic lovenox at 1pm, d/w pharmacy and will change to heparin drip starting at 11pm with bolus (discussed with RN). This way, if there becomes a need to intervene, he needs TPA, or bleeding complication arise can stop drip. Update: Echo shows no right heart strain. Patient remains stable. Will monitor status closely. If worsening status may require intubation and re-consideration for thrombectomy, but most of patient's hypoxemia is related to covid pneumonia. Change to IV lasix to maintain net negative fluid balance, monitor renal function. Patient is still bipap dependent.      Bill Baker,   12/8/2021  5:24 PM

## 2021-12-08 NOTE — PROGRESS NOTES
Hospitalist Progress Note    Patient:  Giovana Gamble      Unit/Bed:8B-32/032-A    YOB: 1955    MRN: [id number]       Acct: [de-identified]     PCP: Teri Muro    Date of Admission: 12/5/2021    Assessment/Plan:    1. Acute hypoxic respiratory failure 2/2 Novel [place] Coronavirus -maximal oxygen requirements on HFNC, switched to BiPAP, doing ok, breathing unlabored for now. Patient on dexamethasone,baricitinib, Pepcid, short course of Zithromax and Rocephin. Initial pro-Fernie 1.06, trending down. Initial CRP 22.27, trending. CTA chest 12/5/2021 no PE. Clinically no DVT. Discussed potential need for intubation with potentially requiring trach, patient agreeable. 2. Primary HTN -on Lopressor 50 mg p.o. twice daily, lisinopril 10mg PO qd.  3. Hypovolemic hyponatremia -resolved after IVF. Expected discharge date:  tbd    Disposition:    [] Home       [] TCU       [] Rehab       [] Psych       [] SNF       [] [facility]       [] Other-    Chief Complaint: [facility] Course:   Patient was seen, examined and the medical chart was reviewed thoroughly today. In summary, 77 y.o.male admitted on 12/5/2021 for  Acute resp hypoxic resp failure, maxed out on supplemental Oxygen with HFNC and switched to BiPAP. Subjective (past 24 hours):   Patient was seen and examined. He denies any new complaints. Currently on BiPAP, breathing unlabored. PT OT to see. Patient feels thirsty but otherwise denies chest pain, leg swelling, hemoptysis, headache. ROS (12 point review of systems completed. Pertinent positives noted. Otherwise ROS is negative).     Medications:  Reviewed    Infusion Medications    sodium chloride 50 mL/hr at 12/07/21 8831     Scheduled Medications    dexamethasone  20 mg IntraVENous Daily    famotidine  20 mg Oral BID    cefTRIAXone (ROCEPHIN) IV  1,000 mg IntraVENous Q24H    azithromycin  500 mg IntraVENous Q24H    metoprolol tartrate  50 mg Oral BID    lisinopril  10 mg Oral Daily    enoxaparin  30 mg SubCUTAneous BID    Vitamin D  2,000 Units Oral Daily    ascorbic acid  1,000 mg Oral Daily    zinc sulfate  50 mg Oral Daily    baricitinib  4 mg Oral Daily     PRN Meds: LORazepam, melatonin, diphenhydrAMINE, benzonatate, guaiFENesin-dextromethorphan, acetaminophen **OR** acetaminophen, ondansetron      Intake/Output Summary (Last 24 hours) at 12/8/2021 0928  Last data filed at 12/8/2021 0814  Gross per 24 hour   Intake 1589.55 ml   Output 1150 ml   Net 439.55 ml       Diet:  ADULT DIET; Regular; No Added Salt (3-4 gm)    Exam:  BP (!) 162/86   Pulse 100   Temp 97.9 °F (36.6 °C) (Axillary)   Resp 27   Ht 6' 1\" (1.854 m)   Wt 241 lb 2.9 oz (109.4 kg)   SpO2 91%   BMI 31.82 kg/m²     General appearance: No apparent distress, appears stated age and cooperative. HEENT: Pupils equal, round, and reactive to light. Conjunctivae/corneas clear. Neck: Supple, with full range of motion. No jugular venous distention. Trachea midline. Respiratory: Breathing unlabored. Breath sounds diminished at bases, diffuse crackles lower lung fields. Cardiovascular: Regular rate and rhythm with normal S1/S2 without murmurs, rubs or gallops. Abdomen: Soft, non-tender, non-distended with normal bowel sounds. Musculoskeletal: passive and active ROM x 4 extremities. Skin: Skin color, texture, turgor normal.  No rashes or lesions. Neurologic:  Neurovascularly intact without any focal sensory/motor deficits.  Cranial nerves: II-XII intact, grossly non-focal.  Psychiatric: Alert and oriented, thought content appropriate, normal insight  Capillary Refill: Brisk,< 3 seconds   Peripheral Pulses: +2 palpable, equal bilaterally       Labs:   Recent Labs     12/06/21  0701 12/07/21  0819 12/08/21  0600   WBC 8.2 12.8* 8.8   HGB 13.0* 13.7* 13.0*   HCT 38.2* 40.3* 39.5*    250 234     Recent Labs     12/06/21  0701 12/07/21  0819 12/08/21  0600    134* 135   K 4.3 4.2 4.2  101 101   CO2 22* 21* 21*   BUN 20 29* 31*   CREATININE 0.9 1.0 1.0   CALCIUM 8.4* 8.6 8.3*     Recent Labs     12/06/21  0701 12/08/21  0600   AST 41* 47*   ALT 19 21   BILIDIR  --  0.5*   BILITOT 0.7 1.2   ALKPHOS 107 147*     No results for input(s): INR in the last 72 hours. No results for input(s): Aviva Horsfall in the last 72 hours. Microbiology:      Urinalysis:    No results found for: Mely Bonine, BACTERIA, RBCUA, BLOODU, SPECGRAV, [address]    Radiology:  XR CHEST PORTABLE   Final Result   1. Cardiomegaly. 2. Multifocal hazy bilateral infiltrates. This document has been electronically signed by: Lisa Gray M.D. on    12/08/2021 05:48 AM      CTA CHEST W [address]   Final Result       1. No pulmonary emboli. 2. Diffuse groundglass opacity throughout both lungs with superimposed patchy areas of consolidation. **This report has been created using voice recognition software. It may contain minor errors which are inherent in voice recognition technology. **      Final report electronically signed by Dr. Miguel Vogt on 12/5/2021 8:07 AM      XR CHEST PORTABLE   Final Result   Moderate severity bilateral areas of opacification which are compatible with Covid 19. **This report has been created using voice recognition software. It may contain minor errors which are inherent in voice recognition technology. **      Final report electronically signed by Dr. Miguel Vogt on 12/5/2021 7:28 AM          DVT prophylaxis: [x] Lovenox                                 [] SCDs                                 [] SQ Heparin                                 [] Encourage ambulation           [] Already on Anticoagulation     Code Status: Full Code    PT/OT Eval Status:     Tele:   [x] yes             [] no    Electronically signed by Zia Rodriguez MD on 12/8/2021 at 9:28 AM

## 2021-12-09 NOTE — PROGRESS NOTES
Update given to [address] and wife, Vaishali Owen. All questions answered. Family requested switch to IV vitamin c, dexamethasone, and try ivermecton. Would like to set up a Zoom call as soon as possible. Let day shift nurse know of these requests.

## 2021-12-09 NOTE — FLOWSHEET NOTE
Pt is at the covid floor and his wife wanted a Zoom meeting today at 6pm     12/09/21 1010   Encounter Summary   Services provided to: Patient   Referral/Consult From: Rounding   Support System Spouse   Continue Visiting Yes  (12/9 )   Complexity of Encounter Low   Length of Encounter 15 minutes   Routine   Type Initial   Assessment Approachable; Calm   Intervention Nurtured hope;  Active listening   Outcome Acceptance; Expressed gratitude

## 2021-12-09 NOTE — PROGRESS NOTES
[facility]  INPATIENT PHYSICAL THERAPY  EVALUATION  [facility] MED SURG 8B - 8B-32/032-A    Time In: 0375  Time Out: 9841  Timed Code Treatment Minutes: 15 Minutes  Minutes: 26          Date: 2021  Patient Name: Carmelo Sacks,  Gender:  male        MRN: [id number]  : 1955  (77 y.o.)      Referring Practitioner: Guzman Lou DO  Diagnosis: [id number]  Additional Pertinent Hx: In summary, 77 y.o.male admitted on 2021 for  Acute resp hypoxic resp failure, maxed out on supplemental Oxygen with HFNC and switched to BiPAP. On the third day of admission, patient developed PE while on prophylactic dose of Lovenox and while on treatment for COVID-19 was Decadron and baricitinib. Patient was not a candidate for mechanical thrombectomy and was continued on heparin gtt. Restrictions/Precautions:  Restrictions/Precautions: General Precautions, Fall Risk, Isolation  Position Activity Restriction  Other position/activity restrictions: droplet +, COVID +    Subjective:  Chart Reviewed: Yes  Patient assessed for rehabilitation services?: Yes  Family / Caregiver Present: No  Subjective: RN approved session. Pt pleasant and agreeable to therapy  *Time spent educating pt on self proning    General:  Overall Orientation Status: Within Functional Limits  Follows Commands: Within Functional Limits    Vision: Impaired  Vision Exceptions: Wears glasses for reading    Hearing: Within functional limits         Pain: 0/10: denies pain     Vitals: Oxygen: Bipap 100% O2, PO2 >90%, does drop to 88% upon first sitting up, quickly recovers and remain >90 throughout session.      Social/Functional History:    Lives With: Spouse  Type of Home: House  Home Layout: One level  Home Access: Stairs to enter with rails  Entrance Stairs - Number of Steps: 2  Home Equipment:  (no equipment)     Bathroom Shower/Tub: Tub/Shower unit  Bathroom Toilet: Standard  Bathroom Accessibility: Accessible       ADL Assistance: facilitation of tasks to increase safety and independence with functional mobility for improved independence and quality of life. Assessment: Body structures, Functions, Activity limitations: Decreased functional mobility , Decreased strength, Decreased endurance, Decreased posture, Decreased balance  Assessment: Pt demonstrates a decrease in baseline by way of bed mobility, transfers and ambulation secondary to decreased activity tolerance, strength, fatigue, and balance deficits. Pt requiring supplemental oxygen at this time to maintain O2 saturations >90%. Pt will benefit from skilled PT services throughout admission and beyond hospital discharge for improvements in functional mobility and in order to decrease fall risk and return pt to OF.      Prognosis: Good    REQUIRES PT FOLLOW UP: Yes    Discharge Recommendations:  Discharge Recommendations: Continue to assess pending progress    Patient Education:  PT Education: Goals, PT Role, Plan of Care, Energy Conservation, General Safety, Functional Mobility Training    Equipment Recommendations: NA       Plan:  Times per week: 5x C  Times per day: Daily  Current Treatment Recommendations: Strengthening, Patient/Caregiver Education & Training, Equipment Evaluation, Education, & procurement, Balance Training, Functional Mobility Training, Safety Education & Training, Stair training, Home Exercise Program, Gait Training, Transfer Training    Goals:  Patient goals : none stated  Short term goals  Time Frame for Short term goals: by discharge  Short term goal 1: bed mobility with HOB flat, no rails, mod I for increased functional ind  Short term goal 2: sit<>Stand from various surfaces with LRD Mod I for safe transfers  Short term goal 3: ambulate 48' with LRD mod I and O2 sats >90% for safe household ambulation  Short term goal 4: Navigate 2 steps LRD Mod I for safe enter/exit of home  Long term goals  Time Frame for Long term goals : NA d/t short ELOS    Following session, patient left in safe position with all fall risk precautions in place.     Lydia Rowe PT, DPT

## 2021-12-09 NOTE — FLOWSHEET NOTE
facilitated Zoom for patient and family. Oriented them to Zoom environment and asked that no images be captured to protect patient and staff privacy. Offered prayer with family. Introduced family to nurse. After call,  wiped down unit and retuned it to a charging position. Wife, children and grandchildren on Zoom call. Prepared the grands for what they would see ( grandpa with bipap mask) and let family know that while patient can talk, it is difficult with the mask. Patient was thrilled to see his family.

## 2021-12-09 NOTE — CARE COORDINATION
Discharge Planning Update:    Hospitalist following. + PE. Remains on bipap. Na+ 130. Tessalon. Vit C, d, zinc. IV decadron. IV rocephin. IV lasix. Heparin gtt. Plans home with wife, has O2 from [place] [facility].

## 2021-12-09 NOTE — PROGRESS NOTES
Hospitalist Progress Note    Patient:  Bello Castellanos      Unit/Bed:8B-32/032-A    YOB: 1955    MRN: [id number]       Acct: [de-identified]     PCP: Elke Gallagher    Date of Admission: 12/5/2021    Assessment/Plan:    1. Acute hypoxic respiratory failure 2/2 Novel [place] Coronavirus and PE -maximal oxygen requirements on HFNC, switched to BiPAP, doing ok, breathing unlabored for now. Patient on decadron 20, baricitinib DC'ed d/t PE, Pepcid, short course of Zithromax and Rocephin. Initial pro-Fernie 1.06, trending down. Initial CRP 22.27, trending. Clinically no DVT. Discussed potential need for intubation with potentially requiring trach, patient agreeable. 2. New-onset hemodynamically stable saddle PE -on 12/28 patient developed pleuritic CP while on Lovenox prophylaxis and baricitinib, D-dimer at that time 62, 822, elevated BNP. Received one-time Lasix. EKG without acute changes. BP stable and actually elevated. CTA chest 12/8 c/w saddle PE w/o significant RH strain. Notably, CTA chest 12/5 shows no PE. Echo shows no RH strain, EF 50%. D/w /IR, recommended no mechanical thrombectomy at this time, continue anticoagulation. Initially was started on therapeutic dose of Lovenox, later transitioned to heparin gtt. If patient develops hemodynamic compromise, will need catheter-guided or systemic tPA or mechanical thrombectomy. 3. Primary HTN -on Lopressor 50 mg p.o. twice daily, lisinopril 10mg PO qd. Poorly controlled, withholding aggressive treatment in the setting of PE.  4. Hypovolemic hyponatremia -resolved after IVF. Trending. 5. Liver nodule of unclear significance -noted on CTA chest 12/8: 1.4 x 1.3 cm enhancing nodule, possible hepatic hemangioma.         Expected discharge date:  tbd    Disposition:    [x] Home       [] TCU       [] Rehab       [] Psych       [] SNF       [] [facility]       [] Other-    Chief Complaint: [facility] LE[facility]UN[facility] Course:   Patient was seen, examined and the medical chart was reviewed thoroughly today. In summary, 77 y.o.male admitted on 12/5/2021 for  Acute resp hypoxic resp failure, maxed out on supplemental Oxygen with HFNC and switched to BiPAP. On the third day of admission, patient developed PE while on prophylactic dose of Lovenox and while on treatment for COVID-19 was Decadron and baricitinib. Patient was not a candidate for mechanical thrombectomy and was continued on heparin gtt. Subjective (past 24 hours):   Patient was seen and examined. No new complaints. Currently on BiPAP. Patient denies hemoptysis, hematochezia, melena, headache, diarrhea, chest pain, fever. ROS (12 point review of systems completed. Pertinent positives noted. Otherwise ROS is negative). Medications:  Reviewed    Infusion Medications    heparin (PORCINE) Infusion 14 Units/kg/hr (12/09/21 0433)     Scheduled Medications    furosemide  20 mg Oral BID    dexamethasone  20 mg IntraVENous Daily    lisinopril  20 mg Oral Daily    cefTRIAXone (ROCEPHIN) IV  1,000 mg IntraVENous Q24H    metoprolol tartrate  50 mg Oral BID    Vitamin D  2,000 Units Oral Daily    ascorbic acid  1,000 mg Oral Daily    zinc sulfate  50 mg Oral Daily     PRN Meds: heparin (porcine), heparin (porcine), LORazepam, melatonin, diphenhydrAMINE, benzonatate, guaiFENesin-dextromethorphan, acetaminophen **OR** acetaminophen, ondansetron      Intake/Output Summary (Last 24 hours) at 12/9/2021 0821  Last data filed at 12/9/2021 0751  Gross per 24 hour   Intake 1619.12 ml   Output 2935 ml   Net -1315.88 ml       Diet:  ADULT DIET; Regular; No Added Salt (3-4 gm)    Exam:  BP (!) 158/82   Pulse 93   Temp 97.3 °F (36.3 °C) (Axillary)   Resp 20   Ht 6' 1\" (1.854 m)   Wt 241 lb 2.9 oz (109.4 kg)   SpO2 90%   BMI 31.82 kg/m²     General appearance: No apparent distress, appears stated age and cooperative. HEENT: Pupils equal, round, and reactive to light. Conjunctivae/corneas clear. Neck: Supple, with full range of motion. No jugular venous distention. Trachea midline. Respiratory: Breathing unlabored. Breath sounds diminished at bases, diffuse crackles lower lung fields. Cardiovascular: Regular rate and rhythm with normal S1/S2 without murmurs, rubs or gallops. Abdomen: Soft, non-tender, non-distended with normal bowel sounds. Musculoskeletal: passive and active ROM x 4 extremities. Skin: Skin color, texture, turgor normal.  No rashes or lesions. Neurologic:  Neurovascularly intact without any focal sensory/motor deficits. Cranial nerves: II-XII intact, grossly non-focal.  Psychiatric: Alert and oriented, thought content appropriate, normal insight  Capillary Refill: Brisk,< 3 seconds   Peripheral Pulses: +2 palpable, equal bilaterally       Labs:   Recent Labs     12/07/21  0819 12/08/21  0600 12/09/21  0227   WBC 12.8* 8.8 10.3   HGB 13.7* 13.0* 13.8*   HCT 40.3* 39.5* 39.2*    234 249     Recent Labs     12/07/21  0819 12/08/21  0600 12/09/21  0227   * 135 130*   K 4.2 4.2 4.1    101 94*   CO2 21* 21* 20*   BUN 29* 31* 26*   CREATININE 1.0 1.0 0.9   CALCIUM 8.6 8.3* 8.4*     Recent Labs     12/08/21  0600   AST 47*   ALT 21   BILIDIR 0.5*   BILITOT 1.2   ALKPHOS 147*     Recent Labs     12/08/21  1731   INR 1.31*     No results for input(s): CKTOTAL, TROPONINI in the last 72 hours. Microbiology:      Urinalysis:    No results found for: Rashaad Kugel, BACTERIA, RBCUA, BLOODU, [address], [address]    Radiology:  CTA CHEST W WO CONTRAST   Final Result      1. A saddle pulmonary embolus is seen that extends predominantly into the left pulmonary arterial system involving the left main pulmonary artery, the left upper lobe pulmonary artery, the left upper lobe segmental and subsegmental pulmonary arteries. There is suspicion for right heart strain. Echocardiogram should be obtained to confirm the right at strain.  The critical  finding(s) [] SCDs                                 [] SQ Heparin                                 [] Encourage ambulation           [x] Already on Anticoagulation -Heparin gtt     Code Status: Full Code    PT/OT Eval Status:     Tele:   [x] yes             [] no    Electronically signed by Verito Montes MD on 12/9/2021 at 8:21 AM

## 2021-12-10 NOTE — PROGRESS NOTES
Update given to Saul Coy. Saul Coy requesting that a physican call them, and that patient be given home medication of Ivermectin. Also is asking that Decadron be switched to Solumedrol. Dr. Lynette Mata perfect served regarding patient requests. Dr. Lynette Mata requested to pass on to day shift hospitalist due to her patient load.

## 2021-12-10 NOTE — PROGRESS NOTES
Assistance    Ambulation:  Not Tested    Balance:  Static Sitting Balance:  Stand By Assistance  Static Standing Balance: Contact Guard Assistance    Exercise:  Patient was guided in 1 set(s) 10 reps of exercise to both lower extremities. Seated marches, Seated hamstring curls, Seated heel/toe raises, Long arc quads and Standing marches. Exercises were completed for increased independence with functional mobility. Functional Outcome Measures: Completed  AM-PAC Inpatient Mobility Raw Score : 17  AM-PAC Inpatient T-Scale Score : 42.13    ASSESSMENT:  Assessment: Patient progressing toward established goals. Activity Tolerance:  Patient tolerance of  treatment: good. Pt with good oxygen saturations throughout session.       Equipment Recommendations:   Discharge Recommendations: Continue to assess pending progress and Patient would benefit from continued PT at discharge  Plan: Times per week: 5x C  Times per day: Daily  Current Treatment Recommendations: Strengthening, Patient/Caregiver Education & Training, Equipment Evaluation, Education, & procurement, Balance Training, Functional Mobility Training, Safety Education & Training, Stair training, Home Exercise Program, Gait Training, Transfer Training    Patient Education  Patient Education: Precautions/Restrictions, [employer] Mobility, Transfers, Gait, Verbal Exercise Instruction    Goals:  Patient goals : none stated  Short term goals  Time Frame for Short term goals: by discharge  Short term goal 1: bed mobility with HOB flat, no rails, mod I for increased functional ind  Short term goal 2: sit<>Stand from various surfaces with LRD Mod I for safe transfers  Short term goal 3: ambulate 48' with LRD mod I and O2 sats >90% for safe household ambulation  Short term goal 4: Navigate 2 steps LRD Mod I for safe enter/exit of home  Long term goals  Time Frame for Long term goals : NA d/t short ELOS    Following session, patient left in safe position with all fall risk precautions in place.     Boone Collins PT, DPT

## 2021-12-10 NOTE — PROGRESS NOTES
on unit and rounding. This nurse updated them that family is requesting that patient resume their home med Ivermectin and solu medrol. Dr. Inder Auguste stated that patient will not receive that that medication and patient is already on decadron. Will continue to monitor.

## 2021-12-10 NOTE — PROGRESS NOTES
Hospitalist Progress Note    Patient:  Anushka Lau      Unit/Bed:8B-32/032-A    YOB: 1955    MRN: [id number]       Acct: [de-identified]     PCP: Tj Wilson    Date of Admission: 12/5/2021    Assessment/Plan:    1. Acute hypoxic respiratory failure 2/2 Novel [place] Coronavirus and PE -maximal oxygen requirements on HFNC, switched to BiPAP, doing ok, breathing unlabored for now. Patient on decadron 20, baricitinib DC'ed d/t PE, Pepcid, short course of Zithromax and Rocephin. Initial pro-Fernie 1.06, trending down. Initial CRP 22.27, trending. Clinically no DVT. Discussed potential need for intubation with potentially requiring trach, patient agreeable. 2. New-onset hemodynamically stable saddle PE -on 12/28 patient developed pleuritic CP while on Lovenox prophylaxis and baricitinib, D-dimer at that time 62, 822, elevated BNP. Received one-time Lasix. EKG without acute changes. BP stable and actually elevated. CTA chest 12/8 c/w saddle PE w/o significant RH strain. Notably, CTA chest 12/5 shows no PE. Echo shows no RH strain, EF 50%. D/w /IR, recommended no mechanical thrombectomy at this time, continue anticoagulation. Initially was started on therapeutic dose of Lovenox, later transitioned to heparin gtt. If patient develops hemodynamic compromise, will need catheter-guided or systemic tPA or mechanical thrombectomy. 3. Primary HTN -on Lopressor 50 mg p.o. twice daily, lisinopril 10mg PO qd. Poorly controlled, withholding aggressive treatment in the setting of PE.  4. Diuretic induced hyponatremia -  Trending. Patient had increased oxygen requirement therefore diuretics were started. We will continue to monitor and stop diuretics when indicated. 5. Liver nodule of unclear significance -noted on CTA chest 12/8: 1.4 x 1.3 cm enhancing nodule, possible hepatic hemangioma.         Expected discharge date:  tbd    Disposition:    [x] Home       [] TCU       [] Rehab       [] Psych       [] SNF       [] [place]       [] Other-    Chief Complaint: \[facility] [place] [facility]\"" LEJEUNE Course:   Patient was seen, examined and the medical chart was reviewed thoroughly today. In summary, 77 y.o.male admitted on 12/5/2021 for  Acute resp hypoxic resp failure, maxed out on supplemental Oxygen with HFNC and switched to BiPAP. On the third day of admission, patient developed PE while on prophylactic dose of Lovenox and while on treatment for COVID-19 was Decadron and baricitinib. Patient was not a candidate for mechanical thrombectomy and was continued on heparin gtt. Subjective (past 24 hours):   Patient was seen and examined, resting comfortably in bed. No new complaints other than feeling thirsty likely as a result of BiPAP use. Patient denies hemoptysis, melena, hematochezia, headache, chest pain, fever, diarrhea. Spoke with wife and updated family on patient's guarded hospital course. ROS (12 point review of systems completed. Pertinent positives noted. Otherwise ROS is negative). Medications:  Reviewed    Infusion Medications    heparin (PORCINE) Infusion 16 Units/kg/hr (12/10/21 0534)     Scheduled Medications    busPIRone  10 mg Oral TID    famotidine  20 mg Oral BID    furosemide  20 mg IntraVENous Daily    dexamethasone  20 mg IntraVENous Daily    lisinopril  20 mg Oral Daily    cefTRIAXone (ROCEPHIN) IV  1,000 mg IntraVENous Q24H    metoprolol tartrate  50 mg Oral BID    Vitamin D  2,000 Units Oral Daily    ascorbic acid  1,000 mg Oral Daily    zinc sulfate  50 mg Oral Daily     PRN Meds: heparin (porcine), heparin (porcine), LORazepam, melatonin, diphenhydrAMINE, benzonatate, guaiFENesin-dextromethorphan, acetaminophen **OR** acetaminophen, ondansetron      Intake/Output Summary (Last 24 hours) at 12/10/2021 0740  Last data filed at 12/10/2021 0251  Gross per 24 hour   Intake 625 ml   Output 1420 ml   Net -795 ml       Diet:  ADULT DIET; Regular;  No Added Salt (3-4 gm)    Exam:  BP (!) 154/97   Pulse 84   Temp 97.2 °F (36.2 °C) (Oral)   Resp 20   Ht 6' 1\" (1.854 m)   Wt 241 lb 2.9 oz (109.4 kg)   SpO2 95%   BMI 31.82 kg/m²     General appearance: No apparent distress, appears stated age and cooperative. HEENT: Pupils equal, round, and reactive to light. Conjunctivae/corneas clear. Neck: Supple, with full range of motion. No jugular venous distention. Trachea midline. Respiratory: Breathing unlabored. Breath sounds diminished at bases, diffuse crackles lower lung fields. Cardiovascular: Regular rate and rhythm with normal S1/S2 without murmurs, rubs or gallops. Abdomen: Soft, non-tender, non-distended with normal bowel sounds. Musculoskeletal: passive and active ROM x 4 extremities. Skin: Skin color, texture, turgor normal.  No rashes or lesions. Neurologic:  Neurovascularly intact without any focal sensory/motor deficits. Cranial nerves: II-XII intact, grossly non-focal.  Psychiatric: Alert and oriented, thought content appropriate, normal insight  Capillary Refill: Brisk,< 3 seconds   Peripheral Pulses: +2 palpable, equal bilaterally       Labs:   Recent Labs     12/07/21  0819 12/08/21  0600 12/09/21 0227   WBC 12.8* 8.8 10.3   HGB 13.7* 13.0* 13.8*   HCT 40.3* 39.5* 39.2*    234 249     Recent Labs     12/08/21  0600 12/09/21 0227 12/09/21 2017    130* 132*   K 4.2 4.1 4.2    94* 93*   CO2 21* 20* 23   BUN 31* 26* 28*   CREATININE 1.0 0.9 1.0   CALCIUM 8.3* 8.4* 9.1     Recent Labs     12/08/21  0600   AST 47*   ALT 21   BILIDIR 0.5*   BILITOT 1.2   ALKPHOS 147*     Recent Labs     12/08/21  1731   INR 1.31*     No results for input(s): CKTOTAL, TROPONINI in the last 72 hours. Microbiology:      Urinalysis:    No results found for: Rashaad Kugel, BACTERIA, RBCUA, BLOODU, [place], [address]    Radiology:  XR CHEST PORTABLE   Final Result   1. Normal heart size. No effusion.    2. Moderate groundglass infiltrates scattered in both lungs diffusely, consistent with interstitial pneumonia/edema. 3. Overall appearance of chest slightly worse than prior. **This report has been created using voice recognition software. It may contain minor errors which are inherent in voice recognition technology. **      Final report electronically signed by Dr. Brandin Guerra on 12/9/2021 10:21 AM      CTA CHEST W [address] [place] [facility]   Final Result      1. A saddle pulmonary embolus is seen that extends predominantly into the left pulmonary arterial system involving the left main pulmonary artery, the left upper lobe pulmonary artery, the left upper lobe segmental and subsegmental pulmonary arteries. There is suspicion for right heart strain. Echocardiogram should be obtained to confirm the right at strain. The critical  finding(s) was called to Dr. Ellie Pepe at [phone] hours on 12/8/2021 by Dr. Kristy Reyes. Verbal acknowledgment and readback was given. 2. Diffuse groundglass opacification of both lungs with patchy areas of consolidation noted. Findings relate to multilobar pneumonia as can be seen with Covid-19 infection. 3. Very small bilateral pleural effusions are seen. 4. A 1.4 x 1.3 cm enhancing nodule in segment 5 of the liver is not well characterized on this study. May represent a vascular shunting vessels hepatic hemangioma. Further evaluation with MRI of the abdomen without and with contrast when the patient has    overcome acute illness. **This report has been created using voice recognition software. It may contain minor errors which are inherent in voice recognition technology. **      Final report electronically signed by Dr Shantell Christine on 12/8/2021 4:34 PM      XR CHEST PORTABLE   Final Result   1. Cardiomegaly. 2. Multifocal hazy bilateral infiltrates. This document has been electronically signed by: Sathya Estrada M.D. on    12/08/2021 05:48 AM      CTA CHEST W [address] [place] Temperature Management   Final Result       1.  No pulmonary emboli. 2. Diffuse groundglass opacity throughout both lungs with superimposed patchy areas of consolidation. **This report has been created using voice recognition software. It may contain minor errors which are inherent in voice recognition technology. **      Final report electronically signed by Dr. Miguel Vogt on 12/5/2021 8:07 AM      XR CHEST PORTABLE   Final Result   Moderate severity bilateral areas of opacification which are compatible with Covid 19. **This report has been created using voice recognition software. It may contain minor errors which are inherent in voice recognition technology. **      Final report electronically signed by Dr. Miguel Vogt on 12/5/2021 7:28 AM          DVT prophylaxis: [] Lovenox                                 [] SCDs                                 [] SQ Heparin                                 [] Encourage ambulation           [x] Already on Anticoagulation -Heparin gtt     Code Status: Full Code    PT/OT Eval Status:     Tele:   [x] yes             [] no    Electronically signed by Zia Rodriguez MD on 12/10/2021 at 7:40 AM

## 2021-12-10 NOTE — PROGRESS NOTES
Called and updated patient's daughter in [facility], and wife Uzma Shelton. Family is adamant that patient be given his home medication of Ivermectin today. Reinforced that we are unable to give without the order, and it will be addressed today with the day shift hospitalist. Family asking if they are able to visit, and visitor policy reinforced at this time, that patient is unable to have visitors due to being currently on [place].

## 2021-12-10 NOTE — CARE COORDINATION
Discharge Planning Update:    Hospitalist following. Continues to require bipap vs high flow. Currently on bipap 90% fio2. + PE. Heparin gtt. IV decadron. IV rocephin. IV lasix. Plans to return home with wife, has home o2 through [place] in [facility].

## 2021-12-10 NOTE — PROGRESS NOTES
This nurse talked with wife and daughter in law and updated them of what Dr. Amanda Colon stated this morning in regards to home medication. After talking to family this nurse talked to  patient and they stated  that they understood. This nurse reached out to  to see if he could call the wife Bam Brown directly. Dr. Amanda Colon stated that he will reach out to wife. Will continue to monitor.

## 2021-12-10 NOTE — SIGNIFICANT EVENT
Contacted by family with questions about patient course of care. Spoke with wife, Mino Encarnacion, over the phone and was given verbal permission to speak with daughter, Quyen Garcia. Called Clyde fox and explained her father's current status. Explained that COVID and PE are the two major contributors to condition and described current respiratory status. Briefly described current care plan, and directed back to attending provider team for further decisions depending on clinical course. Provided the perspective that patient has significant respiratory compromise, and that next few days could go in multiple directions as far as needed interventions. Clyde fox was appreciative of update. Provided office number should any further conversation be desired.       Dara Turcios MD

## 2021-12-10 NOTE — PROGRESS NOTES
Evaluated patient cost of Eliquis and Xarelto for [facility]. Patient initial cost of Eliquis is $454.46/month due to patient needing to meet their deductible. Once deductible is met, patient total would be $9.46/month. Xarelto is not on insurance formulary. -Anna Urena (ext. [phone]) 12/10/2021,3:40 PM

## 2021-12-10 NOTE — PROGRESS NOTES
Yamile[facility] 60  OCCUPATIONAL THERAPY MISSED TREATMENT NOTE  [facility] SURG 8B  8B-32/032-A      Date: 12/10/2021  Patient Name: Luciano Aranda        CSN: [id number]   : 1955  (77 y.o.)  Gender: male   Referring Practitioner: Dr. Ordaz Marrow  Diagnosis: COVID 19         REASON FOR MISSED TREATMENT: Hold Treatment per Nursing. RN reported repeat ABGs look worse, had to increase O2 on bipap. Not appropriate for therapy at this time.  Will check back as able

## 2021-12-10 NOTE — PROGRESS NOTES
Wife was on the unit this evening and this nurse updated them of the plan of care. The wife also was able to talk to Dr. Prakash Neville during this shift. Dr. Lalita Pedroza was able to talk with daughter Grday Fowler on this shift. This nurse will not call family at the end of shift unless the patient's condition change. Wife Young Soriano stated that they understand.

## 2021-12-11 NOTE — PROGRESS NOTES
Hospitalist Progress Note    Patient:  Luciano Poag      Unit/Bed:8B-32/032-A    YOB: 1955    MRN: [id number]       Acct: [de-identified]     PCP: Briseida Ashton    Date of Admission: 12/5/2021    Assessment/Plan:    1. Acute hypoxic respiratory failure 2/2 Novel [place] Coronavirus and PE -pt was maxed out on supplemental oxygen per HFNC, was switched to BiPAP on 12/08, doing ok, breathing unlabored for now. Patient on decadron 20, baricitinib DC'ed d/t PE, Pepcid, received short course of Zithromax and Rocephin. Initial pro-Fernie 1.06 and CRP 22.27, trending down. Clinically no DVT. Discussed potential need for intubation with potentially requiring trach, patient agreeable. 2. New-onset hemodynamically stable saddle PE -on 12/28 patient developed pleuritic CP while on Lovenox prophylaxis and baricitinib, D-dimer at that time 62, 822, elevated BNP. Received one-time Lasix. EKG without acute changes. BP stable and actually elevated. CTA chest 12/8 c/w saddle PE w/o significant RH strain. Notably, CTA chest 12/5 shows no PE. Echo shows no RH strain, EF 50%. D/w /FRANKY, recommended no mechanical thrombectomy at this time, continue anticoagulation. Initially was started on therapeutic dose of Lovenox, later transitioned to heparin gtt in a setting of possible intervention. If patient develops hemodynamic compromise, will need catheter-guided or systemic tPA or mechanical thrombectomy. Thus far, patient has been hemodynamically stable, no intervention was indicated, therefore switched back to therapeutic dose Lovenox. 3. Primary HTN -on Lopressor 50 mg p.o. twice daily, lisinopril 10mg PO qd. Poorly controlled, deferring aggressive treatment in the setting of PE.  4. Diuretic induced hyponatremia -  Trending. Patient had increased oxygen requirement therefore diuretics were started. We will continue to monitor and stop diuretics when indicated.   5. Liver nodule of unclear significance -noted on CTA chest 12/8: 1.4 x 1.3 cm enhancing nodule, possible hepatic hemangioma. Expected discharge date:  tbd    Disposition:    [x] Home       [] TCU       [] Rehab       [] Psych       [] SNF       [] [place]       [] Other-    Chief Complaint: [facility] [place] [facility] Course:   Patient was seen, examined and the medical chart was reviewed thoroughly today. In summary, 77 y.o.male admitted on 12/5/2021 for  Acute resp hypoxic resp failure, maxed out on supplemental Oxygen with HFNC and switched to BiPAP. On the third day of admission, patient developed PE while on prophylactic dose of Lovenox and while on treatment for COVID-19 with Decadron and baricitinib. Patient was not a candidate for mechanical thrombectomy and was continued on heparin gtt, then switched to Lovenox. Subjective (past 24 hours):   Patient was seen and examined, resting comfortably in bed. No new complaints. Denies hemoptysis, melena, hematochezia, headache, chest pain, fever, diarrhea. ROS (12 point review of systems completed. Pertinent positives noted. Otherwise ROS is negative).     Medications:  Reviewed    Infusion Medications    dextrose 5% in lactated ringers 75 mL/hr at 12/11/21 0801     Scheduled Medications    [START ON 12/12/2021] lisinopril  20 mg Oral Daily    enoxaparin  1 mg/kg SubCUTAneous Q12H    busPIRone  10 mg Oral TID    famotidine  20 mg Oral BID    furosemide  20 mg IntraVENous Daily    metoprolol tartrate  50 mg Oral BID    Vitamin D  2,000 Units Oral Daily    ascorbic acid  1,000 mg Oral Daily    zinc sulfate  50 mg Oral Daily     PRN Meds: heparin (porcine), heparin (porcine), LORazepam, melatonin, diphenhydrAMINE, benzonatate, guaiFENesin-dextromethorphan, acetaminophen **OR** acetaminophen, ondansetron      Intake/Output Summary (Last 24 hours) at 12/11/2021 1437  Last data filed at 12/11/2021 1219  Gross per 24 hour   Intake 830 ml   Output 1025 ml   Net -195 ml Diet:  ADULT DIET; Regular; No Added Salt (3-4 gm)  ADULT ORAL NUTRITION SUPPLEMENT; Breakfast, Lunch, Dinner; Standard High Calorie/High Protein Oral Supplement    Exam:  /72   Pulse 83   Temp 97.6 °F (36.4 °C) (Oral)   Resp 21   Ht 6' 1\" (1.854 m)   Wt 241 lb 2.9 oz (109.4 kg)   SpO2 93%   BMI 31.82 kg/m²     General appearance: No apparent distress, appears stated age and cooperative. HEENT: Pupils equal, round, and reactive to light. Conjunctivae/corneas clear. Neck: Supple, with full range of motion. No jugular venous distention. Trachea midline. Respiratory: Breathing unlabored. Breath sounds diminished at bases, diffuse crackles lower lung fields. Cardiovascular: Regular rate and rhythm with normal S1/S2 without murmurs, rubs or gallops. Abdomen: Soft, non-tender, non-distended with normal bowel sounds. Musculoskeletal: passive and active ROM x 4 extremities. Skin: Skin color, texture, turgor normal.  No rashes or lesions. Neurologic:  Neurovascularly intact without any focal sensory/motor deficits. Cranial nerves: II-XII intact, grossly non-focal.  Psychiatric: Alert and oriented, thought content appropriate, normal insight  Capillary Refill: Brisk,< 3 seconds   Peripheral Pulses: +2 palpable, equal bilaterally       Labs:   Recent Labs     12/09/21  0227 12/10/21  0711   WBC 10.3 12.4*   HGB 13.8* 13.8*   HCT 39.2* 41.1*    309     Recent Labs     12/09/21  2017 12/10/21  0711 12/11/21  0639   * 132* 133*   K 4.2 4.1 4.1   CL 93* 97* 97*   CO2 23 22* 23   BUN 28* 29* 35*   CREATININE 1.0 0.9 1.0   CALCIUM 9.1 8.8 8.9     Recent Labs     12/11/21  0639   AST 37   ALT 20   BILIDIR 0.3   BILITOT 0.9   ALKPHOS 168*     Recent Labs     12/08/21  1731   INR 1.31*     No results for input(s): Jany Light in the last 72 hours.     Microbiology:      Urinalysis:    No results found for: Zimmerman Russel, BACTERIA, RBCUA, BLOODU, [address], [address]    Radiology:  XR CHEST PORTABLE   Final Result   1. Normal heart size. No effusion. 2. Moderate groundglass infiltrates scattered in both lungs diffusely, consistent with interstitial pneumonia/edema. 3. Overall appearance of chest slightly worse than prior. **This report has been created using voice recognition software. It may contain minor errors which are inherent in voice recognition technology. **      Final report electronically signed by Dr. Judy Boss on 12/9/2021 10:21 AM      CTA CHEST [address]   Final Result      1. A saddle pulmonary embolus is seen that extends predominantly into the left pulmonary arterial system involving the left main pulmonary artery, the left upper lobe pulmonary artery, the left upper lobe segmental and subsegmental pulmonary arteries. There is suspicion for right heart strain. Echocardiogram should be obtained to confirm the right at strain. The critical  finding(s) was called to Dr. Wendi Maldonado at [phone] hours on 12/8/2021 by Dr. Carmel Frye. Verbal acknowledgment and readback was given. 2. Diffuse groundglass opacification of both lungs with patchy areas of consolidation noted. Findings relate to multilobar pneumonia as can be seen with Covid-19 infection. 3. Very small bilateral pleural effusions are seen. 4. A 1.4 x 1.3 cm enhancing nodule in segment 5 of the liver is not well characterized on this study. May represent a vascular shunting vessels hepatic hemangioma. Further evaluation with MRI of the abdomen without and with contrast when the patient has    overcome acute illness. **This report has been created using voice recognition software. It may contain minor errors which are inherent in voice recognition technology. **      Final report electronically signed by Dr Chayito Prasad on 12/8/2021 4:34 PM      XR CHEST PORTABLE   Final Result   1. Cardiomegaly. 2. Multifocal hazy bilateral infiltrates.       This document has been electronically signed by: Samantha Crowley M.D. on    12/08/2021 05:48 AM      CTA CHEST [address]   Final Result       1. No pulmonary emboli. 2. Diffuse groundglass opacity throughout both lungs with superimposed patchy areas of consolidation. **This report has been created using voice recognition software. It may contain minor errors which are inherent in voice recognition technology. **      Final report electronically signed by Dr. Wayne Been on 12/5/2021 8:07 AM      XR CHEST PORTABLE   Final Result   Moderate severity bilateral areas of opacification which are compatible with Covid 19. **This report has been created using voice recognition software. It may contain minor errors which are inherent in voice recognition technology. **      Final report electronically signed by Dr. Wayne Been on 12/5/2021 7:28 AM      XR CHEST PORTABLE    (Results Pending)       DVT prophylaxis: [] Lovenox                                 [] SCDs                                 [] SQ Heparin                                 [] Encourage ambulation           [x] Already on Anticoagulation -therapeutic dose Lovenox     Code Status: Full Code    PT/OT Eval Status:     Tele:   [x] yes             [] no    Electronically signed by Min Ramirez MD on 12/11/2021 at 2:37 PM

## 2021-12-11 NOTE — PROGRESS NOTES
Patient's wife Mabel Oneil and daughter in law Nicky Dennis updated on plan of care and patient condition. Addressed all questions and concerns.

## 2021-12-12 NOTE — PROGRESS NOTES
Patient's wife and son attempted to visit patient. They went into the room without checking in at the nurses station. This RN saw them and explained that they are not able to visit with patient at bedside and the son said \"well that's fine, but we're going to see him anyways. \" This RN explained that we can help facilitate a zoom call and they can wave from the door but they are not permitted to visit at bedside unless it's been approved for special circumstances. They were persistent about visiting so I notified Charge RN, Shlomo Ashby, and she explained same rules to them regarding visitation.

## 2021-12-12 NOTE — PROGRESS NOTES
RN notified Dr. Margy Franklin via Trutap that patient's labs have resulted for today as requested by nursing communication.

## 2021-12-12 NOTE — PROGRESS NOTES
Patient's wife [facility] and daughter in law Isaías Bailey updated on plan of care.  Addressed all questions and concerns

## 2021-12-13 NOTE — PROGRESS NOTES
Update called to wife Lord Whitaker and daughter in law Taj Carrillo. Addressed all questions and concerns.

## 2021-12-13 NOTE — PROGRESS NOTES
[facility]  INPATIENT PHYSICAL THERAPY  DAILY NOTE  [facility] SURG 8B - 8B-32/032-A  Time In: 8513  Time Out: 3298  Timed Code Treatment Minutes: 26 Minutes  Minutes: 26          Date: 2021  Patient Name: Greg Ness,  Gender:  male        MRN: [id number]  : 1955  (77 y.o.)     Referring Practitioner: Gulshan Howe DO  Diagnosis: [id number]  Additional Pertinent Hx: In summary, 77 y.o.male admitted on 2021 for  Acute resp hypoxic resp failure, maxed out on supplemental Oxygen with HFNC and switched to BiPAP. On the third day of admission, patient developed PE while on prophylactic dose of Lovenox and while on treatment for COVID-19 was Decadron and baricitinib. Patient was not a candidate for mechanical thrombectomy and was continued on heparin gtt. Prior Level of Function:  Lives With: Spouse  Type of Home: House  Home Layout: One level  Home Access: Stairs to enter with rails  Entrance Stairs - Number of Steps: 2  Home Equipment:  (no equipment)   Bathroom Shower/Tub: Tub/Shower unit  Bathroom Toilet: Standard  Bathroom Accessibility: Accessible    ADL Assistance: Independent  Homemaking Assistance: Independent  Ambulation Assistance: Independent  Transfer Assistance: Independent  Active : Yes  Additional Comments: Pt stating her was indep PTA    Restrictions/Precautions:  Restrictions/Precautions: General Precautions, Fall Risk, Isolation  Position Activity Restriction  Other position/activity restrictions: droplet +, COVID +     SUBJECTIVE: Ok to see pt per nursing. Pt in bed when therapy arrived, pleasant and agreeable to PT session at this time with telesitter present. Nursing aware of O2 stats during session.      PAIN: denies    Vitals: Oxygen: BIPAP, 95% FiO2, dropped to 80-86% with exercises, required about 3-4 min to recover between 88-91%  Heart Rate: 100-120s during exercises  Pt educated on proper breathing mechanics during session, good demo  OBJECTIVE:  Bed Mobility:  Not Tested  Not safe to complete mobility at this time due to low O2  Transfers:  Not Tested  Not safe to complete mobility at this time due to low O2  Ambulation:  Not Tested  Not safe to complete mobility at this time due to low O2    Exercise:  Patient was guided in 1 set(s) 15 reps of exercise to both lower extremities. Ankle pumps, Glut sets, Quad sets, Heelslides and Hip abduction/adduction. Exercises were completed for increased independence with functional mobility. Pt required frequent rest breaks during exercises due to low O2 sats and increased time for recovery. Pt educated on proper breathing  with good demo    Functional Outcome Measures: Not completed       ASSESSMENT:  Assessment: limited at this time due to low O2 sats  Activity Tolerance:  Patient tolerance of  treatment: poor.  Low O2 sats and increased HR, increased time for recovery     Equipment Recommendations:   Discharge Recommendations: Continue to assess pending progress and Patient would benefit from continued PT at discharge  Plan: Times per week: 5x C  Times per day: Daily  Current Treatment Recommendations: Strengthening, Patient/Caregiver Education & Training, Equipment Evaluation, Education, & procurement, Balance Training, Functional Mobility Training, Safety Education & Training, Stair training, Home Exercise Program, Gait Training, Transfer Training    Patient Education  Patient Education: Home Exercise Program, Precautions/Restrictions, proper breathing mechanics,  - Patient Verbalized Understanding, - Patient Requires Continued Education    Goals:  Patient goals : none stated  Short term goals  Time Frame for Short term goals: by discharge  Short term goal 1: bed mobility with HOB flat, no rails, mod I for increased functional ind  Short term goal 2: sit<>Stand from various surfaces with LRD Mod I for safe transfers  Short term goal 3: ambulate 48' with LRD mod I and O2 sats >90% for safe household

## 2021-12-13 NOTE — PROGRESS NOTES
Per family request to day shift, attempted to prone patient. Patient unable to lay on stomach. Patient was able to lay on left side, and positioned with pillows. Will continue to turn and reposition patient as seen in flow sheet.

## 2021-12-13 NOTE — PROGRESS NOTES
Hospitalist Progress Note    Patient:  Roxana Infante      Unit/Bed:8B-32/032-A    YOB: 1955    MRN: [id number]       Acct: [de-identified]     PCP: Joss Sin    Date of Admission: 12/5/2021    Assessment/Plan:    1. Acute hypoxic respiratory failure 2/2 Novel [place] Coronavirus and PE -pt was maxed out on supplemental oxygen per HFNC, was switched to BiPAP on 12/08, doing ok, breathing unlabored for now. Continues to have high oxygen requirements on BiPAP, unable to wean at this time, desaturates while working w/ physical therapy. Patient was on decadron 20mg which now titrated down to 10mg daily, baricitinib DC'ed d/t PE, Pepcid, received short course of Zithromax and Rocephin. Initial pro-Fernie 1.06 and CRP 22.27, trending down. Clinically no DVT. Discussed potential need for intubation with potentially requiring trach, patient agreeable. 2. New-onset hemodynamically stable saddle PE -on 12/28 patient developed pleuritic CP while on Lovenox prophylaxis and baricitinib, D-dimer at that time 62, 822, elevated BNP. Received one-time Lasix. EKG without acute changes. BP stable and actually elevated. CTA chest 12/8 c/w saddle PE w/o significant RH strain. Notably, CTA chest 12/5 shows no PE. Echo shows no RH strain, EF 50%. D/w /FRANKY, recommended no mechanical thrombectomy at this time, continue anticoagulation. Initially was started on therapeutic dose of Lovenox, later transitioned to heparin gtt in a setting of possible intervention. If patient develops hemodynamic compromise, will need catheter-guided or systemic tPA or mechanical thrombectomy. Thus far, patient has been hemodynamically stable, no intervention was indicated, therefore switched back to therapeutic dose Lovenox. 3. Primary HTN -on Lopressor 50 mg p.o. twice daily, lisinopril 10mg PO qd. Poorly controlled, deferring aggressive treatment in the setting of PE.  4. Diuretic induced hyponatremia -  Trending.   Patient had increased oxygen requirement therefore diuretics were started. We will continue to monitor and stop diuretics when indicated. 5. Liver nodule of unclear significance -noted on CTA chest 12/8: 1.4 x 1.3 cm enhancing nodule, possible hepatic hemangioma. Expected discharge date:  tbd    Disposition:    [x] Home       [] TCU       [] Rehab       [] Psych       [] SNF       [] [place]       [] Other-    Chief Complaint: [facility] Course:   Patient was seen, examined and the medical chart was reviewed thoroughly today. In summary, 77 y.o.male admitted on 12/5/2021 for  Acute resp hypoxic resp failure, maxed out on supplemental Oxygen with HFNC and switched to BiPAP. On the third day of admission, patient developed PE while on prophylactic dose of Lovenox and while on treatment for COVID-19 with Decadron and baricitinib. Patient was not a candidate for mechanical thrombectomy and was continued on heparin gtt, then switched to Lovenox. Subjective (past 24 hours):   Patient was seen and examined, resting comfortably in bed. No new complaints, no events overnight. Denies chest pain, headache, nausea vomiting diarrhea. No bleeding    ROS (12 point review of systems completed. Pertinent positives noted. Otherwise ROS is negative).     Medications:  Reviewed    Infusion Medications    dextrose 5% in lactated ringers 50 mL/hr at 12/13/21 0620     Scheduled Medications    metoprolol tartrate  25 mg Oral BID    dexamethasone  10 mg Oral Daily    losartan  25 mg Oral Daily    enoxaparin  1 mg/kg SubCUTAneous Q12H    busPIRone  10 mg Oral TID    famotidine  20 mg Oral BID    Vitamin D  2,000 Units Oral Daily    ascorbic acid  1,000 mg Oral Daily    zinc sulfate  50 mg Oral Daily     PRN Meds: melatonin, heparin (porcine), heparin (porcine), LORazepam, diphenhydrAMINE, benzonatate, guaiFENesin-dextromethorphan, acetaminophen **OR** acetaminophen, ondansetron      Intake/Output Summary (Last 24 hours) at 12/13/2021 1339  Last data filed at 12/13/2021 1229  Gross per 24 hour   Intake 740 ml   Output 1875 ml   Net -1135 ml       Diet:  ADULT DIET; Regular; No Added Salt (3-4 gm)  ADULT ORAL NUTRITION SUPPLEMENT; Breakfast, Lunch, Dinner; Standard High Calorie/High Protein Oral Supplement    Exam:  BP (!) 115/58   Pulse 93   Temp 97.8 °F (36.6 °C) (Axillary)   Resp 25   Ht 6' 1\" (1.854 m)   Wt 241 lb 2.9 oz (109.4 kg)   SpO2 90%   BMI 31.82 kg/m²     General appearance: No apparent distress, appears stated age and cooperative. HEENT: Pupils equal, round, and reactive to light. Conjunctivae/corneas clear. Neck: Supple, with full range of motion. No jugular venous distention. Trachea midline. Respiratory: Breathing unlabored. Breath sounds diminished at bases, diffuse crackles lower lung fields. Cardiovascular: Regular rate and rhythm with normal S1/S2 without murmurs, rubs or gallops. Abdomen: Soft, non-tender, non-distended with normal bowel sounds. Musculoskeletal: passive and active ROM x 4 extremities. Skin: Skin color, texture, turgor normal.  No rashes or lesions. Neurologic:  Neurovascularly intact without any focal sensory/motor deficits. Cranial nerves: II-XII intact, grossly non-focal.  Psychiatric: Alert and oriented, thought content appropriate, normal insight  Capillary Refill: Brisk,< 3 seconds   Peripheral Pulses: +2 palpable, equal bilaterally       Labs:   Recent Labs     12/12/21  0631   WBC 15.6*   HGB 14.8   HCT 43.1        Recent Labs     12/11/21  0639 12/12/21  0631   * 135   K 4.1 4.2   CL 97* 97*   CO2 23 23   BUN 35* 31*   CREATININE 1.0 1.0   CALCIUM 8.9 8.8     Recent Labs     12/11/21  0639   AST 37   ALT 20   BILIDIR 0.3   BILITOT 0.9   ALKPHOS 168*     No results for input(s): INR in the last 72 hours. No results for input(s): [place] in the last 72 hours.     Microbiology:      Urinalysis:    No results found for: Nikhil Claros Heather Myers, RBCUA, BLOODU, SPECGRAV, Erica [address]    Radiology:  XR CHEST PORTABLE   Final Result   Impression:   Bilateral patchy infiltrates, similar to prior study. This document has been electronically signed by: Alejandra Medrano MD on    12/12/2021 03:41 AM      XR CHEST PORTABLE   Final Result   1. Normal heart size. No effusion. 2. Moderate groundglass infiltrates scattered in both lungs diffusely, consistent with interstitial pneumonia/edema. 3. Overall appearance of chest slightly worse than prior. **This report has been created using voice recognition software. It may contain minor errors which are inherent in voice recognition technology. **      Final report electronically signed by Dr. Cassandra Yao on 12/9/2021 10:21 AM      CTA CHEST [address]   Final Result      1. A saddle pulmonary embolus is seen that extends predominantly into the left pulmonary arterial system involving the left main pulmonary artery, the left upper lobe pulmonary artery, the left upper lobe segmental and subsegmental pulmonary arteries. There is suspicion for right heart strain. Echocardiogram should be obtained to confirm the right at strain. The critical  finding(s) was called to Dr. Cathy Kawasaki at [phone] hours on 12/8/2021 by Dr. Donny James. Verbal acknowledgment and readback was given. 2. Diffuse groundglass opacification of both lungs with patchy areas of consolidation noted. Findings relate to multilobar pneumonia as can be seen with Covid-19 infection. 3. Very small bilateral pleural effusions are seen. 4. A 1.4 x 1.3 cm enhancing nodule in segment 5 of the liver is not well characterized on this study. May represent a vascular shunting vessels hepatic hemangioma. Further evaluation with MRI of the abdomen without and with contrast when the patient has    overcome acute illness. **This report has been created using voice recognition software.   It may contain minor errors which are inherent in voice recognition technology. **      Final report electronically signed by Dr Alyssia Garza on 12/8/2021 4:34 PM      XR CHEST PORTABLE   Final Result   1. Cardiomegaly. 2. Multifocal hazy bilateral infiltrates. This document has been electronically signed by: Raudel Pinzon M.D. on    12/08/2021 05:48 AM      CTA CHEST [address]   Final Result       1. No pulmonary emboli. 2. Diffuse groundglass opacity throughout both lungs with superimposed patchy areas of consolidation. **This report has been created using voice recognition software. It may contain minor errors which are inherent in voice recognition technology. **      Final report electronically signed by Dr. Aida Jackson on 12/5/2021 8:07 AM      XR CHEST PORTABLE   Final Result   Moderate severity bilateral areas of opacification which are compatible with Covid 19. **This report has been created using voice recognition software. It may contain minor errors which are inherent in voice recognition technology. **      Final report electronically signed by Dr. Aida Jackson on 12/5/2021 7:28 AM          DVT prophylaxis: [] Lovenox                                 [] SCDs                                 [] SQ Heparin                                 [] Encourage ambulation           [x] Already on Anticoagulation -therapeutic dose Lovenox     Code Status: Full Code    PT/OT Eval Status:     Tele:   [x] yes             [] no    Electronically signed by Michelle Montez MD on 12/13/2021 at 1:39 PM

## 2021-12-13 NOTE — FLOWSHEET NOTE
Wife Sixto Vaughan called and updated, Son came in on phone call and updated as well. All questions answered.

## 2021-12-14 NOTE — PROGRESS NOTES
3979 Subha Pereyra NP, RN, RT at bedside for intubation    0912 100mg ketamine given   4mg midazolam given    0913 Propofol gtt started at 40mcg/kg/min    0915 Fentanyl gtt initiated   50 of propofol given    0916 50mcg Fentanyl given    0918 Size 8, 24ATL   Bilateral breath sounds auscultated  bilaterally, positive color change,    Chest x-ray ordered to confirm  Placement    /73  SPO2 72

## 2021-12-14 NOTE — PROGRESS NOTES
[facility] [address]  PHYSICAL THERAPY MISSED TREATMENT NOTE  [facility] CV[facility] 4B    Date: 2021  Patient Name: Rene Reyes        MRN: [id number]   : 1955  (77 y.o.)  Gender: male   Referring Practitioner: Trudy Griffin DO  Diagnosis: COVID-19         REASON FOR MISSED TREATMENT:  Hold treatment per nursing request.  Attempted to see pt in early AM. Per nursing, pt having trouble breathing on BIPAP, planning on intubation, will hold for today.      Jerica Barnett PT, DPT

## 2021-12-14 NOTE — CONSULTS
[facility]      Patient - Marisa Sharma   MRN -  [id number]   [facility] # - [de-identified]   - 1955      Date of Admission -  2021  6:18 AM  Date of evaluation -  2021  Room - -32/[facility]   Hospital Day - Via iK Dubon MD Primary Care Physician - Felicity Grace     Problem List      Active Hospital Problems    Diagnosis Date Noted    Hypertension [I10]     Acute respiratory failure with hypoxia ([address] [J96.01]     Community acquired pneumonia [J18.9]     Acute saddle pulmonary embolism without acute cor pulmonale ([address] [I26.92]     COVID-19 [U07.1] 2021     Reason for Consult    \"covid, many days on bipap; any suggestions? \"  HPI   History Obtained From: Patient and electronic medical record. Marisa Sharma is a 77 y.o. male who presents w/ acute hypoxic respiratory failure secondary to COVID-19 pneumonia and saddle PE. H/o HTN on metoprolol tartrate only, and obesity (BMI 31.89 kg/m2). Diagnosed with COVID-19 @ [facility] on  after symptoms started on  (headache, sore throat, cough, loss of taste/smell, generalized body aches, dizziness, SOB, and intermittent chest tightness/wheezing) and was supposedly exhibiting signs of acute hypoxic respiratory failure requiring oxygen supplementation (baseline room air) however patient was subsequently discharged. He presented to [facility] ED on  and was admitted requiring 6L NC to maintain oxygen saturation >90%. Pt was initially mildly hyponatremic, ferritin 4,091, CRP 22.27, lactate 2.1, procal 1.06, and . 2021 concern for PE, D-Dimer 62,822.00 ng/mL and CTA PE demonstrated saddle PE w/ c/f RV strain however echo w/o RV strain and pt started on lovenox for anticoagulation. Also empirically started on course of IV azithromycin - and IV CTX -. Pt has had intermittent mild leukocytosis but remained afebrile throughout hospitalization.      He is Occupational History    Not on file   Tobacco Use    Smoking status: Never Smoker    Smokeless tobacco: Never Used   Substance and Sexual Activity    Alcohol use: Not Currently    Drug use: Never    Sexual activity: Not on file   Other Topics Concern    Not on file   Social History Narrative    Not on file     Social Determinants of Health     Financial Resource Strain:     Difficulty of Paying Living Expenses: Not on file   Food Insecurity:     Worried About Running Out of Food in the Last Year: Not on file    [place] of Food in the Last Year: Not on file   Transportation Needs:     Lack of Transportation (Medical): Not on file    Lack of Transportation (Non-Medical): Not on file   Physical Activity:     Days of Exercise per Week: Not on file    Minutes of Exercise per Session: Not on file   Stress:     Feeling of Stress : Not on file   Social Connections:     Frequency of Communication with Friends and Family: Not on file    Frequency of Social Gatherings with Friends and Family: Not on file    Attends Druze Services: Not on file    Active Member of [address] [place] [address] or Organizations: Not on file    Attends Club or Organization Meetings: Not on file    Marital Status: Not on file   Intimate Partner Violence:     Fear of Current or Ex-Partner: Not on file    Emotionally Abused: Not on file    Physically Abused: Not on file    Sexually Abused: Not on file   Housing Stability:     Unable to Pay for Housing in the Last Year: Not on file    Number of [place] in the Last Year: Not on file    Unstable Housing in the Last Year: Not on file     Family History    History reviewed. No pertinent family history. Sleep History    Never diagnosed with sleep apnea in the past.  Occupational history   Occupation:  He is current working: No  Type of profession: retired.                      History of tobacco smoking:No  .  History of recreational or IV drug use in the past:NO     History of exposure to coal mines/coal dust: NO  History of exposure to foundry dust/welding: NO  History of exposure to quarry/silica/sandblasting: NO  History of exposure to asbestos/working with breaks/ships: NO  History of exposure to farm dust: NO  History of recent travel to long distances: NO  History of exposure to birds, pigeons, or chickens in the past:NO  Pet animals at home:No  Dogs: 0  Cats: 0    History of pulmonary embolism in the past: Yes            History of DVT in the past:No        [] Right lower extremity   [] Left lower extremity. [place] of systems   Review of Systems   Constitutional: Positive for activity change (decreased) and fatigue (decreased). Negative for chills and fever. HENT: Negative for rhinorrhea, sinus pain, sore throat and voice change. Eyes: Negative for photophobia and visual disturbance. Respiratory: Positive for shortness of breath and wheezing. Negative for apnea and cough. Cardiovascular: Positive for chest pain. Negative for palpitations. Gastrointestinal: Negative for blood in stool, constipation, diarrhea, nausea and vomiting. Genitourinary: Negative for dysuria, frequency and hematuria. Neurological: Negative for dizziness and headaches. Psychiatric/Behavioral: Negative for confusion. Vitals     height is 6' 1\" (1.854 m) and weight is 241 lb 11.2 oz (109.6 kg). His axillary temperature is 99.8 °F (37.7 °C). His blood pressure is 179/97 (abnormal) and his pulse is 132. His respiration is 32 (abnormal) and oxygen saturation is 87% (abnormal). Body mass index is 31.89 kg/m². SUPPLEMENTAL O2: O2 Flow Rate (L/min): 60 L/min     I/O        Intake/Output Summary (Last 24 hours) at 12/14/2021 0900  Last data filed at 12/14/2021 0300  Gross per 24 hour   Intake 9358.72 ml   Output 1475 ml   Net 7883.72 ml     I/O last 3 completed shifts: In: 9358.7 [P.O.:1300;  I.V.:8058.7]  Out: 5985 [[id number]]   Patient Vitals for the past 96 hrs (Last 3 readings):   Weight 12/13/21 1930 241 lb 11.2 oz (109.6 kg)   12/13/21 1639 241 lb 11.2 oz (109.6 kg)       Exam   Nursing note and vitals reviewed  Physical Exam  Constitutional:       Appearance: Normal appearance. He is obese. He is not diaphoretic. Interventions: He is intubated. HENT:      Head: Normocephalic and atraumatic. Mouth/Throat:      Mouth: Mucous membranes are moist.      Pharynx: Oropharynx is clear. No oropharyngeal exudate or posterior oropharyngeal erythema. Eyes:      General: No scleral icterus. Extraocular Movements: Extraocular movements intact. Pupils: Pupils are equal, round, and reactive to light. Cardiovascular:      Rate and Rhythm: Normal rate and regular rhythm. Pulses: Normal pulses. Heart sounds: Normal heart sounds. No murmur heard. No friction rub. No gallop. Pulmonary:      Effort: Tachypnea and accessory muscle usage present. He is intubated. Breath sounds: Examination of the right-upper field reveals decreased breath sounds. Examination of the left-upper field reveals decreased breath sounds. Examination of the right-middle field reveals decreased breath sounds. Examination of the left-middle field reveals decreased breath sounds. Examination of the right-lower field reveals decreased breath sounds, wheezing and rales. Examination of the left-lower field reveals decreased breath sounds, wheezing and rales. Decreased breath sounds, wheezing and rales present. No rhonchi. Abdominal:      General: Bowel sounds are normal.      Palpations: Abdomen is soft. Tenderness: There is no abdominal tenderness. There is no guarding or rebound. Musculoskeletal:      Cervical back: Normal range of motion and neck supple. Right lower leg: No edema. Left lower leg: No edema. Skin:     General: Skin is warm and dry. Capillary Refill: Capillary refill takes less than 2 seconds. Neurological:      General: No focal deficit present.       Mental Status: He is alert and oriented to person, place, and time. Psychiatric:         Mood and Affect: Mood normal.         Behavior: Behavior normal.         Labs  - Old records and notes have been reviewed in [facility]   ABG  Lab Results   Component Value Date    PH 7.52 12/10/2021    PO2 54 12/10/2021    PCO2 30 12/10/2021    HCO3 25 12/10/2021    O2SAT 91 12/10/2021     Lab Results   Component Value Date    IFIO2 80 12/10/2021    MODE BiLevel 12/07/2021    SETPEEP 6.0 12/10/2021     CBC  Recent Labs     12/12/21  0631   WBC 15.6*   RBC 4.83   HGB 14.8   HCT 43.1   MCV 89.2   MCH 30.6   MCHC 34.3      MPV 10.3      BMP  Recent Labs     12/12/21  0631      K 4.2   CL 97*   CO2 23   BUN 31*   CREATININE 1.0   GLUCOSE 148*   MG 2.3   CALCIUM 8.8     LFT  No results for input(s): AST, ALT, ALB, BILITOT, ALKPHOS, LIPASE in the last 72 hours. Invalid input(s): AMYLASE  TROP  Lab Results   Component Value Date    TROPONINT < 0.010 12/08/2021    TROPONINT < 0.010 12/05/2021     BNP  No results for input(s): BNP in the last 72 hours. Lactic Acid  No results for input(s): LACTA in the last 72 hours. INR  No results for input(s): INR, PROTIME in the last 72 hours. PTT  No results for input(s): APTT in the last 72 hours. Glucose  No results for input(s): POCGLU in the last 72 hours. UA No results for input(s): SPECGRAV, PHUR, COLORU, CLARITYU, MUCUS, PROTEINU, BLOODU, RBCUA, WBCUA, BACTERIA, NITRU, GLUCOSEU, BILIRUBINUR, UROBILINOGEN, KETUA, LABCAST, LABCASTTY, AMORPHOS in the last 72 hours. Invalid input(s): CRYSTALS    PFTs   No PFTs on chart    Sleep studies   No Sleep study on chart    Cultures    COVID-19 positive    EKG       Echocardiogram   12/08/2021   Findings      Mitral Valve   The mitral valve structure was normal with normal leaflet separation. DOPPLER: The transmitral velocity was within the normal range with no   evidence for mitral stenosis.  There was no evidence of mitral regurgitation. Aortic Valve   The aortic valve was trileaflet with normal thickness and cuspal   separation. DOPPLER: Transaortic velocity was within the normal range with   no evidence of aortic stenosis. There was no evidence of aortic   regurgitation. Tricuspid Valve   The tricuspid valve structure was normal with normal leaflet separation. DOPPLER: There was no evidence of tricuspid stenosis. Mild tricuspid   regurgitation visualized. Pulmonic Valve   The pulmonic valve leaflets exhibited normal thickness, no calcification,   and normal cuspal separation. DOPPLER: The transpulmonic velocity was   within the normal range with no evidence for regurgitation. Left Atrium   Left atrial size was normal.      Left Ventricle   Normal left ventricle size and Low Normal LV systolic function. Ejection   fraction was estimated at 50 %. There were no regional left ventricular   wall motion abnormalities and wall thickness was within normal limits. Right Atrium   Right atrial size was normal.      Right Ventricle   The right ventricular size was normal with normal systolic function and   wall thickness. Pericardial Effusion   The pericardium was normal in appearance with no evidence of a pericardial   effusion. Pleural Effusion   No evidence of pleural effusion. Aorta / Great Vessels   -Aortic root dimension within normal limits.   -The Pulmonary artery is within normal limits. -IVC size is within normal limits with normal respiratory phasic changes. Radiology    CXR  12/14/2021    Bilateral pulmonary opacification consistent with involvement by COVID-19 infection. Borderline cardiomegaly. CT Scans    Saddle pulmonary embolus is seen that extends predominantly into the left pulmonary arterial system involving the left main pulmonary artery, the left upper lobe pulmonary artery, the left upper lobe segmental and subsegmental pulmonary arteries.      Assessment   Severe acute hypoxic respiratory failure 2/2 COVID-19 PNA - pt is maximized on BiPAP settings and continues to exhibit tachypnea. Plan   Recommend intubation and ICU transfer. Discussed with patient, he is amenable to this course of action. Transfer to . \"Thank you for asking us to see this patient\"    Questions and concerns addressed.     Electronically signed by   Marce Sampson MD on 12/14/2021 at 9:00 AM

## 2021-12-14 NOTE — PROGRESS NOTES
Hospitalist Progress Note    Patient:  John Crawford      Unit/Bed:8B-32/032-A    YOB: 1955    MRN: [id number]       Acct: [de-identified]     PCP: Ade Luke    Date of Admission: 12/5/2021    Assessment/Plan:    1. Acute hypoxic respiratory failure 2/2 Novel [place] Coronavirus and PE -pt was maxed out on supplemental oxygen per HFNC, was switched to BiPAP on 12/08, doing ok, breathing unlabored for now. Continues to have high oxygen requirements on BiPAP, unable to wean at this time, desaturates while working w/ physical therapy. Patient was on decadron 20mg which now titrated down to 10mg daily, baricitinib DC'ed d/t PE, Pepcid, received short course of Zithromax and Rocephin. Initial pro-Fernie 1.06 and CRP 22.27, trending down. Clinically no DVT. Discussed potential need for intubation with potentially requiring trach, patient agreeable. · 12/14: Guarded prognosis, maxed out O2 on BiPAP, failed brief HFNC on 12/12. Attempted proning yesterday however patient desaturated and unable to tolerate it. Would also recommend right lateral decubitus positioning to optimize V/Q mismatch in the setting of PE. Gave one-time IV Lasix 40 mg, repeat CXR c/w cardiomegaly, will schedule diuretics daily. Daily BMP and mag. 2. New-onset hemodynamically stable saddle PE -on 12/28 patient developed pleuritic CP while on Lovenox prophylaxis and baricitinib, D-dimer at that time 62, 822, elevated BNP. Received one-time Lasix. EKG without acute changes. BP stable and actually elevated. CTA chest 12/8 c/w saddle PE w/o significant RH strain. Notably, CTA chest 12/5 shows no PE. Echo shows no RH strain, EF 50%. D/w /FRANKY, recommended no mechanical thrombectomy at this time, continue anticoagulation. Initially was started on therapeutic dose of Lovenox, later transitioned to heparin gtt in a setting of possible intervention.  If patient develops hemodynamic compromise, will need catheter-guided or systemic tPA or mechanical thrombectomy. Thus far, patient has been hemodynamically stable, no intervention was indicated, therefore switched back to therapeutic dose Lovenox. 3. Primary HTN -on Lopressor 50 mg p.o. twice daily, lisinopril 10mg PO qd. Poorly controlled, deferring aggressive treatment in the setting of PE.  4. Diuretic induced hyponatremia -  Trending. Patient had increased oxygen requirement therefore diuretics were started. We will continue to monitor and stop diuretics when indicated. 5. Liver nodule of unclear significance -noted on CTA chest 12/8: 1.4 x 1.3 cm enhancing nodule, possible hepatic hemangioma. Expected discharge date:  tbd    Disposition:    [x] Home       [] TCU       [] Rehab       [] Psych       [] SNF       [] [place]       [] Other-    Chief Complaint: [facility] Course:   Patient was seen, examined and the medical chart was reviewed thoroughly today. In summary, 77 y.o.male admitted on 12/5/2021 for  Acute resp hypoxic resp failure, maxed out on supplemental Oxygen with HFNC and switched to BiPAP. On the third day of admission, patient developed PE while on prophylactic dose of Lovenox and while on treatment for COVID-19 with Decadron and baricitinib. Patient was not a candidate for mechanical thrombectomy and was continued on heparin gtt, then switched to Lovenox. Subjective (past 24 hours):   Patient was seen and examined, resting comfortably in bed. No new complaints, no events overnight. Denies chest pain, headache, nausea vomiting diarrhea. No bleeding    ROS (12 point review of systems completed. Pertinent positives noted. Otherwise ROS is negative).     Medications:  Reviewed    Infusion Medications    dextrose 5% in lactated ringers 50 mL/hr at 12/14/21 0300     Scheduled Medications    metoprolol tartrate  25 mg Oral BID    dexamethasone  10 mg Oral Daily    losartan  25 mg Oral Daily    enoxaparin  1 mg/kg SubCUTAneous Q12H    busPIRone  10 mg Oral TID    famotidine  20 mg Oral BID    Vitamin D  2,000 Units Oral Daily    ascorbic acid  1,000 mg Oral Daily    zinc sulfate  50 mg Oral Daily     PRN Meds: melatonin, heparin (porcine), heparin (porcine), LORazepam, diphenhydrAMINE, benzonatate, guaiFENesin-dextromethorphan, acetaminophen **OR** acetaminophen, ondansetron      Intake/Output Summary (Last 24 hours) at 12/14/2021 0725  Last data filed at 12/14/2021 0300  Gross per 24 hour   Intake 9358.72 ml   Output 1775 ml   Net 7583.72 ml       Diet:  ADULT DIET; Regular; No Added Salt (3-4 gm)  ADULT ORAL NUTRITION SUPPLEMENT; Breakfast, Lunch, Dinner; Standard High Calorie/High Protein Oral Supplement    Exam:  BP (!) 156/90   Pulse 117   Temp 97.8 °F (36.6 °C) (Axillary)   Resp 26   Ht 6' 1\" (1.854 m)   Wt 241 lb 11.2 oz (109.6 kg)   SpO2 90%   BMI 31.89 kg/m²     General appearance: No apparent distress, appears stated age and cooperative. HEENT: Pupils equal, round, and reactive to light. Conjunctivae/corneas clear. Neck: Supple, with full range of motion. No jugular venous distention. Trachea midline. Respiratory: Breathing unlabored. Breath sounds diminished at bases, diffuse crackles lower lung fields. Cardiovascular: Regular rate and rhythm with normal S1/S2 without murmurs, rubs or gallops. Abdomen: Soft, non-tender, non-distended with normal bowel sounds. Musculoskeletal: passive and active ROM x 4 extremities. Skin: Skin color, texture, turgor normal.  No rashes or lesions. Neurologic:  Neurovascularly intact without any focal sensory/motor deficits.  Cranial nerves: II-XII intact, grossly non-focal.  Psychiatric: Alert and oriented, thought content appropriate, normal insight  Capillary Refill: Brisk,< 3 seconds   Peripheral Pulses: +2 palpable, equal bilaterally       Labs:   Recent Labs     12/12/21  0631   WBC 15.6*   HGB 14.8   HCT 43.1        Recent Labs     12/12/21  0631      K 4.2 CL 97*   CO2 23   BUN 31*   CREATININE 1.0   CALCIUM 8.8     No results for input(s): AST, ALT, BILIDIR, BILITOT, ALKPHOS in the last 72 hours. No results for input(s): INR in the last 72 hours. No results for input(s): Lizeth Gazella in the last 72 hours. Microbiology:      Urinalysis:    No results found for: Anthony Plants, BACTERIA, RBCUA, BLOODU, SPECGRAV, GLUCOSEU    Radiology:  XR CHEST PORTABLE   Final Result   Impression:   Bilateral patchy infiltrates, similar to prior study. This document has been electronically signed by: Citlali Maldonado MD on    12/12/2021 03:41 AM      XR CHEST PORTABLE   Final Result   1. Normal heart size. No effusion. 2. Moderate groundglass infiltrates scattered in both lungs diffusely, consistent with interstitial pneumonia/edema. 3. Overall appearance of chest slightly worse than prior. **This report has been created using voice recognition software. It may contain minor errors which are inherent in voice recognition technology. **      Final report electronically signed by Dr. Annabel Davila on 12/9/2021 10:21 AM      CTA CHEST [address]   Final Result      1. A saddle pulmonary embolus is seen that extends predominantly into the left pulmonary arterial system involving the left main pulmonary artery, the left upper lobe pulmonary artery, the left upper lobe segmental and subsegmental pulmonary arteries. There is suspicion for right heart strain. Echocardiogram should be obtained to confirm the right at strain. The critical  finding(s) was called to Dr. Lesly Montoya at [phone] hours on 12/8/2021 by Dr. Teri Chin. Verbal acknowledgment and readback was given. 2. Diffuse groundglass opacification of both lungs with patchy areas of consolidation noted. Findings relate to multilobar pneumonia as can be seen with Covid-19 infection. 3. Very small bilateral pleural effusions are seen.       4. A 1.4 x 1.3 cm enhancing nodule in segment 5 of the liver is not well characterized on this study. May represent a vascular shunting vessels hepatic hemangioma. Further evaluation with MRI of the abdomen without and with contrast when the patient has    overcome acute illness. **This report has been created using voice recognition software. It may contain minor errors which are inherent in voice recognition technology. **      Final report electronically signed by Dr Tanner Canseco on 12/8/2021 4:34 PM      XR CHEST PORTABLE   Final Result   1. Cardiomegaly. 2. Multifocal hazy bilateral infiltrates. This document has been electronically signed by: Saleem Harrell M.D. on    12/08/2021 05:48 AM      CTA CHEST [address]   Final Result       1. No pulmonary emboli. 2. Diffuse groundglass opacity throughout both lungs with superimposed patchy areas of consolidation. **This report has been created using voice recognition software. It may contain minor errors which are inherent in voice recognition technology. **      Final report electronically signed by Dr. Rosa Maria Carbone on 12/5/2021 8:07 AM      XR CHEST PORTABLE   Final Result   Moderate severity bilateral areas of opacification which are compatible with Covid 19. **This report has been created using voice recognition software. It may contain minor errors which are inherent in voice recognition technology. **      Final report electronically signed by Dr. Rosa Maria Carbone on 12/5/2021 7:28 AM          DVT prophylaxis: [] Lovenox                                 [] SCDs                                 [] SQ Heparin                                 [] Encourage ambulation           [x] Already on Anticoagulation -therapeutic dose Lovenox     Code Status: Full Code    PT/OT Eval Status:     Tele:   [x] yes             [] no    Electronically signed by Kelin Cruz MD on 12/14/2021 at 7:25 AM

## 2021-12-14 NOTE — PROGRESS NOTES
[facility]  OCCUPATIONAL THERAPY MISSED TREATMENT NOTE  [facility] CV[facility] 4B  4B-11/011-A      Date: 2021  Patient Name: Abigail Velazquez        CSN: [id number]   : 1955  (77 y.o.)  Gender: male   Referring Practitioner: Dr. Enoch Daniels  Diagnosis: COVID 19         REASON FOR MISSED TREATMENT: On hold due to being intubated today.   Will check back next treatment day for being appropriate

## 2021-12-14 NOTE — PROGRESS NOTES
Comprehensive Nutrition Assessment    Type and Reason for Visit:  Initial, Consult (tube feeding ordering and management)    Nutrition Recommendations/Plan:   Initiate tube feeding: Vital 1.2 at 10ml/ hour, increase by 10ml/ hour every 8 hours as tolerated to goal 50ml/ hour  Flush one (2.5 ounce) Proteinex 2go BID  Additional free water flush as per Doctor    Nutrition Assessment:    Pt. nutritionally compromised AEB NPO status due to intubation. At risk for further nutrition compromise r/t +COVID diagnosis 12/2, acute respiratory failure, pulmonary embolism, variable meal intake earlier in admission and underlying medical condition (hx HTN, obesity). Nutrition recommendations/interventions as per above.     Malnutrition Assessment:  Malnutrition Status:  Insufficient data    Context:  Acute Illness     Findings of the 6 clinical characteristics of malnutrition:  Energy Intake:  Unable to assess  Weight Loss:  Unable to assess (no weight records per EMR)     Body Fat Loss:  Unable to assess     Muscle Mass Loss:  Unable to assess    Fluid Accumulation:  Unable to assess     Strength:  Not Performed    Estimated Daily Nutrient Needs:  Energy (kcal):  0154-8972 kcals (30-32) LATE PHASE; Weight Used for Energy Requirements:  Ideal (84kgm)     Protein (g):  168 grams (2); Weight Used for Protein Requirements:  Ideal (84kgm)        Fluid (ml/day):  as per Doctor; Method Used for Fluid Requirements:  Other (Comment)      Nutrition Related Findings:    + COVID diagnosis 12/2 with signs/ symptoms 11/29, intubated earlier today, OG tube with 50ml brown appearance output when suctioned per RN and reports +bowel sounds and discussed TF start; BM x 1 on 12/8; medication includes vitamin C, vitamin D, zinc, decadron, fentanyl, levophed, versed, diprivan; Sodium 130, Potassium 4.4, BUN 16, Creatinine 0.8, glucose 94    Wounds:  None       Current Nutrition Therapies:    Diet NPO  ADULT TUBE FEEDING; Orogastric; Peptide Based; Continuous; 10; Yes; 10; Q 8 hours; 50; 30; Q 4 hours  Current Tube Feeding (TF) Orders:  · Feeding Route: Orogastric  · Formula: Peptide Based (Vital 1.2)  · Schedule: Continuous (12/14 begin at 10ml/ hour, goal 50ml/ hour)  · Additives/Modulars: Protein (BID)  · Water Flushes: as per Doctor  · Goal TF & Flush Orders Provides:  3568 kcals (1440 TF, 208 modular, 869 diprivan), 142 grams (90 TF, 52 modular), 133 grams CHO, 6 grams fiber, 973ml free water in 1200ml/ 24 hours     Additional Calorie Sources:   Diprivan infusing at 32.9ml/ hour provides 869 lipid kcals/ 24 hours    Anthropometric Measures:  · Height: 6' 1\" (185.4 cm)  · Current Body Weight: 241 lb 11.2 oz (109.6 kg) (12/13; no edema)   · Admission Body Weight: 232 lb 3.2 oz (105.3 kg) (12/5; no edema)    · Usual Body Weight:  (no weight records per EMR)     · Ideal Body Weight: 184 lbs;   · BMI: 31.9  · BMI Categories: Obese Class 1 (BMI 30.0-34. 9)       Nutrition Diagnosis:   · Inadequate oral intake related to impaired respiratory function as evidenced by NPO or clear liquid status due to medical condition, intubation      Nutrition Interventions:   Food and/or Nutrient Delivery:  Start Tube Feeding  Nutrition Education/Counseling:  No recommendation at this time   Coordination of Nutrition Care:  Continue to monitor while inpatient    Goals:  Tube feeding to meet % nutrient needs appropriate for COVID recovery process while intubated       Nutrition Monitoring and Evaluation:   Behavioral-Environmental Outcomes:  None Identified   Food/Nutrient Intake Outcomes:  Enteral Nutrition Intake/Tolerance  Physical Signs/Symptoms Outcomes:  Biochemical Data, Fluid Status or Edema, Hemodynamic Status, Nutrition Focused Physical Findings, Skin, Weight, GI Status     Discharge Planning:     Too soon to determine     Electronically signed by Karmen Schirmer, RD, LD on 12/14/21 at 2:49 PM EST    Contact: [phone]

## 2021-12-14 NOTE — CARE COORDINATION
Discharge Planning Update:    Hospitalist following. Transferring to  for intubation, maxed on bipap with sats in 80s.

## 2021-12-14 NOTE — PROCEDURES
ICU PROCEDURE - ENDOTRACHEAL INTUBATION         INDICATION: acute hypoxic respiratory failure secondary to PE and covid pneumonia     TIME OUT: taken    Permission obtained, risks/benefits reviewed:    ANESTHESIA:   [x]Ketamine  []Ativan  [] Morphine  []Propofol  [x]Other medications:versed     ESTIMATED BLOOD LOSS:  None. COMPLICATIONS:  [x]N/A  [] Other:    LARYNGOSCOPIC AIRWAY GRADE (CORMACK-LEHANE):[]1  []2a  []2b []3  []4        INTUBATION EQUIPMENT USED:  [x] Direct laryngoscope only    OUTCOME: Successful placement of #  8  Taperguard Evac endotracheal via   [x]Oral route    INSERTION DEPTH:  26    cm from   [x]lip           CONFIRMATION OF TUBE POSITION:   [x]Capnography - Strong & repeatable exhaled CO2 detection   [x]Multiple point auscultation   []SpO2 response   [x]STAT X-ray   []Bronchoscopic assessment    UNUSUAL FINDINGS:    PROCEDURE:     Using direct laryngoscopy, the vocal cords were visualized and the endotracheal tube was placed through the cords under direct vision. Good breath sounds were auscultated bilaterally without sounds over abdomen. Appropriate strong & repeatable exhaled CO2 detection was confirmed.      Electronically signed by Tona Miranda MD on 12/14/2021 at 11:48 AM

## 2021-12-15 NOTE — CARE COORDINATION
12/15/21, 2:04 PM EST    DISCHARGE ON GOING [address] day: 10  Location: -11/011-A Reason for admit: COVID-19 [U07.1]   Procedure:   12/15 CXR    Impression:       1. Interval placement of right central line with distal tip overlying a   spectral location of the superior vena cava. No pneumothorax. 2. Unchanged appearance of scattered infiltrates and airspace disease   throughout the bilateral lungs. Barriers to Discharge: Remains intubated on ventilator. FiO2 85%, 60 liters oxygen, PEEP 12 with saturations at 95%. Tmax 100.5. Urine (+), WBC 21.3, TF via OG tube with Dietician following. PCP: Monet Castro  Readmission Risk Score: 9.1 ( )%  Patient Goals/Plan/Treatment Preferences: From home with spouse. Will follow for potential needs.

## 2021-12-15 NOTE — PROGRESS NOTES
This RN called and updated pt's spouse, Janice Alonso, on pt's condition. All questions and concerns were addressed and answered at this time.

## 2021-12-15 NOTE — PROGRESS NOTES
[facility]  PHYSICAL THERAPY MISSED TREATMENT NOTE  [facility] CVICU 4B    Date: 12/15/2021  Patient Name: Gamaliel Paredes        MRN: [id number]   : 1955  (77 y.o.)  Gender: male   Referring Practitioner: Kavin Geiger DO  Diagnosis: COVID-19         REASON FOR MISSED TREATMENT:  Missed Treat. Pt now intubated, PT will sign off and await new orders when medically stable.

## 2021-12-15 NOTE — PROCEDURES
Central Venous Catheterization Procedure Note    Indication:  [] Lack of adequate peripheral IV access  [x] Infusion of medications with high risk of extravasation injury  [] Hemodynamic monitoring  [] Hemodialysis  [] Extracorporeal therapies  [] Other:                     Time Out:  [x] Time out was completed immediately prior to the start of the procedure, which included verification of the correct patient, correct site and agreement on the procedure to be done. [] Time out was not done because procedure was emergent    Consent:  [x] The patient/surrogate decision maker was informed of the procedure indications, risks, benefits and alternatives. Questions were answered and consent was obtained to proceed with the procedure. The patient's spouse was consented by Dr. Ale Gilmore over the phone and provided verbal consent. [] Consent was not obtained, because the procedure was emergent, or the patient was unable to consent and the surrogate decision maker was not available. Type of Central Line Being Placed:   [x] Central venous    [] Hemodialysis  [] Pulmonary artery    Location:   [x] Internal Jugular Vein [x] Right [] Left  [] Subclavian Vein  [] Right [] Left  [] Femoral Vein   [] Right [] Left    Central Line Bundle:  [x] I washed/disinfected my hands prior to starting procedure  [x] Hat      [x] Mask      [x] Sterile gown      [x] Sterile gloves were worn throughout the procedure  [x] Everyone in the room during the procedure wore a mask  [x] Chlorhexidine was utilized to prep the skin and allowed to dry completely  [x] Full body drape was used to cover the patient    Ultrasound Guidance:   [x] Yes      [] No    Sterile Technique Used Throughout Procedure?   [x] Yes      [] No    Description/Findings:   [x] Dark, non-pulsatile blood return obtained from all ports  [] Appropriate waveform(s) seen  [] Other    Complications:  [x] None apparent  [] Other:    EBL: Less than 5 cc    Number of attempts:

## 2021-12-15 NOTE — PROGRESS NOTES
[facility]  OCCUPATIONAL THERAPY MISSED TREATMENT NOTE  [facility] CV[facility] 4B  4B-11/011-A      Date: 12/15/2021  Patient Name: Melissa Owen        CSN: [id number]   : 1955  (77 y.o.)  Gender: male   Referring Practitioner: Dr. Yanira Childress  Diagnosis: COVID 19         REASON FOR MISSED TREATMENT: patient has been intubated and is not appropriate for early mobility. OT to d/c patient from caseload. please re-order when appropriate and stable.

## 2021-12-15 NOTE — CONSULTS
CRITICAL CARE PROGRESS NOTE      Patient:  Arturo Mark    Unit/Bed:4B-11/011-A  YOB: 1955  MRN: [id number]   PCP: Li Brantley  Date of Admission: 12/5/2021  Chief Complaint:- shortness of breath     Assessment and Plan:    1. COVID pneumonia: Positive on 11/26/21 in [facility] and was admtited 12/05/21 was being managed on the medicine service, he was started on Decadron and baricitinib. The medicine was discontinued when patient is eloped a saddle PE. Patients hypoxia progressed and became bipap dependant  Patient remained hypoxic requiring transfer to the ICU and intubation today. Too late in course to give Tocilizumab  2. Acute respiratory failure: Secondary to Covid pneumonia and saddle pulmonary embolism. Maintain lung protective strategies. 3. Pulmonary embolism: Diagnosed on 12/8/2021 Patient on therapeutic Lovenox. Spoke with interventional radiology do not feel that thrombectomy indicated. Baricitinib was discontinued  4. Hypertension:  Home lopressor  5. Leukocytosis: Likely reactive however pancultures obtained today. Will hold on antibiotics. INITIAL H AND P AND ICU COURSE:  80-year-old gentleman who prior to COVID-19 infection was overall fairly healthy, he was unvaccinated to COVID-19. November 26 patient was diagnosed with Covid in [place] ER. Patient was given Decadron and discharged home on home oxygen. Patient symptoms continued to progress and became more short of breath and presented to the emergency department on December 5. Patient was found to have Covid pneumonia with acute respiratory failure was initiated on high flow nasal cannula at 100%. Initially this improved his pulse ox, he was started on Decadron as well as baricitinib. Over the course of his hospital stay his pulmonary function worsen, he transition to BiPAP. He was a BiPAP attempt dependent and then was doubly maxed out on BiPAP.   He developed a saddle pulmonary embolism and was started on therapeutic Lovenox and the Barinicimab was discontinued. Patient unfortunately on day of transfer to the ICU became profoundly to hypnic BiPAP 100% FiO2 and patient in respiratory distress. He was transferred to the ICU for management. Past Medical History:  Essential hypertension. Family History:  Non contributory. Social History:    Patient's wife Jayden Arshad lives with patient  Patient is a non-smoker. ROS   Unable to obtain at time of exam as patient was in respiratory distress. Scheduled Meds:   metoprolol tartrate  50 mg Oral BID    [Held by provider] furosemide  40 mg IntraVENous Daily    norepinephrine-sodium chloride        sodium chloride flush  5-40 mL IntraVENous 2 times per day    famotidine (PEPCID) injection  20 mg IntraVENous BID    dexamethasone  4 mg IntraVENous Q6H    losartan  25 mg Oral Daily    enoxaparin  1 mg/kg SubCUTAneous Q12H    busPIRone  10 mg Oral TID    Vitamin D  2,000 Units Oral Daily    ascorbic acid  1,000 mg Oral Daily    zinc sulfate  50 mg Oral Daily     Continuous Infusions:   midazolam 3 mg/hr (12/14/21 1731)    fentaNYL 500 mcg in dextrose 5% 100 ml infusion 125 mcg/hr (12/14/21 1045)    propofol 45 mcg/kg/min (12/14/21 1903)    norepinephrine 12 mcg/min (12/14/21 1858)    sodium chloride      dextrose 5% in lactated ringers 50 mL/hr at 12/14/21 1858       PHYSICAL EXAMINATION:  T:  97.  P: 93. RR: 25. B/P: 115/58  FiO2: 95. O2 Sat: 90      Body mass index is 31.89 kg/m². GCS:   15      General:   Patient in acute distress, unable to speak in sentences. Profound increased work of breathing  HEENT:  normocephalic and atraumatic. No scleral icterus. PERR  Neck: supple. No  Thyromegaly. Lungs: Coarse lung fields increased work of breathing  Cardiac: RRR. No JVD. Abdomen: soft. Nontender. Extremities:  No clubbing, cyanosis, or edema x 4. Vasculature: capillary refill < 3 seconds. Palpable dorsalis pedis pulses.   Skin:  warm and dry.  Psych:  Alert and oriented x3. Affect appropriate  Lymph:  No supraclavicular adenopathy. Neurologic:  No focal deficit. No seizures. Data: (All radiographs, tracings, PFTs, and imaging are personally viewed and interpreted unless otherwise noted).  Sodium 130, potassium 4.4, chloride 95, CO2 20, BUN 16, creatinine 0.8   White blood cell 21, hemoglobin 14, platelets 362   CT scan on 12 8 with saddle pulmonary embolism   12/14/2021 bilateral pulmonary infiltrates consistent with Covid pneumonia   Echo cardiogram on 12/8/2021, ejection fraction 50% no left ventricular wall motion normalities. Electronically signed by Toan Miranda MD  CRITICAL CARE SPECIALIST    35 minutes of critical care time was spent was  dyssynchronous. This did not include procedures.   Did include direct patient care  Electronically signed by Toan Miranda MD on 12/14/2021 at 8:46 PM

## 2021-12-15 NOTE — PROGRESS NOTES
CRITICAL CARE PROGRESS NOTE      Patient:  Rupinder Everett    Unit/Bed:4B-11/011-A  YOB: 1955  MRN: [id number]   PCP: Kathy Jesus  Date of Admission: 12/5/2021  Chief Complaint:-Shortness of breath    Assessment and Plan:    COVID-19 Pneumonia: Patient was positive on 11/26/2021. He failed treatment was admitted on 12/5/2021. Patient ultimately progressed to BiPAP and failed. Was intubated 12/14/2021. He remains on Decadron. Acute hypoxic respiratory failure: This is secondary to COVID-19 pneumonia and saddle pulmonary embolism. Continue lung protective strategies. Wean as tolerated. Saddle pulmonary embolism: Patient was diagnosed on 12/8/2021. He was started on therapeutic Lovenox. Interventional radiology does not feel that thrombectomy is indicated at this time. Baricitinib was discontinued. Bradycardia: Patient was bradycardic overnight. Was started on dopamine drip and this is improved. Continue to monitor  Leukocytosis: Awaiting pancultures. Holding on antibiotics until cultures come back. Patient has been afebrile  History of hypertension: Continue home Lopressor    INITIAL H AND P AND ICU COURSE:  Patient is a 69-year-old white male with only significant past medical history of hypertension who presented to [facility] ED on December 5 for complaints of progressive shortness of breath. Patient is unvaccinated against COVID-19. He was discharged from the [facility] emergency room on November 26 after being diagnosed with Covid. He was given Decadron at that time and discharged home on home oxygen. Patient continued to worsen which is when he came to the emergency room on December 5. He was found to have Covid pneumonia with acute respiratory failure with initiate high flow nasal cannula at 100%. Patient's pulse ox improved and he was started on Decadron as well baricitinib.   Over the course of his hospital stay, patient's respiratory function worsened and Vasculature: capillary refill < 3 seconds. Palpable dorsalis pedis pulses. Skin:  warm and dry. Psych: Unable to assess as patient is mechanically intubated and sedated  Lymph:  No supraclavicular adenopathy. Neurologic:  No focal deficit. No seizures. Data: (All radiographs, tracings, PFTs, and imaging are personally viewed and interpreted unless otherwise noted). Sodium 130, potassium 4.4, chloride 95, CO2 20, BUN 16, creatinine 0.8, anion gap 15, lactic acid 2.3, glucose 94, calcium 8.6, procalcitonin  WBC 21.3, hemoglobin 14.3, hematocrit 42, platelet 761  Lactic acid 2.3  Telemetry shows sinus rhythm with bradycardia  Chest x-ray shows unchanged appearance of infiltrates of the right lung. EKG shows sinus bradycardia  Urine cultures preliminary negative  Pneumonia panel and blood cultures pending  Meets Continued ICU Level Care Criteria:    [x] Yes   [] No - Transfer Planned to listed location:  [] HOSPITALIST CONTACTED-      Case and plan discussed with Dr. Rich Quiñones. Electronically signed by [facility] Hospital[facility]ilipe Lester DO  Patient seen by me. Case discussed with resident physician. Italicized font represents changes to the note made by me. CC time 35 minutes. Time was discontiguous. Time does not include procedures. Time does include my direct assessment of the patient and coordination of care.   Electronically signed by Isa Washburn MD on 12/15/2021 at 6:49 PM

## 2021-12-15 NOTE — SIGNIFICANT EVENT
Patient seen by me. Case discussed with resident physician. Unvaccinated COVID patient. Patient intubated 12/14/21. On therapeutic lovenox for large PE. Afebrile. Italicized font represents changes to the note made by me. CC time 35 minutes. Time was discontiguous. Time does not include procedures. Time does include my direct assessment of the patient and coordination of care.   Electronically signed by Malinda Thomas MD on 12/15/2021 at 6:25 PM

## 2021-12-16 NOTE — PROGRESS NOTES
Contacted family at this time with update on patient's status and plan of care. All questions answered.

## 2021-12-16 NOTE — CARE COORDINATION
12/16/21, 1:38 PM EST    DISCHARGE ON GOING EVALUATION    Lu Bartow day: 11  Location: [facility]11/011-A Reason for admit: COVID-19 [U07.1]   Procedure:   12/14 Intubated  12/15 CVC line placement    Barriers to Discharge: Remains intubated on ventilator, FiO2 100%, 60 liters of oxygen, PEEP 12 with saturations at 95%. Afebrile. WBC 12.1, creatinine 1.6. Dopamine, Fentanyl, Versed, Levophed drips. IV fluids, Vitamin C,D,zinc, Lovenox, Pepcid, diabetes management, Decadron. TF via OG tube with Dietician following. PCP: Cristian Hassan  Readmission Risk Score: 13.9 ( )%  Patient Goals/Plan/Treatment Preferences: From home with wife.  Will follow for needs,

## 2021-12-16 NOTE — PROGRESS NOTES
Comprehensive Nutrition Assessment    Type and Reason for Visit:  Reassess (tubefeeding)    Nutrition Recommendations/Plan:   -Recommend increase TF rate to 65 ml/hr Vital 1.2 d/t propofol being stopped to provide ~ 80% of estimated energy needs.  -Continue bolus 2.5 oz bottle [id number] (protein modular) BID.  -Additional free water flush per MD.    Nutrition Assessment:   Pt. improving from a nutritional standpoint AEB reports of tolerating TF's while NPO due to intubation. At risk for further nutrition compromise r/t  +COVID diagnosis 12/2 (s/s 11/29), acute respiratory failure, intubated 12/14/21, pulmonary embolism, variable meal intake earlier in admission, skin integrity issues,  and underlying medical condition (hx HTN, obesity). Nutrition recommendations/interventions as per above. Malnutrition Assessment:  Malnutrition Status:  Insufficient data    Context:  Acute Illness     Findings of the 6 clinical characteristics of malnutrition:  Energy Intake:  Unable to assess  Weight Loss:  Unable to assess (no weight records per EMR)     Body Fat Loss:  Unable to assess     Muscle Mass Loss:  Unable to assess    Fluid Accumulation:  Unable to assess     Strength:  Not Performed    Estimated Daily Nutrient Needs:  Energy (kcal):  6420-1609 kcals (30-32) LATE PHASE; Weight Used for Energy Requirements:  Ideal (84kgm)     Protein (g):  168 grams (2); Weight Used for Protein Requirements:  Ideal (84kgm)        Fluid (ml/day):  as per Doctor; Method Used for Fluid Requirements:  Other (Comment)      Nutrition Related Findings:   Patient remains intubated. Propofol stopped 12/15/21. No pronation therapy. 12/14/21: Sodium 136, Potassium 4.5, BUN 52, Creatinine 1.6, Glucose 154, Chemstick 149. Rx: vitamin C, decadron, humalog, vitamin D, zincate, dopamine, fentanyl, versed. Last BM 12/14/21.         Wounds:   (stage I buttocks)       Current Nutrition Therapies:    Diet NPO  ADULT TUBE FEEDING; Orogastric; Peptide Based; Continuous; 50; Yes; 15; Q 8 hours; 65; 30; Q 4 hours; Protein; 2.5 oz Proteinex2 Go BID  Current Tube Feeding (TF) Orders:  · Feeding Route: Orogastric  · Formula: Peptide Based (Vital 1.2)  · Schedule: Continuous  · Additives/Modulars: Protein (2.5 oz [id number] BID)  · Water Flushes: as per Doctor  · Current TF & Flush Orders Provides: Vital 12. at 50 ml/hr with protein modular BID. · Goal TF & Flush Orders Provides: Vital 1.2 at 65 ml/hr with protein modular BID~ 2080 kcals (1872 TF, 208 protein modular), 169 grams protein (117 TF, 52 protein modular), 173 grams CHO, 8 grams fiber, 1265 mls water, 1710 mls volume (1560 mls TF, 150 mls protein modular)/24 hours. Additional Calorie Sources:   Propofol stopped 12/15/21    Anthropometric Measures:  · Height: 6' 1\" (185.4 cm)  · Current Body Weight: 230 lb 9.6 oz (104.6 kg) (12/15/21 +1 edema)   · Admission Body Weight: 232 lb 3.2 oz (105.3 kg) (12/5; no edema)    · Usual Body Weight:  (no weight records per EMR)     · Ideal Body Weight: 184 lbs;   · BMI: 30.4  · Adjusted Body Weight:  ; No Adjustment   · BMI Categories: Obese Class 1 (BMI 30.0-34. 9)       Nutrition Diagnosis:   · Inadequate oral intake related to impaired respiratory function as evidenced by NPO or clear liquid status due to medical condition, intubation, nutrition support - enteral nutrition      Nutrition Interventions:   Food and/or Nutrient Delivery:  Continue NPO, Modify Tube Feeding  Nutrition Education/Counseling:  No recommendation at this time   Coordination of Nutrition Care:  Continue to monitor while inpatient    Goals:  Tube feeding to meet % nutrient needs appropriate for COVID recovery process while intubated       Nutrition Monitoring and Evaluation:   Behavioral-Environmental Outcomes:  None Identified   Food/Nutrient Intake Outcomes:  Enteral Nutrition Intake/Tolerance  Physical Signs/Symptoms Outcomes:  Biochemical Data, Fluid Status or Edema, Hemodynamic Status, Nutrition Focused Physical Findings, Skin, Weight, GI Status     Discharge Planning:     Too soon to determine     Electronically signed by Calli Holland RD, LD on 12/16/21 at 11:05 AM EST    Contact: [phone]

## 2021-12-16 NOTE — PROGRESS NOTES
CRITICAL CARE PROGRESS NOTE      Patient:  Anushka Lau    Unit/Bed:4B-11/011-A  YOB: 1955  MRN: [id number]   PCP: Tj Wilson  Date of Admission: 12/5/2021  Chief Complaint:-Shortness of breath    Assessment and Plan:    COVID-19 Pneumonia: Patient was positive on 11/26/2021. He failed treatment was admitted on 12/5/2021. Patient ultimately progressed to BiPAP and failed. Was intubated 12/14/2021. He remains on Decadron. Acute hypoxic respiratory failure: This is secondary to COVID-19 pneumonia and saddle pulmonary embolism. Continue lung protective strategies. Wean as tolerated. Saddle pulmonary embolism: Patient was diagnosed on 12/8/2021. He was started on therapeutic Lovenox. Interventional radiology does not feel that thrombectomy is indicated at this time. Baricitinib was discontinued. CHELSEA: Patient's creatinine bumped from 0.8 to 1.6. We will do urine studies and renal ultrasound. Patient continues to make urine. If patient develops oliguria, we will get nephrology on board. Bradycardia: Was started on dopamine drip and this is improved. Continue to monitor   Leukocytosis: Awaiting pancultures. Holding on antibiotics until cultures come back. Patient has been afebrile. Awaiting cultures. Leukocytosis improved today. History of hypertension: Continue home Lopressor    INITIAL H AND P AND ICU COURSE:  Patient is a 51-year-old white male with only significant past medical history of hypertension who presented to [facility] ED on December 5 for complaints of progressive shortness of breath. Patient is unvaccinated against COVID-19. He was discharged from the [facility] emergency room on November 26 after being diagnosed with Covid. He was given Decadron at that time and discharged home on home oxygen. Patient continued to worsen which is when he came to the emergency room on December 5.   He was found to have Covid pneumonia with acute respiratory failure with initiate high flow nasal cannula at 100%. Patient's pulse ox improved and he was started on Decadron as well baricitinib. Over the course of his hospital stay, patient's respiratory function worsened and he was subsequently transitioned to BiPAP. He eventually failed BiPAP. He also developed a saddle pulmonary embolism and was started on therapeutic Lovenox in the baricitinib was discontinued. On the day of transfer to the ICU patient became profoundly hypoxic on BiPAP and was in respiratory distress. He was transferred to the ICU for further management. He was intubated on December 14, 2021.    12/16/2021: No events overnight. Unable to wean on vent at this time. Not requiring any pressors at this time. Creatinine bumped up today. Will get urine studies. Past Medical History: Essential hypertension. Family History: Noncontributory. Social History: Patient is a non-smoker. ROS   Unable to assess as patient is mechanically ventilated and sedated    Scheduled Meds:   insulin lispro  0-6 Units SubCUTAneous Q6H    metoprolol tartrate  50 mg Oral BID    [Held by provider] furosemide  40 mg IntraVENous Daily    sodium chloride flush  5-40 mL IntraVENous 2 times per day    famotidine (PEPCID) injection  20 mg IntraVENous BID    dexamethasone  4 mg IntraVENous Q6H    losartan  25 mg Oral Daily    enoxaparin  1 mg/kg SubCUTAneous Q12H    busPIRone  10 mg Oral TID    Vitamin D  2,000 Units Oral Daily    ascorbic acid  1,000 mg Oral Daily    zinc sulfate  50 mg Oral Daily     Continuous Infusions:   dextrose Stopped (12/16/21 0400)    fentaNYL (SUBLIMAZE) 1250 mcg in sodium chloride 0.9 % 250 mL 175 mcg/hr (12/16/21 0815)    DOPamine 1.5 mcg/kg/min (12/16/21 0101)    midazolam 8 mg/hr (12/16/21 0709)    propofol Stopped (12/15/21 0320)    norepinephrine Stopped (12/15/21 1601)    sodium chloride         PHYSICAL EXAMINATION:  T:  98.3. P: 73 . RR:  17. B/P: 100/63.   FiO2: 95 O2 Sat:  95.  I/O: 1,746.3/1,495  Body mass index is 30.42 kg/m². PC: 30/12 TV: 468: RRTotal: 18: Ti:1 sec:   General:   Obese ill appearing male  HEENT:  normocephalic and atraumatic. No scleral icterus. PERR  Neck: supple. No Thyromegaly. Lungs: Decreased breath sounds no retractions  Cardiac: RRR. No JVD. Abdomen: soft. Nontender. Extremities:  No clubbing, cyanosis, or edema x 4. Vasculature: capillary refill < 3 seconds. Palpable dorsalis pedis pulses. Skin:  warm and dry. Psych: Unable to assess as patient is mechanically intubated and sedated  Lymph:  No supraclavicular adenopathy. Neurologic:  No focal deficit. No seizures. Data: (All radiographs, tracings, PFTs, and imaging are personally viewed and interpreted unless otherwise noted). Sodium 136, potassium 4.5, chloride 102, CO2 23, BUN 52, creatinine 1.6, anion gap 11, magnesium 2.5, calcium 7.8  WBC 12.1, hemoglobin 11.2, hematocrit 33.9, platelets 912. CRP 13.49  Urine sodium, creatinine, osmolality pending  Renal ultrasound pending  Lactic acid 2.3  Telemetry shows sinus rhythm  Chest x-ray shows unchanged appearance of infiltrates of the right lung. EKG shows sinus bradycardia  Urine cultures preliminary negative  Pneumonia panel and blood cultures pending  Meets Continued ICU Level Care Criteria:    [x] Yes   [] No - Transfer Planned to listed location:  [] HOSPITALIST CONTACTED- DR     Case and plan discussed with Dr. Hortensia Martinez. Electronically signed by Rosemary Fragoso DO  Patient seen by me. Case discussed with resident physician. Patient with increasing oxygen requirements. Initiate pronation therapy. Italicized font represents changes to the note made by me. CC time 35 minutes. Time was discontiguous. Time does not include procedures. Time does include my direct assessment of the patient and coordination of care.   Electronically signed by Charmaine Guevara MD on 12/16/2021 at 5:06 PM

## 2021-12-16 NOTE — FLOWSHEET NOTE
12/16/21 0142   Provider Notification   Reason for Communication Review case   Provider Name [facility]   Provider Notification Resident   Method of Communication Secure Message   Response See orders   Notification Time 0142   At this time notified Resident that patient is without glucose monitoring and a sliding scale. See Orders.

## 2021-12-17 NOTE — PROGRESS NOTES
Soft bilateral wrist restraints applied to patient. He is at risk for removing his oral endotracheal tube and lines which pose a risk to safety and disruption of therapy. Please see restraint flowsheet for more details.

## 2021-12-17 NOTE — PROGRESS NOTES
CRITICAL CARE PROGRESS NOTE      Patient:  Judson Martinez    Unit/Bed:4B-11/011-A  YOB: 1955  MRN: [id number]   PCP: Sydnee Maguire  Date of Admission: 12/5/2021  Chief Complaint:-Shortness of breath    Assessment and Plan:    COVID-19 Pneumonia: Patient was positive on 11/26/2021. He failed treatment was admitted on 12/5/2021. Patient ultimately progressed to BiPAP and failed. Was intubated 12/14/2021. He remains on Decadron. Acute hypoxic respiratory failure: This is secondary to COVID-19 pneumonia and pulmonary embolism. Continue lung protective strategies. Wean as tolerated. Severe ARDS: Proning therapy initiated  Pulmonary embolism: Patient was diagnosed on 12/8/2021. He was started on therapeutic Lovenox. Interventional radiology does not feel that thrombectomy is indicated at this time. Baricitinib was discontinued. CHELSEA: Patient's creatinine bumped from 0.8 to 1.6 yesterday. Creatinine today improved to 1.2 with fluids. Urine studies negative. Patient continues to make urine. If patient develops oliguria, we will get nephrology on board however patient's CHELSEA is improving. Bradycardia: Was started on dopamine drip and this is improved. Continue to monitor   Leukocytosis: Awaiting pancultures. Holding on antibiotics until cultures come back. Patient has been afebrile. Awaiting cultures. History of hypertension: Continue home Lopressor    INITIAL H AND P AND ICU COURSE:  Patient is a 14-year-old white male with only significant past medical history of hypertension who presented to [facility] ED on December 5 for complaints of progressive shortness of breath. Patient is unvaccinated against COVID-19. He was discharged from the [facility] emergency room on November 26 after being diagnosed with Covid. He was given Decadron at that time and discharged home on home oxygen.   Patient continued to worsen which is when he came to the emergency room on December 5.  He was found to have Covid pneumonia with acute respiratory failure with initiate high flow nasal cannula at 100%. Patient's pulse ox improved and he was started on Decadron as well baricitinib. Over the course of his hospital stay, patient's respiratory function worsened and he was subsequently transitioned to BiPAP. He eventually failed BiPAP. He also developed a saddle pulmonary embolism and was started on therapeutic Lovenox in the baricitinib was discontinued. On the day of transfer to the ICU patient became profoundly hypoxic on BiPAP and was in respiratory distress. He was transferred to the ICU for further management. He was intubated on December 14, 2021.    12/16/2021: No events overnight. Unable to wean on vent at this time. Not requiring any pressors at this time. Creatinine bumped up today. Will get urine studies. 12/17/2021: No events overnight. Remaining on stable ventilator settings. Creatinine improved with fluids. Past Medical History: Essential hypertension. Family History: Noncontributory. Social History: Patient is a non-smoker.     ROS   Unable to assess as patient is mechanically ventilated and sedated    Scheduled Meds:   insulin lispro  0-6 Units SubCUTAneous Q6H    dexamethasone  10 mg IntraVENous Q24H    metoprolol tartrate  50 mg Oral BID    [Held by provider] furosemide  40 mg IntraVENous Daily    sodium chloride flush  5-40 mL IntraVENous 2 times per day    famotidine (PEPCID) injection  20 mg IntraVENous BID    losartan  25 mg Oral Daily    enoxaparin  1 mg/kg SubCUTAneous Q12H    busPIRone  10 mg Oral TID    Vitamin D  2,000 Units Oral Daily    ascorbic acid  1,000 mg Oral Daily    zinc sulfate  50 mg Oral Daily     Continuous Infusions:   dextrose Stopped (12/16/21 0400)    sodium chloride 75 mL/hr at 12/16/21 1946    fentaNYL (SUBLIMAZE) 1250 mcg in sodium chloride 0.9 % 250 mL 175 mcg/hr (12/17/21 0659)    DOPamine 1.5 mcg/kg/min (12/16/21 1946)    midazolam 9 mg/hr (12/17/21 0659)    norepinephrine Stopped (12/15/21 1542)    sodium chloride         PHYSICAL EXAMINATION:  T:  98.6. P: 93 . RR:  19. B/P: 107/67. FiO2: 80 O2 Sat:  95.  I/O:  2,886.3/2,000  Body mass index is 30.42 kg/m². PC: 17/12 TV: 433: RRTotal: 18: Ti:1 sec:   General:   Obese ill appearing male  HEENT:  normocephalic and atraumatic. No scleral icterus. PERR  Neck: supple. No Thyromegaly. Lungs: Decreased breath sounds no retractions  Cardiac: RRR. No JVD. Abdomen: soft. Nontender. Extremities:  No clubbing, cyanosis, or edema x 4. Vasculature: capillary refill < 3 seconds. Palpable dorsalis pedis pulses. Skin:  warm and dry. Psych: Unable to assess as patient is mechanically intubated and sedated  Lymph:  No supraclavicular adenopathy. Neurologic:  No focal deficit. No seizures. Data: (All radiographs, tracings, PFTs, and imaging are personally viewed and interpreted unless otherwise noted). Sodium 139, potassium 4.8, chloride 105, CO2 25, BUN 55, creatinine 1.2, anion gap 9.0, magnesium 2.4, calcium 7.9  WBC 12/16/2021 12.1, hemoglobin 11.2, hematocrit 33.9, platelets 110. CRP 7.16  Urine sodium 21, creatinine 76.7, osmolality 504  Renal ultrasound shows left renal cyst  Lactic acid 2.3  Telemetry shows sinus rhythm  Chest x-ray shows unchanged appearance of infiltrates of the right lung. EKG shows sinus bradycardia  Urine cultures preliminary negative  Pneumonia panel and blood cultures pending  Meets Continued ICU Level Care Criteria:    [x] Yes   [] No - Transfer Planned to listed location:  [] HOSPITALIST CONTACTED-      Case and plan discussed with Dr. Isela Baker. Electronically signed by Anuel Nguyen DO  Patient seen by me. Case discussed with physician. Patient on Lovenox. .  Italicized font represents changes to the note made by me. CC time 35 minutes. Time was discontiguous. Time does not include procedures.   Time does include my direct assessment of the patient and coordination of care.   Electronically signed by Cristal Villaseñor MD on 12/17/2021 at 6:10 PM

## 2021-12-17 NOTE — CARE COORDINATION
12/17/21, 8:49 AM EST    DISCHARGE ON GOING EVALUATION    Lady Lo day: 12  Location: [facility]11/011-A Reason for admit: COVID-19 [U07.1]   Procedure: 12/14 Intubated  12/15 CVC line placement  Barriers to Discharge: Remains vented. FIO2 80%. PEEP 12. Sat 95%. Dopamine, Fentanyl, Levo, Versed, NS 75/hr, Dexamethasone, Lovenox. No pressors needed presently. CRP 7. 16. Creat 1.2 . Dietitian following for TF per OGT. PCP: Karen Huynh  Readmission Risk Score: 13.7 ( )%  Patient Goals/Plan/Treatment Preferences: From home with wife. Will follow for needs.

## 2021-12-17 NOTE — PROGRESS NOTES
Dr. Bo Yao contacted at [phone] regarding resuming tube feeds for patient. No new orders received at this time. Dietary encouraging tube feeds to be resumed unless medically contraindicated.

## 2021-12-19 NOTE — PROGRESS NOTES
CRITICAL CARE PROGRESS NOTE      Patient:  Judson Martinez    Unit/Bed:4B-11/011-A  YOB: 1955  MRN: [id number]   PCP: Sydnee Maguire  Date of Admission: 12/5/2021  Chief Complaint:-Shortness of breath    Assessment and Plan:    1. COVID-19 Pneumonia: Patient was positive on 11/26/2021. He failed treatment was admitted on 12/5/2021. Patient ultimately progressed to BiPAP and failed. Was intubated 12/14/2021. He remains on Decadron. 2. Acute hypoxic respiratory failure: This is secondary to COVID-19 pneumonia and pulmonary embolism. Continue lung protective strategies. Wean as tolerated. 3. Severe ARDS: Proning therapy initiated  4. Pulmonary embolism (Saddle, no RV strain): Patient was diagnosed on 12/8/2021. He was started on therapeutic Lovenox. Interventional radiology does not feel that thrombectomy is indicated at this time. Baricitinib was discontinued. 5. CHELSEA: Patient's creatinine bumped from 0.8 to 1.6 yesterday. Creatinine today improved to 1.2 with fluids. Urine studies negative. Patient continues to make urine. If patient develops oliguria, we will get nephrology on board however patient's CHELSEA is improving. 6. Bradycardia: Was started on dopamine drip and this is improved. Continue to monitor. 12/18 - HR 91. Remains on dopamine 2 mcg/kg/min. Nurse asked to wean while maintaining HR>60.     7. Leukocytosis: Awaiting pancultures. Holding on antibiotics until cultures come back. Patient has been afebrile. Awaiting cultures. 8. History of hypertension: Continue home Lopressor    INITIAL H AND P AND ICU COURSE:  Patient is a 20-year-old white male with only significant past medical history of hypertension who presented to [facility] ED on December 5 for complaints of progressive shortness of breath. Patient is unvaccinated against COVID-19. He was discharged from the [facility] emergency room on November 26 after being diagnosed with Covid.   He was given Decadron at that time and discharged home on home oxygen. Patient continued to worsen which is when he came to the emergency room on December 5. He was found to have Covid pneumonia with acute respiratory failure with initiate high flow nasal cannula at 100%. Patient's pulse ox improved and he was started on Decadron as well baricitinib. Over the course of his hospital stay, patient's respiratory function worsened and he was subsequently transitioned to BiPAP. He eventually failed BiPAP. He also developed a saddle pulmonary embolism and was started on therapeutic Lovenox in the baricitinib was discontinued. On the day of transfer to the ICU patient became profoundly hypoxic on BiPAP and was in respiratory distress. He was transferred to the ICU for further management. He was intubated on December 14, 2021.    12/16/2021: No events overnight. Unable to wean on vent at this time. Not requiring any pressors at this time. Creatinine bumped up today. Will get urine studies. 12/17/2021: No events overnight. Remaining on stable ventilator settings. Creatinine improved with fluids. Past Medical History: Essential hypertension. Family History: Noncontributory. Social History: Patient is a non-smoker.     ROS   Unable to assess as patient is mechanically ventilated and sedated    Scheduled Meds:   insulin lispro  0-6 Units SubCUTAneous Q6H    dexamethasone  10 mg IntraVENous Q24H    metoprolol tartrate  50 mg Oral BID    [Held by provider] furosemide  40 mg IntraVENous Daily    sodium chloride flush  5-40 mL IntraVENous 2 times per day    famotidine (PEPCID) injection  20 mg IntraVENous BID    losartan  25 mg Oral Daily    enoxaparin  1 mg/kg SubCUTAneous Q12H    busPIRone  10 mg Oral TID    Vitamin D  2,000 Units Oral Daily    ascorbic acid  1,000 mg Oral Daily    zinc sulfate  50 mg Oral Daily     Continuous Infusions:   dexmedetomidine 0.2 mcg/kg/hr (12/18/21 1172)    dextrose Stopped (12/16/21 0400)    sodium chloride 75 mL/hr at 12/18/21 1802    fentaNYL (SUBLIMAZE) 1250 mcg in sodium chloride 0.9 % 250 mL 200 mcg/hr (12/18/21 1802)    DOPamine Stopped (12/18/21 1746)    midazolam 10 mg/hr (12/18/21 2122)    norepinephrine Stopped (12/15/21 1542)    sodium chloride         PHYSICAL EXAMINATION:  T:  98.6. P: 93 . RR:  19. B/P: 107/67. FiO2: 80 O2 Sat:  95.  I/O:  2,886.3/2,000  Body mass index is 30.42 kg/m². PC: 17/12 TV: 433: RRTotal: 18: Ti:1 sec:   General:   Obese ill appearing male  HEENT:  normocephalic and atraumatic. No scleral icterus. PERR  Neck: supple. No Thyromegaly. Lungs: Decreased breath sounds no retractions  Cardiac: RRR. No JVD. Abdomen: soft. Nontender. Extremities:  No clubbing, cyanosis, or edema x 4. Vasculature: capillary refill < 3 seconds. Palpable dorsalis pedis pulses. Skin:  warm and dry. Psych: Unable to assess as patient is mechanically intubated and sedated  Lymph:  No supraclavicular adenopathy. Neurologic:  No focal deficit. No seizures. Data: (All radiographs, tracings, PFTs, and imaging are personally viewed and interpreted unless otherwise noted).  Sodium 139, potassium 4.8, chloride 105, CO2 25, BUN 55, creatinine 1.2, anion gap 9.0, magnesium 2.4, calcium 7.9   WBC 12/16/2021 12.1, hemoglobin 11.2, hematocrit 33.9, platelets 653.  CRP 7.16   Urine sodium 21, creatinine 76.7, osmolality 504   Renal ultrasound shows left renal cyst   Lactic acid 2.3   Telemetry shows sinus rhythm   Chest x-ray shows unchanged appearance of infiltrates of the right lung.    EKG shows sinus bradycardia   Urine cultures preliminary negative   Pneumonia panel and blood cultures pending  Meets Continued ICU Level Care Criteria:    [x] Yes   [] No - Transfer Planned to listed location:  [] HOSPITALIST CONTACTED-      Case and plan discussed with Dr. Yoandy Lujan MD.    Electronically signed by Kavin Hinds MD on 12/18/2021 at 9:39 PM

## 2021-12-19 NOTE — PROGRESS NOTES
Call placed to wife Edmar Estrada and son. Updated on plan of care throughout the night. All questions answered at this time.

## 2021-12-20 NOTE — SIGNIFICANT EVENT
Patient seen by me. Case discussed with resident physician. Persistently critically ill on mechanical ventilator. On 70% FiO2 with PEEP of 12. Baricitinib discontinued secondary to pulmonary embolism. Patient on therapeutic Lovenox. .  Italicized font represents changes to the note made by me. CC time 35 minutes. Time was discontiguous. Time does not include procedures. Time does include my direct assessment of the patient and coordination of care.   Electronically signed by Paula Nugent MD on 12/20/2021 at 6:30 PM

## 2021-12-20 NOTE — PROGRESS NOTES
CRITICAL CARE PROGRESS NOTE      Patient:  Mert Laguerre    Unit/Bed:4B-11/011-A  YOB: 1955  MRN: [id number]   PCP: Elliot Montalvo  Date of Admission: 12/5/2021  Chief Complaint:-Shortness of breath    Assessment and Plan:    1. COVID-19 Pneumonia: Patient was positive on 11/26/2021. He failed treatment was admitted on 12/5/2021. Patient ultimately progressed to BiPAP and failed. Was intubated 12/14/2021. He remains on Decadron. 2. Acute hypoxic respiratory failure: This is secondary to COVID-19 pneumonia and pulmonary embolism. Continue lung protective strategies. Wean as tolerated. 3. Severe ARDS: Proning therapy initiated  4. Pulmonary embolism (Saddle, no RV strain): Patient was diagnosed on 12/8/2021. He was started on therapeutic Lovenox. Interventional radiology does not feel that thrombectomy is indicated at this time. Baricitinib was discontinued. 5. CHELSEA: Patient's creatinine bumped from 0.8 to 1.6 yesterday. Creatinine today improved to 1.2 with fluids. Urine studies negative. Patient continues to make urine. If patient develops oliguria, we will get nephrology on board however patient's CHELSEA is improving. 6. Bradycardia: Was started on dopamine drip and this is improved. Dopamine drip discontinued. Pt able to maintain heart rate. 12/18 - HR 91. Remains on dopamine 2 mcg/kg/min. Nurse asked to wean while maintaining HR>60.     7. Leukocytosis: Awaiting pancultures. Holding on antibiotics until cultures come back. Patient has been afebrile. Awaiting cultures. 8. History of hypertension: Continue home Lopressor    INITIAL H AND P AND ICU COURSE:  Patient is a 51-year-old white male with only significant past medical history of hypertension who presented to [facility] ED on December 5 for complaints of progressive shortness of breath. Patient is unvaccinated against COVID-19.   He was discharged from the [facility] emergency room on November 26 after being diagnosed with Covid. He was given Decadron at that time and discharged home on home oxygen. Patient continued to worsen which is when he came to the emergency room on December 5. He was found to have Covid pneumonia with acute respiratory failure with initiate high flow nasal cannula at 100%. Patient's pulse ox improved and he was started on Decadron as well baricitinib. Over the course of his hospital stay, patient's respiratory function worsened and he was subsequently transitioned to BiPAP. He eventually failed BiPAP. He also developed a saddle pulmonary embolism and was started on therapeutic Lovenox in the baricitinib was discontinued. On the day of transfer to the ICU patient became profoundly hypoxic on BiPAP and was in respiratory distress. He was transferred to the ICU for further management. He was intubated on December 14, 2021.    12/16/2021: No events overnight. Unable to wean on vent at this time. Not requiring any pressors at this time. Creatinine bumped up today. Will get urine studies. 12/17/2021: No events overnight. Remaining on stable ventilator settings. Creatinine improved with fluids. Past Medical History: Essential hypertension. Family History: Noncontributory. Social History: Patient is a non-smoker.     ROS   Unable to assess as patient is mechanically ventilated and sedated    Scheduled Meds:   insulin lispro  0-6 Units SubCUTAneous Q6H    dexamethasone  10 mg IntraVENous Q24H    metoprolol tartrate  50 mg Oral BID    [Held by provider] furosemide  40 mg IntraVENous Daily    sodium chloride flush  5-40 mL IntraVENous 2 times per day    famotidine (PEPCID) injection  20 mg IntraVENous BID    losartan  25 mg Oral Daily    enoxaparin  1 mg/kg SubCUTAneous Q12H    busPIRone  10 mg Oral TID    Vitamin D  2,000 Units Oral Daily    ascorbic acid  1,000 mg Oral Daily    zinc sulfate  50 mg Oral Daily     Continuous Infusions:   dexmedetomidine 0.3 mcg/kg/hr (12/19/21 1910)    dextrose Stopped (12/16/21 0400)    sodium chloride 75 mL/hr at 12/19/21 1910    fentaNYL (SUBLIMAZE) 1250 mcg in sodium chloride 0.9 % 250 mL 200 mcg/hr (12/19/21 1910)    DOPamine Stopped (12/18/21 1746)    midazolam 10 mg/hr (12/19/21 1910)    norepinephrine Stopped (12/15/21 1542)    sodium chloride         PHYSICAL EXAMINATION:  T:  98.6. P: 93 . RR:  19. B/P: 107/67. FiO2: 80 O2 Sat:  95.  I/O:  2,886.3/2,000  Body mass index is 30.54 kg/m². PC: 17/12 TV: 433: RRTotal: 18: Ti:1 sec:   General:   Obese ill appearing male  HEENT:  normocephalic and atraumatic. No scleral icterus. PERR  Neck: supple. No Thyromegaly. Lungs: Decreased breath sounds no retractions  Cardiac: RRR. No JVD. Abdomen: soft. Nontender. Extremities:  No clubbing, cyanosis, or edema x 4. Vasculature: capillary refill < 3 seconds. Palpable dorsalis pedis pulses. Skin:  warm and dry. Psych: Unable to assess as patient is mechanically intubated and sedated  Lymph:  No supraclavicular adenopathy. Neurologic:  No focal deficit. No seizures. Data: (All radiographs, tracings, PFTs, and imaging are personally viewed and interpreted unless otherwise noted).  Sodium 139, potassium 4.8, chloride 105, CO2 25, BUN 55, creatinine 1.2, anion gap 9.0, magnesium 2.4, calcium 7.9   WBC 12/16/2021 12.1, hemoglobin 11.2, hematocrit 33.9, platelets 104.  CRP 7.16   Urine sodium 21, creatinine 76.7, osmolality 504   Renal ultrasound shows left renal cyst   Lactic acid 2.3   Telemetry shows sinus rhythm   Chest x-ray shows unchanged appearance of infiltrates of the right lung.    EKG shows sinus bradycardia   Urine cultures preliminary negative   Pneumonia panel and blood cultures pending  Meets Continued ICU Level Care Criteria:    [x] Yes   [] No - Transfer Planned to listed location:  [] HOSPITALIST CONTACTED- DR     Case and plan discussed with Dr. Romeo Jacobo MD.    Electronically signed by Jose Villalpando MD on 12/19/2021 at 7:53 PM

## 2021-12-20 NOTE — CARE COORDINATION
12/20/21, 2:26 PM EST    DISCHARGE ON GOING EVALUATION    [facility] day: 15  Location: [facility]11/011-A Reason for admit: COVID-19 [U07.1]   Procedure:   12/14 Intubated  12/15 CVC line placement     Barriers to Discharge: remains intubated on ventilator. 70% FiO2, 60 liters oxygen, PEEP 12 with saturations at 96%. Afebrile. TF via OG tube with Dietician following. Precedex, Fentanyl, Versed drips. IV fluids, Vitamin C,D,zinc, Decadron, Lovenox, Pepcid, diabetes management. PCP: Yovani Saravia  Readmission Risk Score: 14.9 ( )%  Patient Goals/Plan/Treatment Preferences: From home with spouse. Will follow.

## 2021-12-20 NOTE — PROGRESS NOTES
Comprehensive Nutrition Assessment    Type and Reason for Visit:  Reassess (tubefeeding)    Nutrition Recommendations/Plan:   -Recommend increase Vital 1.2 by 10ml/ hour every 8 hours as tolerated to goal 65ml/ hour, which meets ~80% of estimated energy needs  -Continue protein modular: 2.5 ounce Proteinex 2go BID  -Additional free water flush as per Doctor  -Consider scheduled bowel medications, last BM: 12/14    Nutrition Assessment:   Patient improving from nutritional standpoint AEB tolerating tube feeding at 35ml/ hour with plans to increase to 45ml/ hour today, while NPO due to intubation. At risk for nutritional compromise related to + COVID diagnosis 12/2 (s/s 11/29), acute respiratory failure, intubated 12/14/21, pulmonary embolism, variable meal intake earlier in admission, skin integrity issues, underlying medical condition (hx HTN, obesity). Nutrition recommendations/ interventions as per above. Malnutrition Assessment:  Malnutrition Status:  Insufficient data    Context:  Acute Illness     Findings of the 6 clinical characteristics of malnutrition:  Energy Intake:  Unable to assess  Weight Loss:  Unable to assess (no weight records per EMR)     Body Fat Loss:  Unable to assess     Muscle Mass Loss:  Unable to assess    Fluid Accumulation:  Unable to assess     Strength:  Not Performed    Estimated Daily Nutrient Needs:  Energy (kcal):  4929-6915 kcals (30-32) LATE PHASE; Weight Used for Energy Requirements:  Ideal (84kgm)     Protein (g):  168 grams (2); Weight Used for Protein Requirements:  Ideal (84kgm)        Fluid (ml/day):  as per Doctor; Method Used for Fluid Requirements:  Other (Comment)      Nutrition Related Findings:     Patient remains intubated.  Vital 1.2 infusing at 35ml/ hour and RN reports plans to increase by 10ml/ hour today, reports hypoactive bowel sounds, RN reports will give prn glycolax, last BM: 12/14; Glucose 143, BUN 59, Creatinine 0.9, Potassium 4.8; medication includes vitamin C, dexamethasone, humalog, vitamin D, zinc, precedex, fentanyl, versed, prn glycolax     Wounds:   (buttocks: stage I and DTI)       Current Nutrition Therapies:    ADULT TUBE FEEDING; Orogastric; Peptide Based; Continuous; 50; Yes; 15; Q 8 hours; 65; 30; Q 4 hours; Protein; 2.5 oz Proteinex2 Go BID  Current Tube Feeding (TF) Orders:  · Feeding Route: Orogastric  · Formula: Peptide Based (Vital 1.2)  · Schedule: Continuous  · Additives/Modulars: Protein (2.5 oz [id number] BID)  · Water Flushes: as per Doctor  · Goal TF & Flush Orders Provides: Vital 1.2 at 65 ml/hr with protein modular BID~ 2080 kcals (1872 TF, 208 protein modular), 169 grams protein (117 TF, 52 protein modular), 173 grams CHO, 8 grams fiber, 1265 mls water, 1710 mls volume (1560 mls TF, 150 mls protein modular)/24 hours. Additional Calorie Sources:   Propofol stopped 12/15/21    Anthropometric Measures:  · Height: 6' 1\" (185.4 cm)  · Current Body Weight: 231 lb 7.7 oz (105 kg) (12/19; trace edema)   · Admission Body Weight: 232 lb 3.2 oz (105.3 kg) (12/5; no edema)    · Usual Body Weight:  (no weight records per EMR)     · Ideal Body Weight: 184 lbs;   · BMI: 30.5  · Adjusted Body Weight:  ; No Adjustment    · BMI Categories: Obese Class 1 (BMI 30.0-34. 9)       Nutrition Diagnosis:   · Inadequate oral intake related to impaired respiratory function as evidenced by NPO or clear liquid status due to medical condition,intubation,nutrition support - enteral nutrition      Nutrition Interventions:   Food and/or Nutrient Delivery:  Continue NPO,Continue Current Tube Feeding  Nutrition Education/Counseling:  No recommendation at this time   Coordination of Nutrition Care:  Continue to monitor while inpatient    Goals:  Tube feeding to meet % nutrient needs appropriate for COVID recovery process while intubated       Nutrition Monitoring and Evaluation:   Behavioral-Environmental Outcomes:  None Identified   Food/Nutrient Intake Outcomes:  Enteral Nutrition Intake/Tolerance  Physical Signs/Symptoms Outcomes:  Biochemical Data,Fluid Status or Edema,Hemodynamic Status,Nutrition Focused Physical Findings,Skin,Weight,GI Status     Discharge Planning:     Too soon to determine     Electronically signed by Flor Templeton RD, LD on 12/20/21 at 11:45 AM EST    Contact: [phone]

## 2021-12-20 NOTE — PROGRESS NOTES
CRITICAL CARE PROGRESS NOTE      Patient:  Greg Ness    Unit/Bed:4B-11/011-A  YOB: 1955  MRN: [id number]   PCP: Lisandro Walton  Date of Admission: 12/5/2021  Chief Complaint:- Shortness of breath     Assessment and Plan:      Acute hypoxic respiratory failure: This is secondary to COVID-19 pneumonia and pulmonary embolism. Continue lung protective strategies. Wean as tolerated. COVID-19 Pneumonia: Patient was positive on 11/26/2021. He failed treatment was admitted on 12/5/2021. Patient ultimately progressed to BiPAP and failed. Was intubated 12/14/2021. He remains on Decadron. Severe ARDS: Proning therapy initiated    Pulmonary embolism (Saddle, no RV strain): Patient was diagnosed on 12/8/2021. He was started on therapeutic Lovenox. Interventional radiology does not feel that thrombectomy is indicated at this time. Baricitinib was discontinued. CHELSEA: Patient's creatinine bumped from 0.8 to 1.6 yesterday. Creatinine today improved to 1.2 with fluids. Urine studies negative. Patient continues to make urine. If patient develops oliguria, we will get nephrology on board however patient's CHELSEA is improving. Leukocytosis: Pancultures remain negative. Holding on antibiotics until cultures come back. Patient does have low-grade fever, likely secondary to COVID-19 pneumonia. History of hypertension: Continue home Lopressor (with holding parameters)     Bradycardia: Resolved. Was started on dopamine drip and this is improved. Dopamine drip discontinued. Pt able to maintain heart rate. INITIAL H AND P AND ICU COURSE:  Patient is a 60-year-old white male with only significant past medical history of hypertension who presented to [facility] ED on December 5 for complaints of progressive shortness of breath. Patient is unvaccinated against COVID-19. He was discharged from the [facility] emergency room on November 26 after being diagnosed with Covid.   He was given Decadron at that time and discharged home on home oxygen. Patient continued to worsen which is when he came to the emergency room on December 5. He was found to have Covid pneumonia with acute respiratory failure with initiate high flow nasal cannula at 100%. Patient's pulse ox improved and he was started on Decadron as well baricitinib. Over the course of his hospital stay, patient's respiratory function worsened and he was subsequently transitioned to BiPAP. He eventually failed BiPAP. He also developed a saddle pulmonary embolism and was started on therapeutic Lovenox in the baricitinib was discontinued. On the day of transfer to the ICU patient became profoundly hypoxic on BiPAP and was in respiratory distress. He was transferred to the ICU for further management. He was intubated on December 14, 2021.  12/16/2021: No events overnight. Unable to wean on vent at this time. Not requiring any pressors at this time. Creatinine bumped up today. Will get urine studies. 12/17/2021: No events overnight. Remaining on stable ventilator settings. Creatinine improved with fluids. 12/20/21: Continues with high ventilatory settings FiO2 70% PEEP of 12. Patient was discontinued on baricitinib due to PE. Patient continued on therapeutic Lovenox. Past Medical History:  Per HPI   Social History: lifetime non smoker, no etoh, no evidence of illicit drug use      ROS   Unable to assess due to patient being intubated, sedated and on mechanical ventilation.      Scheduled Meds:   insulin lispro  0-6 Units SubCUTAneous Q6H    dexamethasone  10 mg IntraVENous Q24H    metoprolol tartrate  50 mg Oral BID    [Held by provider] furosemide  40 mg IntraVENous Daily    sodium chloride flush  5-40 mL IntraVENous 2 times per day    famotidine (PEPCID) injection  20 mg IntraVENous BID    losartan  25 mg Oral Daily    enoxaparin  1 mg/kg SubCUTAneous Q12H    busPIRone  10 mg Oral TID    Vitamin D  2,000 Units Oral Daily No - Transfer Planned to listed location:  [] HOSPITALIST CONTACTED-      Case and plan discussed with Dr. Isela Baker. Electronically signed by Noel Fuentes DO  CRITICAL CARE SPECIALIST  Patient seen by me. Case discussed with resident physician. Italicized font represents changes to the note made by me. CC time 35 minutes. Time was discontiguous. Time does not include procedures. Time does include my direct assessment of the patient and coordination of care.   Electronically signed by Merly Johns MD on 12/21/2021 at 12:17 PM

## 2021-12-20 NOTE — PROGRESS NOTES
Bebe cnp here- updated on pt sats and restlessness since proning updated on current vent settings- order received

## 2021-12-21 NOTE — PROGRESS NOTES
1800 - pt proned without difficulty with RT and several RNs. Skin prep applied and foam preventive patches. 1805 - pt seems a little restless.  Will call for more sedation

## 2021-12-21 NOTE — PROGRESS NOTES
CRITICAL CARE PROGRESS NOTE      Patient:  Arturo Mark    Unit/Bed:4B-11/011-A  YOB: 1955  MRN: [id number]   PCP: Li Brantley  Date of Admission: 12/5/2021  Chief Complaint:- Shortness of breath      Assessment and Plan:       Acute hypoxic respiratory failure: This is secondary to COVID-19 pneumonia, pulmonary embolism, and superimposed bacterial pneumonia. Currently FiO2 80%, Peep 12 and Pcontrol 14. Patient required tube extchange last night for cuff leak, tolerating new tube well. - Continue lung protective strategies. .   - Wean as tolerated. COVID-19 Pneumonia: Patient was positive on 11/26/2021. He failed treatment was admitted on 12/5/2021. Patient ultimately progressed to BiPAP and failed. Was intubated 12/14/2021. He remains on Decadron. Superimposed Bacterial Pneumonia: Respiratory cultures growing enteric gram negative bacilli. Pneumonia molecular panel positive for e. Coli and Klebsiella aerogenes. Patient was started on Cipro 12/21/21 (day 1). Patient does remain without leukocytosis and mild temperature elevation without true fever. Severe ARDS: Proning therapy initiated     Pulmonary embolism (Saddle, no RV strain): Patient was diagnosed on 12/8/2021. He was started on therapeutic Lovenox. Interventional radiology does not feel that thrombectomy is indicated at this time. Baricitinib was discontinued. CHELSEA: Patient's creatinine bumped from 0.8 to 1.6 yesterday. Creatinine today improved to 0.8 with fluids. Urine studies negative. Patient continues to make urine. If patient develops oliguria, we will get nephrology on board however patient's CHELSEA is improving. History of hypertension: Continue home Lopressor (with holding parameters)      Bradycardia: Resolved. Was started on dopamine drip and this is improved. Dopamine drip discontinued. Pt able to maintain heart rate. Leukocytosis- Resolved: Pancultures remain negative.  Holding on antibiotics until cultures come back. Patient does have low-grade fever, likely secondary to COVID-19 pneumonia. INITIAL H AND P AND ICU COURSE:  Patient is a 66-year-old white male with only significant past medical history of hypertension who presented to [facility] ED on December 5 for complaints of progressive shortness of breath. Patient is unvaccinated against COVID-19. He was discharged from the [facility] emergency room on November 26 after being diagnosed with Covid. He was given Decadron at that time and discharged home on home oxygen. Patient continued to worsen which is when he came to the emergency room on December 5. He was found to have Covid pneumonia with acute respiratory failure with initiate high flow nasal cannula at 100%. Patient's pulse ox improved and he was started on Decadron as well baricitinib. Over the course of his hospital stay, patient's respiratory function worsened and he was subsequently transitioned to BiPAP. He eventually failed BiPAP. He also developed a saddle pulmonary embolism and was started on therapeutic Lovenox in the baricitinib was discontinued. On the day of transfer to the ICU patient became profoundly hypoxic on BiPAP and was in respiratory distress. He was transferred to the ICU for further management. He was intubated on December 14, 2021.  12/16/2021: No events overnight. Unable to wean on vent at this time. Not requiring any pressors at this time. Creatinine bumped up today. Will get urine studies. 12/17/2021: No events overnight. Remaining on stable ventilator settings. Creatinine improved with fluids. 12/20/21: Continues with high ventilatory settings FiO2 70% PEEP of 12. Patient was discontinued on baricitinib due to PE. Patient continued on therapeutic Lovenox. 12/21/21:  Currently FiO2 80%, Peep 12 and Pcontrol 14. Patient required tube extchange last night for cuff leak, tolerating new tube well.  Respiratory cultures growing enteric gram negative bacilli. Pneumonia molecular panel positive for e. Coli and Klebsiella aerogenes. Patient was started on Cipro 12/21/21 (day 1). Patient does remain without leukocytosis and mild temperature elevation without true fever. Past Medical History:  Per HPI   Social History: lifetime non smoker, no etoh, no evidence of illicit drug use       ROS   Unable to assess due to patient being intubated, sedated and on mechanical ventilation. Scheduled Meds:   ciprofloxacin  400 mg IntraVENous Q12H    aminoglycoside intermittent dosing (placeholder)   Other RX Placeholder    gentamicin  120 mg IntraVENous Q8H    insulin lispro  0-6 Units SubCUTAneous Q6H    dexamethasone  10 mg IntraVENous Q24H    metoprolol tartrate  50 mg Oral BID    [Held by provider] furosemide  40 mg IntraVENous Daily    sodium chloride flush  5-40 mL IntraVENous 2 times per day    famotidine (PEPCID) injection  20 mg IntraVENous BID    losartan  25 mg Oral Daily    enoxaparin  1 mg/kg SubCUTAneous Q12H    busPIRone  10 mg Oral TID    Vitamin D  2,000 Units Oral Daily    zinc sulfate  50 mg Oral Daily     Continuous Infusions:   propofol      dexmedetomidine 0.8 mcg/kg/hr (12/21/21 0500)    dextrose Stopped (12/16/21 0400)    sodium chloride 75 mL/hr at 12/20/21 1945    fentaNYL (SUBLIMAZE) 1250 mcg in sodium chloride 0.9 % 250 mL 200 mcg/hr (12/20/21 2203)    DOPamine Stopped (12/18/21 1746)    midazolam 10 mg/hr (12/21/21 0138)    norepinephrine Stopped (12/15/21 1542)    sodium chloride         PHYSICAL EXAMINATION:  T: 98.7. P: 68. B/P: 121/69. RR: 18. O2 Sat: 93.  I/O:  4.1/2.2  Body mass index is 32.31 kg/m². GCS:   3  Mode AC/PC: Set RR: 18: Actual RR: 18: Pcontrol: 18 PEEP: 10. FiO2:  60.  Pain/Sedation/paralytic:  Fentanyl, Versed, Precedex  General:   Acute on chronically ill male, intubated sedated mechanical ventilation  HEENT:  normocephalic and atraumatic. No scleral icterus.  PERR  Neck: supple. No Thyromegaly. Lungs: Diminished breath sounds throughout, clear to auscultation. No retractions  Cardiac: Regular rate and rhythm. No JVD. Abdomen: soft. Nontender. Bowel sounds present  Extremities:  No clubbing, no cyanosis. +1 nonpitting edema on upper extremities and lower extremities bilaterally. Vasculature: capillary refill < 3 seconds. Palpable dorsalis pedis pulses. Skin:  warm and dry. Psych:  Unable to assess due to patient being intubated, sedated and on mechanical ventilation. Lymph:  No supraclavicular adenopathy. Neurologic:  No focal deficit. No seizures. Weak gag reflex    Data: (All radiographs, tracings, PFTs, and imaging are personally viewed and interpreted unless otherwise noted). Sodium 142, potassium 4.5, chloride 110, CO2 24, BUN 33, creatinine 0.8, calcium 8.2  CRP 6.36  Albumin 2.0, alk phos 136  WBC 10.8, hemoglobin 10.7, hematocrit 34.5, platelets 998  Telemetry shows sinus rhythm  CXR reports mild progression of bilateral coarse infiltrates. Seen with multidisciplinary ICU team.  Meets Continued ICU Level Care Criteria:    [x] Yes   [] No - Transfer Planned to listed location:  [] HOSPITALIST CONTACTED-      Case and plan discussed with Dr. Linda Curiel. Electronically signed by Nura Mendez DO  CRITICAL CARE SPECIALIST  Patient seen by me. Case discussed with resident physician. Persistently critically ill on mechanical ventilator. On Cipro. .  Italicized font represents changes to the note made by me. CC time 35 minutes. Time was discontiguous. Time does not include procedures. Time does include my direct assessment of the patient and coordination of care.   Electronically signed by Lety Smith MD on 12/21/2021 at 6:14 PM

## 2021-12-21 NOTE — PROGRESS NOTES
Pharmacy Note  Aminoglycoside Consult    Karthik Sousa is a 77 y.o. male ordered gentamicin for PNA; consult received from Dr. Heidi Lee to manage therapy. Also receiving Cipro. Allergies:  Patient has no known allergies. Temp max: 100.2    Recent Labs     12/18/21  0400 12/18/21  0415 12/19/21  0510 12/20/21  0427   BUN   < >  --  58* 59*   CREATININE   < >  --  0.9 0.9   WBC  --  11.2*  --  9.7    < > = values in this interval not displayed. Intake/Output Summary (Last 24 hours) at 12/20/2021 2313  Last data filed at 12/20/2021 2038  Gross per 24 hour   Intake 3738.98 ml   Output 2625 ml   Net 1113.98 ml     Culture Date Source Results   12/16/21 UC NGTD   12/20/21 BC NGTD   12/20/21 Bronch. Lavage E. Coli, Klebsiella aerogenes      Height:   Ht Readings from Last 1 Encounters:   12/05/21 6' 1\" (1.854 m)     Weight:  Wt Readings from Last 1 Encounters:   12/19/21 231 lb 7.7 oz (105 kg)     Estimated Creatinine Clearance: 103 mL/min (based on SCr of 0.9 mg/dL). Assessment/Plan:  Will initiate gent as 120 mg (~1.5 mg/kg based on adj. BW; rounded down) Q8H for synergy (pt on Cipro). No gent levels ordered at this time but please consider a trough prior to 3rd dose. Pharmacy will continue to follow. Thank you for the consult. Imani Gil, Ph.D., R. Ph. 12/20/2021 11:18 PM

## 2021-12-21 NOTE — PROGRESS NOTES
0530- Pt was proned with the help of the RT and several nurses. Pt had an audible air cuff leak. Tidal volumes noted. Pt's 02 dropped to 88%. RT and this RN tried to reposition the pt to see if this would fix the air leak. [id number], RT, and Yusra RN here to assist in exchanging ETT tubes. 20 of Etomidate, 4 of versed, and a 50 mL bolus of fentanyl was given. ETT tube was placed successfully; size 8 at 24 cm at the lips. OG tube placed; size 16 Cameroonian. CXR was ordered to confirm placement of the ETT tube and OG tube. [facility]SI [facility]. Pt's vital signs stable at this time. This RN will continue to monitor.

## 2021-12-21 NOTE — FLOWSHEET NOTE
This  called Anton Webster wife who happened to be in his room visiting him. Words of encouragement given, as she stated she is \"doing as well as can be expected, Hanging in there. \"     12/21/21 1516   Encounter Summary   Services provided to: Family   Referral/Consult From: [facility]   Support System Spouse   Continue Visiting Yes  (12/21 F)   Complexity of Encounter Moderate   Length of Encounter 15 minutes   Spiritual/Presybeterian   Type Spiritual support   Care Plan:  Continue spiritual and emotional care for patient and family. Including prayers.

## 2021-12-21 NOTE — CARE COORDINATION
12/21/21, 12:58 PM EST    DISCHARGE ON GOING EVALUATION    [facility] day: 16  Location: [facility]11/011-A Reason for admit: COVID-19 [U07.1]   Procedure:   CXR Impression:   Mild progression bilateral coarse infiltrates. Barriers to Discharge: Intubated on ventilator. FiO2 80%, 60 liters oxygen, PEEP 12 with saturations at 95%. Tmax 100.2. TF via OG tube with Dietician following. Precedex, Fentanyl, Propofol drips. , Vitamin C,D,zinc, Decadron, Lovenox, Pepcid, diabetes management  PCP: Joss Sin  Readmission Risk Score: 15.1 ( )%  Patient Goals/Plan/Treatment Preferences: From home with spouse. Will follow.

## 2021-12-21 NOTE — PROCEDURES
ICU PROCEDURE - ENDOTRACHEAL INTUBATION    Oliva Russ     MRN#: [id number]  21      Moses [email or web address]@[employer]     : 1955      INDICATION: acute respiratory failure, hypoxia. Concerns of malfunction of ET tube balloon    TIME OUT: taken    Permission obtained, risks/benefits reviewed:    ANESTHESIA:   []Ketamine  []Ativan  [] Morphine  []Propofol  [x]Other medications: Versed/Etomidate      ESTIMATED BLOOD LOSS:  None. COMPLICATIONS:  [x]N/A  [] Other:    LARYNGOSCOPIC AIRWAY GRADE (CORMACK-LEHANE):[]1  [x]2a  []2b []3  []4        INTUBATION EQUIPMENT USED:  [x] Direct video laryngoscope only    OUTCOME: Successful placement of #  8.0  Taperguard Evac endotracheal via   [x]Oral route    INSERTION DEPTH:  24    cm from   [x]lip           CONFIRMATION OF TUBE POSITION:   [x]Capnography - Strong & repeatable exhaled CO2 detection   [x]Multiple point auscultation   [x]SpO2 response   [x]STAT X-ray   [x]Bronchoscopic assessment    UNUSUAL FINDINGS:   Attempted Bougie unable to pass. Needed to extubate and re-intubated. PROCEDURE:     Using direct video laryngoscopy, the vocal cords were visualized and the endotracheal tube was placed through the cords under direct vision. Good breath sounds were auscultated bilaterally without sounds over abdomen. Appropriate strong & repeatable exhaled CO2 detection was confirmed.        Electronically signed by EDGAR Gonzalez CNP on 2021 at 6:24 AM

## 2021-12-21 NOTE — PROGRESS NOTES
This RN called and updated pt's wife, Waldo Mckenna, on pt's condition. All questions and concerns were addressed and answered at this time.

## 2021-12-22 NOTE — CARE COORDINATION
Discharge Planning Update: Following for Covid. Cont vented with FIO2 65% and Peep of 10. Sat 94%. Fentanyl, Diprivan, Versed, Precedex. Afebrile. BP runs low 102/61. ET replaced yesterday due to concern of malfunction of balloon. Resp culture shows e-coli and Klebsiella. Cipro initiated. Plans home with spouse. Needs pending med progression.

## 2021-12-22 NOTE — PROGRESS NOTES
Call placed to patients wife Laurita Alba. Updated on patient status and current plan of care. All questions answered at this time.

## 2021-12-22 NOTE — PROGRESS NOTES
Albumin 1. 8. Liekly due to increased vascular permeability from effects of cytokines TNF alpha, IL6 and chemokines, which are well described as a part of covid infection, however it is also exacerbated by endotoxins from gram his gram negative bacterial infections, e. Coli and klebsiella. -Transfuse 25g albumin    History of hypertension: Continue home Lopressor (with holding parameters)      Leukocytosis- Resolved: Pancultures remain negative. Holding on antibiotics until cultures come back. Patient does have low-grade fever, likely secondary to COVID-19 pneumonia. CHELSEA: Resolved. Creatinine today improved to 0.7 with fluids. Urine studies negative. Patient continues to make urine. If patient develops oliguria, we will get nephrology on board however patient's CHELSEA is improving. INITIAL H AND P AND ICU COURSE:  Patient is a 22-year-old white male with only significant past medical history of hypertension who presented to [facility] ED on December 5 for complaints of progressive shortness of breath. Patient is unvaccinated against COVID-19. He was discharged from the [facility] emergency room on November 26 after being diagnosed with Covid. He was given Decadron at that time and discharged home on home oxygen. Patient continued to worsen which is when he came to the emergency room on December 5. He was found to have Covid pneumonia with acute respiratory failure with initiate high flow nasal cannula at 100%. Patient's pulse ox improved and he was started on Decadron as well baricitinib. Over the course of his hospital stay, patient's respiratory function worsened and he was subsequently transitioned to BiPAP. He eventually failed BiPAP. He also developed a saddle pulmonary embolism and was started on therapeutic Lovenox in the baricitinib was discontinued. On the day of transfer to the ICU patient became profoundly hypoxic on BiPAP and was in respiratory distress.   He was transferred to the ICU for further management. He was intubated on December 14, 2021.  12/16/2021: No events overnight. Unable to wean on vent at this time. Not requiring any pressors at this time. Creatinine bumped up today. Will get urine studies. 12/17/2021: No events overnight. Remaining on stable ventilator settings. Creatinine improved with fluids. 12/20/21: Continues with high ventilatory settings FiO2 70% PEEP of 12. Patient was discontinued on baricitinib due to PE. Patient continued on therapeutic Lovenox. 12/21/21:  Currently FiO2 80%, Peep 12 and Pcontrol 14. Patient required tube extchange last night for cuff leak, tolerating new tube well. Respiratory cultures growing enteric gram negative bacilli. Pneumonia molecular panel positive for e. Coli and Klebsiella aerogenes. Patient was started on Cipro 12/21/21 (day 1). Patient does remain without leukocytosis and mild temperature elevation without true fever. 12/22/21: NPO due to vomiting and high residuals. Urinating adequately however significantly edematous will give albumin and lasix. Vent support decreased FiO2 60%, Peep 8 and Pcontrol 14. Day 2 cipro for e. Coli and Klebsiella aerogenes pneumonia. Afebrile without leukocytosis. Past Medical History:  Per HPI   Social History: lifetime non smoker, no etoh, no evidence of illicit drug use       ROS   Unable to assess due to patient being intubated, sedated and on mechanical ventilation.      Scheduled Meds:   ciprofloxacin  400 mg IntraVENous Q12H    insulin lispro  0-6 Units SubCUTAneous Q6H    dexamethasone  10 mg IntraVENous Q24H    metoprolol tartrate  50 mg Oral BID    [Held by provider] furosemide  40 mg IntraVENous Daily    sodium chloride flush  5-40 mL IntraVENous 2 times per day    famotidine (PEPCID) injection  20 mg IntraVENous BID    losartan  25 mg Oral Daily    enoxaparin  1 mg/kg SubCUTAneous Q12H    busPIRone  10 mg Oral TID    Vitamin D  2,000 Units Oral Daily zinc sulfate  50 mg Oral Daily     Continuous Infusions:   propofol 25 mcg/kg/min (12/22/21 0341)    dexmedetomidine 0.8 mcg/kg/hr (12/22/21 0341)    dextrose Stopped (12/16/21 0400)    sodium chloride 75 mL/hr at 12/22/21 0341    fentaNYL (SUBLIMAZE) 1250 mcg in sodium chloride 0.9 % 250 mL 175 mcg/hr (12/22/21 0341)    DOPamine Stopped (12/18/21 1746)    midazolam 10 mg/hr (12/22/21 0341)    norepinephrine Stopped (12/15/21 1542)    sodium chloride         PHYSICAL EXAMINATION:  T: 98.3. P: 57. B/P: 113/65. RR: 18. O2 Sat: 95.  I/O:  7.0/2.2  Body mass index is 32.87 kg/m². GCS:   3   Mode AC/PC: Set RR: 18: Actual RR: 18 8: Pcontrol: 14 PEEP: 8. FiO2: 60.  Pain/Sedation/paralytic:  Fentanyl, propofol, Versed, Precedex  General: Acute on chronically ill male, intubated sedated mechanical ventilation  HEENT:  normocephalic and atraumatic. No scleral icterus. PERR  Neck: supple. No Thyromegaly. Lungs: Diminished breath sounds throughout. Clear to auscultation. No retractions  Cardiac: bradycardic rate . No JVD. Abdomen: soft. Nontender. Bowel sounds present   Extremities:  No clubbing, no cyanosis. +2 BLE edema. Vasculature: capillary refill < 3 seconds. Palpable dorsalis pedis pulses. Skin:  warm and dry. Psych:  Unable to assess due to patient being intubated, sedated and on mechanical ventilation. Lymph:  No supraclavicular adenopathy. Neurologic:  No focal deficit. No seizures. Data: (All radiographs, tracings, PFTs, and imaging are personally viewed and interpreted unless otherwise noted).    Sodium 141, potassium 4.9, chloride 110, CO2 27, BUN 47, range of 7 calcium 8.1  Albumin 1.8  WBC 10.2, hemoglobin 10.0, hematocrit 32.9, platelets 865  Telemetry shows sinus bradycardia        Seen with multidisciplinary ICU team.  Meets Continued ICU Level Care Criteria:    [x] Yes   [] No - Transfer Planned to listed location:  [] HOSPITALIST CONTACTED-      Case and plan discussed with  Angeles Jesus        Electronically signed by Robert Lindsey DO  CRITICAL CARE SPECIALIST  Patient seen by me. Case discussed with resident physician. PC down to 14. Still requiring 60% FiO2. .  Italicized font represents changes to the note made by me. CC time 35 minutes. Time was discontiguous. Time does not include procedures. Time does include my direct assessment of the patient and coordination of care.   Electronically signed by Marielle Bryan MD on 12/23/2021 at 6:41 AM

## 2021-12-23 NOTE — PROGRESS NOTES
Call placed to patient's wife, Bethany Mchugh. Updated on patient condition and POC. Mikayla verbalized understanding and denied any further questions. Patient's wife and family requesting repeat ABG to see if pronation can be stopped.

## 2021-12-23 NOTE — PROGRESS NOTES
Comprehensive Nutrition Assessment    Type and Reason for Visit:  Reassess (tubefeeding)    Nutrition Recommendations/Plan:   -Recommend scheduled bowel medications, pt with only a smear BM 12/23, last good BM 12/15/21. PRN glycolax only given 12/16, 12/20, 12/22, RN plans to give again today. Note vomiting 12/22/21 and higher TF residuals.  -When appropriate recommend resume TF's: Vital 1.2 at 15 ml/hr, increase 10 ml/hr every 8 hours as tolerated to goal of 65 ml/hr.   -Bolus 2.5 oz proteinex2 Go (protein modular) BID.  -Additional free water flush per MD.    Nutrition Assessment:   Patient declining from a nutritional standpoint AEB tubefeedings stopped 12/22/21 d/t vomiting and higher TF residuals (220 mls). Remains at risk for further nutritional compromise related continued NPO status, constipation, + COVID diagnosis 12/2 (s/s 11/29), acute respiratory failure, intubated 12/14/21, pulmonary embolism, ARDS, pronation therapy, variable meal intake earlier in admission, skin integrity issues, and underlying medical condition (hx HTN, obesity). Nutrition recommendations/ interventions as per above. Malnutrition Assessment:  Malnutrition Status:  Insufficient data    Context:  Acute Illness     Findings of the 6 clinical characteristics of malnutrition:  Energy Intake:  Unable to assess  Weight Loss:  Unable to assess (no weight records per EMR)     Body Fat Loss:  Unable to assess     Muscle Mass Loss:  Unable to assess    Fluid Accumulation:  Unable to assess     Strength:  Not Performed    Estimated Daily Nutrient Needs:  Energy (kcal):  9978-1829 kcals (30-32) LATE PHASE; Weight Used for Energy Requirements:  Ideal (84kgm)     Protein (g):  168 grams (2); Weight Used for Protein Requirements:  Ideal (84kgm)        Fluid (ml/day):  as per Doctor; Method Used for Fluid Requirements:  Other (Comment)      Nutrition Related Findings:   Patient remains intubated. Pronation therapy.  TF's stopped 12/22/21 d/t vomiting and higher TF residuals (220 mls). Last good BM 12/15/21, smear on 12/23/21. PRN glycolax given 12/16, 12/20, 12/22-discussed with RN to give today and ask MD about scheduled bowel meds. 12/23/21: Sodium 141, Potassium 4.5, BUN 35, Creatinine 0.7, Glucose 86, CRP 4.72, . Rx: Cipro IV, humalog, vitamin D, zincate, lopressor, cozaar, lovenox, IVF at 75, fentanyl, versed, precedex, ketamine. Wounds:   (buttocks: stage I and DTI)       Current Nutrition Therapies:    Diet NPO  Current Tube Feeding (TF) Orders:  · Feeding Route: Orogastric  · Formula: Peptide Based (Vital 1.2)  · Schedule: Continuous  · Additives/Modulars: Protein (2.5 oz [id number] BID)  · Water Flushes: as per Doctor  · Current TF & Flush Orders Provides: on hold d/t vomiting, higher residuals 12/22/21, constipation  · Goal TF & Flush Orders Provides: Vital 1.2 at 65 ml/hr with protein modular BID~ 2080 kcals (Amedeo Rodrigo TF, 208 protein modular), 169 grams protein (117 TF, 52 protein modular), 173 grams CHO, 8 grams fiber, 1265 mls water, 1710 mls volume (1560 mls TF, 150 mls protein modular)/24 hours. Additional Calorie Sources:   Propofol stopped 12/15/21, restarted 12/21/21, then stopped again 12/23/21. Anthropometric Measures:  · Height: 6' 1\" (185.4 cm)  · Current Body Weight: 238 lb 1.6 oz (108 kg) (12/23/21 +1 LE edema)   · Admission Body Weight: 232 lb 3.2 oz (105.3 kg) (12/5; no edema)    · Usual Body Weight:  (no weight records per EMR)     · Ideal Body Weight: 184 lbs;   · BMI: 31.4  · Adjusted Body Weight:  ; No Adjustment   · BMI Categories: Obese Class 1 (BMI 30.0-34. 9)       Nutrition Diagnosis:   · Inadequate oral intake related to impaired respiratory function as evidenced by NPO or clear liquid status due to medical condition,intubation      Nutrition Interventions:   Food and/or Nutrient Delivery:  Continue NPO (Recommend resume tubefeedings when able)  Nutrition Education/Counseling:  No recommendation at this time   Coordination of Nutrition Care:  Continue to monitor while inpatient    Goals:  Tube feeding to meet % nutrient needs appropriate for COVID recovery process while intubated       Nutrition Monitoring and Evaluation:   Behavioral-Environmental Outcomes:  None Identified   Food/Nutrient Intake Outcomes:  Enteral Nutrition Intake/Tolerance  Physical Signs/Symptoms Outcomes:  Biochemical Data,Fluid Status or Edema,Hemodynamic Status,Nutrition Focused Physical Findings,Skin,Weight,GI Status     Discharge Planning:     Too soon to determine     Electronically signed by Arron Byrd RD, LD on 12/23/21 at 3:39 PM EST    Contact: [phone]

## 2021-12-23 NOTE — PROGRESS NOTES
CRITICAL CARE PROGRESS NOTE      Patient:  Carmelo Rizo    Unit/Bed:4B-11/011-A  YOB: 1955  MRN: [id number]   PCP: Beck Talavera  Date of Admission: 12/5/2021  Chief Complaint:- Shortness of breath      Assessment and Plan:       Acute hypoxic respiratory failure: This is secondary to COVID-19 pneumonia, pulmonary embolism, and superimposed bacterial pneumonia. Currently FiO2 60%, Peep 8 and Pcontrol 14. Patient required tube extchange last night for cuff leak, tolerating new tube well 12/21/21. Patient is very edematous with net + 19 L patient received Lasix yesterday (12/22/21)              - Continue lung protective strategies. - Wean as tolerated. COVID-19 Pneumonia: Patient was positive on 11/26/2021. He failed treatment was admitted on 12/5/2021. Patient ultimately progressed to BiPAP and failed. Was intubated 12/14/2021. He remains on Decadron. Superimposed Bacterial Pneumonia: Respiratory cultures growing enteric gram negative bacilli. Pneumonia molecular panel positive for e. Coli and Klebsiella aerogenes. Patient was started on Cipro 12/21/21 (day 2). Patient does remain without leukocytosis and mild temperature elevation without true fever. Patient vomited 12/22/21 signficant concern for aspiration. Will for additional need of antibiotic coverage to RUSTn for aspiration pneumonia. Would not add at this time monitor his for typical aspiration pneumonia is a pneumonitis. Severe ARDS: Proning therapy initiated     Pulmonary embolism (Saddle, no RV strain): Patient was diagnosed on 12/8/2021. He was started on therapeutic Lovenox. Interventional radiology does not feel that thrombectomy is indicated at this time. Baricitinib was discontinued. Bradycardia:. Was started on dopamine drip and this is improved. Dopamine drip discontinued. Pt able to maintain heart rate. Likely due to precidex. Hypoalbuminemia: Albumin 1. 8. Liekly due to increased vascular permeability from effects of cytokines TNF alpha, IL6 and chemokines, which are well described as a part of covid infection, however it is also exacerbated by endotoxins from gram his gram negative bacterial infections, e. Coli and klebsiella. Current albumin 2.3, 12/23/21.              -Transfuse 25g albumin    Hypertriglyceridemia: Secondary to profile use. Will discontinue Propofol at this time and start ketamine drip. History of hypertension: Continue home Lopressor (with holding parameters)      Leukocytosis- Resolved: Pancultures remain negative. Holding on antibiotics until cultures come back. Patient does have low-grade fever, likely secondary to COVID-19 pneumonia. CHELSEA: Resolved. Creatinine today improved to 0.7 with fluids. Urine studies negative. Patient continues to make urine. If patient develops oliguria, we will get nephrology on board however patient's CHELSEA is improving. INITIAL H AND P AND ICU COURSE:  Patient is a 66-year-old white male with only significant past medical history of hypertension who presented to [facility] ED on December 5 for complaints of progressive shortness of breath. Patient is unvaccinated against COVID-19. He was discharged from the [facility] emergency room on November 26 after being diagnosed with Covid. He was given Decadron at that time and discharged home on home oxygen. Patient continued to worsen which is when he came to the emergency room on December 5. He was found to have Covid pneumonia with acute respiratory failure with initiate high flow nasal cannula at 100%. Patient's pulse ox improved and he was started on Decadron as well baricitinib. Over the course of his hospital stay, patient's respiratory function worsened and he was subsequently transitioned to BiPAP. He eventually failed BiPAP. He also developed a saddle pulmonary embolism and was started on therapeutic Lovenox in the baricitinib was discontinued.   On the day of transfer to the ICU patient became profoundly hypoxic on BiPAP and was in respiratory distress. He was transferred to the ICU for further management. He was intubated on December 14, 2021.  12/16/2021: No events overnight. Unable to wean on vent at this time. Not requiring any pressors at this time. Creatinine bumped up today. Will get urine studies. 12/17/2021: No events overnight. Remaining on stable ventilator settings. Creatinine improved with fluids. 12/20/21: Continues with high ventilatory settings FiO2 70% PEEP of 12. Patient was discontinued on baricitinib due to PE. Patient continued on therapeutic Lovenox. 12/21/21:  Currently FiO2 80%, Peep 12 and Pcontrol 14. Patient required tube extchange last night for cuff leak, tolerating new tube well. Respiratory cultures growing enteric gram negative bacilli. Pneumonia molecular panel positive for e. Coli and Klebsiella aerogenes. Patient was started on Cipro 12/21/21 (day 1). Patient does remain without leukocytosis and mild temperature elevation without true fever. 12/22/21: NPO due to vomiting and high residuals. Urinating adequately however significantly edematous will give albumin and lasix. Vent support decreased FiO2 60%, Peep 8 and Pcontrol 14. Day 2 cipro for e. Coli and Klebsiella aerogenes pneumonia. Afebrile without leukocytosis. 12/23/21: New leukocytosis, without fever, day 3 ciprofloxacin. Respiratory culture growing E. Coli. Pneumonia molecular panel growing E. coli as well as Klebsiella aeruginosa. Patient continues to require significant amount of ventilatory support FiO2 70%, PEEP 8, pressure control 14. , DC propofol and start ketamine drip. Family requesting ABG     Past Medical History:  Per HPI   Social History: lifetime non smoker, no etoh, no evidence of illicit drug use       ROS   Unable to assess due to patient being intubated, sedated and on mechanical ventilation.      Scheduled Meds: ciprofloxacin  400 mg IntraVENous Q12H    insulin lispro  0-6 Units SubCUTAneous Q6H    metoprolol tartrate  50 mg Oral BID    [Held by provider] furosemide  40 mg IntraVENous Daily    sodium chloride flush  5-40 mL IntraVENous 2 times per day    famotidine (PEPCID) injection  20 mg IntraVENous BID    losartan  25 mg Oral Daily    enoxaparin  1 mg/kg SubCUTAneous Q12H    busPIRone  10 mg Oral TID    Vitamin D  2,000 Units Oral Daily    zinc sulfate  50 mg Oral Daily     Continuous Infusions:   propofol 40 mcg/kg/min (12/23/21 1225)    dexmedetomidine Stopped (12/22/21 2250)    dextrose Stopped (12/16/21 0400)    sodium chloride 75 mL/hr at 12/23/21 0543    fentaNYL (SUBLIMAZE) 1250 mcg in sodium chloride 0.9 % 250 mL 150 mcg/hr (12/23/21 0600)    DOPamine Stopped (12/18/21 1746)    midazolam 8 mg/hr (12/23/21 0600)    norepinephrine Stopped (12/15/21 1542)    sodium chloride         PHYSICAL EXAMINATION:  T: 98.6. B/P: 140/71. P: 96.    RR: 21. O2 Sat: 94.  I/O:  7.0/2.2  Body mass index is 31.41 kg/m². GCS:   3  Mode: AC/\PC set RR: 18 actual RR: 21: Pcontrol: 14  PEEP: 8. FiO2: 70.  Pain/Sedation/paralytic:  Fentanyl, propofol and Versed  General:   Acute on chronically ill male, intubated sedated on mechanical ventilation  HEENT:  normocephalic and atraumatic. No scleral icterus. PERR  Neck: supple. No Thyromegaly. Lungs: Diminished breath sounds throughout all lung fields, however clear to auscultation. No retractions  Cardiac: RRR. No JVD. Abdomen: soft. Nontender. Active bowel sounds  Extremities:  No clubbing, no cyanosis. +2 lower extremity edema bilaterally  Vasculature: capillary refill < 3 seconds. Palpable dorsalis pedis pulses. Skin:  warm and dry. Psych:  Unable to assess due to patient being intubated, sedated and on mechanical ventilation. Lymph:  No supraclavicular adenopathy. Neurologic:  No focal deficit. No seizures.     Data: (All radiographs, tracings, PFTs, and imaging are personally viewed and interpreted unless otherwise noted). Sodium 141, potassium 4.5, chloride 105, CO2 27, BUN 35 mg 0.5, magnesium 1.8, serum calcium 8.6  CRP 4.72, triglycerides 333  Albumin 2.3, alk phos 133  WBC 14.4, hemoglobin 11.2, hematocrit 35.2, platelets 031  Telemetry shows normal sinus rhythm        Seen with multidisciplinary ICU team.  Meets Continued ICU Level Care Criteria:    [x] Yes   [] No - Transfer Planned to listed location:  [] HOSPITALIST CONTACTED-      Case and plan discussed with Dr. Cm Hudson. Electronically signed by Eufemia Padilla DO  CRITICAL CARE SPECIALIST  Patient seen by me. Case discussed with resident physician. Unable to wean from mechanical ventilator secondary to respiratory fatigue and high FiO2 requirements. Undergoing diuresis. .  Italicized font represents changes to the note made by me. CC time 35 minutes. Time was discontiguous. Time does not include procedures. Time does include my direct assessment of the patient and coordination of care.   Electronically signed by Cristal Villaseñor MD on 12/23/2021 at 5:44 PM

## 2021-12-23 NOTE — CARE COORDINATION
[address] day: 18     Barriers to Discharge: remains on vent. FiO2 decreased 60%, PEEP 10,  sats 93%. NPO /vomited, high residuals. Albumin and Lasix IV. Afebrile. Heart rate 55 - 108/min, on Fentanyl/Propofol/Versed gtts. CRP 4.72. triglycerides 333. WBC 14.4. IVF 75/hr, Cipro IV day #3 for e. Coli and Klebsiella aerogenes pneumonia. Patient Goals/Plan/Treatment Preferences: from home with spouse; will follow for needs.

## 2021-12-24 NOTE — PROGRESS NOTES
Comprehensive Nutrition Assessment    Type and Reason for Visit:  Reassess,Consult (tubefeeding ordering and management)    Nutrition Recommendations/Plan:   -Recommend scheduled bowel medications, pt with only a smear BM 12/23, last good BM 12/15/21. PRN glycolax only given 12/16, 12/20, 12/22, RN plans to give again today. Note vomiting 12/22/21 and higher TF residuals.  -When patient medically stable/appropriate recommend resume TF's: Vital 1.2 at 15 ml/hr, increase 10 ml/hr every 8 hours as tolerated to goal of 65 ml/hr.   -Bolus 2.5 oz proteinex2 Go (protein modular) BID.  -Additional free water flush per MD.    Nutrition Assessment:    Patient with no improvement from a nutritional standpoint AEB tubefeedings stopped 12/22/21 d/t vomiting and higher TF residuals (220 mls) and remain off and now not medically stable enough to consider tubefeeding initiation. Remains at risk for further nutritional compromise related continued NPO status, constipation, + COVID diagnosis 12/2 (s/s 11/29), acute respiratory failure, intubated 12/14/21, pulmonary embolism, ARDS, pronation therapy, variable meal intake earlier in admission, skin integrity issues, and underlying medical condition (hx HTN, obesity). Nutrition recommendations/ interventions as per above.     Malnutrition Assessment:  Malnutrition Status:  Insufficient data    Context:  Acute Illness     Findings of the 6 clinical characteristics of malnutrition:  Energy Intake:  Unable to assess  Weight Loss:  Unable to assess (no weight records per EMR)     Body Fat Loss:  Unable to assess     Muscle Mass Loss:  Unable to assess    Fluid Accumulation:  Unable to assess     Strength:  Not Performed    Estimated Daily Nutrient Needs:  Energy (kcal):  2309-7650 kcals (30-32) LATE PHASE; Weight Used for Energy Requirements:  Ideal (84kgm)     Protein (g):  168 grams (2); Weight Used for Protein Requirements:  Ideal (84kgm)        Fluid (ml/day):  as per Doctor; Method Used for Fluid Requirements:  Other (Comment)      Nutrition Related Findings:   Patient remains intubated. Multiple nursing staff in patient's room at this time, tachy, hypoxic, pressors and paralytic starting, not medically stable for tubefeeding initiation consideration. No improvement with bowel movement either, smear 12/23/21, last good BM 12/15/21. Vomiting and higher TF residuals 12/22/21 and thus TF's stopped. Pt had been at TF goal 12/21/21. Pronation therapy. MAP 90.  12/24/21: Sodium 136, Potassium 4, BUN 31, Creatinine 0.6, Mg 1.6, Glucose  74, CRP 13.09, Alk Phos 203, , , Bili 1.4. Rx: janak-synephrine, nimbex, amiodarone, precedex, fentanyl, ketamine, versed, Cipro IV, pepcid, humalog, lopressor, cozaar, vitamin D, zincate, IVF at 76, glycolax prn-given 12/16, 12/20, 12/22, 12/23. Wounds:   (buttocks: stage I and DTI)       Current Nutrition Therapies:    Diet NPO  Current Tube Feeding (TF) Recommendations:  · Feeding Route: Orogastric  · Formula: Peptide Based (Vital 1.2)  · Schedule: Continuous  · Additives/Modulars: Protein (2.5 oz [id number] BID)  · Water Flushes: as per Doctor  · Current TF & Flush Orders Provides: stopped d/t vomiting, higher residuals 12/22/21, constipation, 12/24/21-not medically stable enough for tubefeeding initiation  · Goal TF & Flush Orders Provides: Vital 1.2 at 65 ml/hr with protein modular BID~ 2080 kcals (Birdie Record TF, 208 protein modular), 169 grams protein (117 TF, 52 protein modular), 173 grams CHO, 8 grams fiber, 1265 mls water, 1710 mls volume (1560 mls TF, 150 mls protein modular)/24 hours.     Additional Calorie Sources:   Propofol stopped 12/15/21    Anthropometric Measures:  · Height: 6' 1\" (185.4 cm)  · Current Body Weight: 233 lb 11 oz (106 kg) (12/24/21 +1 LE edema)   · Admission Body Weight: 232 lb 3.2 oz (105.3 kg) (12/5; no edema)    · Usual Body Weight:  (no weight records per EMR)     · Ideal Body Weight: 184 lbs;   · BMI: 30.8  · Adjusted Body Weight:  ; No Adjustment   · BMI Categories: Obese Class 1 (BMI 30.0-34. 9)       Nutrition Diagnosis:   · Inadequate oral intake related to impaired respiratory function as evidenced by NPO or clear liquid status due to medical condition,intubation      Nutrition Interventions:   Food and/or Nutrient Delivery:  Continue NPO (Recommend resume tubefeedings when able)  Nutrition Education/Counseling:  No recommendation at this time   Coordination of Nutrition Care:  Continue to monitor while inpatient    Goals:  Tube feeding to meet % nutrient needs appropriate for COVID recovery process while intubated       Nutrition Monitoring and Evaluation:   Behavioral-Environmental Outcomes:  None Identified   Food/Nutrient Intake Outcomes:  IVF Intake,Enteral Nutrition Intake/Tolerance  Physical Signs/Symptoms Outcomes:  Biochemical Data,Fluid Status or Edema,Hemodynamic Status,Nutrition Focused Physical Findings,Skin,Weight,GI Status     Discharge Planning:     Too soon to determine     Electronically signed by Sarahy Fragoso RD, LD on 12/24/21 at 10:03 AM EST    Contact: [phone]

## 2021-12-24 NOTE — PROGRESS NOTES
At 8:35am, this RN pulled an amio drip and a vial of versed out of the omnicell for Andry, the day shift nurse. Medication was hung in the room, ready to be hung.

## 2021-12-24 NOTE — PROGRESS NOTES
This RN updated the wife and the daughter on the phone at this time and answered all questions about the plan of care

## 2021-12-24 NOTE — PROGRESS NOTES
CRITICAL CARE PROGRESS NOTE      Patient:  Mert Laguerre    Unit/Bed:4B-11/011-A  YOB: 1955  MRN: [id number]   PCP: Elliot Montalvo  Date of Admission: 12/5/2021  Chief Complaint:- Shortness of breath      Assessment and Plan:       Acute hypoxic respiratory failure: This is secondary to COVID-19 pneumonia, pulmonary embolism, and superimposed bacterial pneumonia. Currently FiO2 60%, Peep 8 and Pcontrol 14. Patient required tube extchange last night for cuff leak, tolerating new tube well 12/21/21. Patient is very edematous with net + 20 L patient received Lasix  (12/22/21)               - Continue lung protective strategies. - Wean as tolerated. New onset Afib: Patient found to be in atrial fibrillation with rapid ventricular response early this morning, 12/24/21. Patient had significant hemodynamic compromise with blood pressure 75/48 and that persistently less than 60, respiratory status is also significantly decompensated. Patient underwent synchronized cardioversion at 360 J however was unsuccessful. Patient was subsequently given amiodarone bolus followed by drip. Throughout the morning and early afternoon patient continued to be in atrial fibrillation with elevated heart rate at 155. At that time patient was synchronized cardioverted x2 at 360 J. Successfully. Patient now in normal sinus rhythm with a heart rate of 108.    -Continue amiodarone drip    Hypermetabolic Syndrome: [id number] infectinon increase his basal metabolic rate by x3. .  This results in increased need for medication most notable increase in sedation. This is demonstrated by patient's continuous increase dosage as well as additional sedation.   - Will initiate cooling patient with a target temperature of 35 to 60 °C-for 72 hours. - Patient is currently on Nimbex therefore I do not feel shivering is not a concern      COVID-19 Pneumonia: Patient was positive on 11/26/2021.   He failed treatment was admitted on 12/5/2021. Patient ultimately progressed to BiPAP and failed. Was intubated 12/14/2021. He remains on Decadron. Superimposed Bacterial Pneumonia: Respiratory cultures growing enteric gram negative bacilli. Pneumonia molecular panel positive for e. Coli and Klebsiella aerogenes. Patient was started on Cipro 12/21/21 (day 3). Patient does remain without leukocytosis and mild temperature elevation without true fever. Patient vomited 12/22/21 signficant concern for aspiration. Will for additional need of antibiotic coverage to Zosyn for aspiration pneumonia. Would not add at this time monitor his for typical aspiration pneumonia is a pneumonitis. Abnormal LFTs: New onset. Calculated R factor = 1.1, this is less than 2 demonstrating cholestatic pattern of liver injury. - We will proceed with abdominal ultrasound at this time. Severe ARDS: Proning therapy initiated     Pulmonary embolism (Saddle, no RV strain): Patient was diagnosed on 12/8/2021. He was started on therapeutic Lovenox. Interventional radiology does not feel that thrombectomy is indicated at this time. Baricitinib was discontinued. Leukocytosis- WBC 12.3. Pancultures remain negative. Holding on antibiotics until cultures come back. Patient does have low-grade fever, likely secondary to COVID-19 pneumonia. Hypoalbuminemia: Improving. Current Albumin 2.2 . Liekly due to increased vascular permeability from effects of cytokines TNF alpha, IL6 and chemokines, which are well described as a part of covid infection, however it is also exacerbated by endotoxins from gram his gram negative bacterial infections, e. Coli and klebsiella. Transfused 25g albumin x1 (12/22)    -Monitor with daily CMP     Hypertriglyceridemia: Secondary to propofol use. Will discontinue Propofol at this time and start ketamine drip. History of hypertension: Continue home Lopressor (with holding parameters)      CHELSEA: Resolved.  Creatinine today improved to 0.7 with fluids. Urine studies negative. Patient continues to make urine. If patient develops oliguria, we will get nephrology on board however patient's CHELSEA is improving. Bradycardia: Resolved. Was started on dopamine drip and this is improved. Dopamine drip discontinued. Pt able to maintain heart rate. Likely due to precidex. INITIAL H AND P AND ICU COURSE:  Patient is a 40-year-old white male with only significant past medical history of hypertension who presented to [facility] ED on December 5 for complaints of progressive shortness of breath. Patient is unvaccinated against COVID-19. He was discharged from the [facility] emergency room on November 26 after being diagnosed with Covid. He was given Decadron at that time and discharged home on home oxygen. Patient continued to worsen which is when he came to the emergency room on December 5. He was found to have Covid pneumonia with acute respiratory failure with initiate high flow nasal cannula at 100%. Patient's pulse ox improved and he was started on Decadron as well baricitinib. Over the course of his hospital stay, patient's respiratory function worsened and he was subsequently transitioned to BiPAP. He eventually failed BiPAP. He also developed a saddle pulmonary embolism and was started on therapeutic Lovenox in the baricitinib was discontinued. On the day of transfer to the ICU patient became profoundly hypoxic on BiPAP and was in respiratory distress. He was transferred to the ICU for further management. He was intubated on December 14, 2021.  12/16/2021: No events overnight. Unable to wean on vent at this time. Not requiring any pressors at this time. Creatinine bumped up today. Will get urine studies. 12/17/2021: No events overnight. Remaining on stable ventilator settings. Creatinine improved with fluids. 12/20/21: Continues with high ventilatory settings FiO2 70% PEEP of 12.  Patient was discontinued on baricitinib due to PE. Patient continued on therapeutic Lovenox. 12/21/21:  Currently FiO2 80%, Peep 12 and Pcontrol 14. Patient required tube extchange last night for cuff leak, tolerating new tube well. Respiratory cultures growing enteric gram negative bacilli. Pneumonia molecular panel positive for e. Coli and Klebsiella aerogenes. Patient was started on Cipro 12/21/21 (day 1). Patient does remain without leukocytosis and mild temperature elevation without true fever. 12/22/21: NPO due to vomiting and high residuals. Urinating adequately however significantly edematous will give albumin and lasix. Vent support decreased FiO2 60%, Peep 8 and Pcontrol 14. Day 2 cipro for e. Coli and Klebsiella aerogenes pneumonia. Afebrile without leukocytosis. 12/23/21: New leukocytosis, without fever, day 3 ciprofloxacin. Respiratory culture growing E. Coli. Pneumonia molecular panel growing E. coli as well as Klebsiella aeruginosa. Patient continues to require significant amount of ventilatory support FiO2 70%, PEEP 8, pressure control 14. , DC propofol and start ketamine drip. Family requesting ABG        12/24/21:  Called to bedside at 0730, immediately at bedside patient spO2 found to be 84% and patient was also tachycardic. Physical exam demonstrated ventilator dyssynchrony. At that time sedation was increased to maximal capacity. Patient then went into atrial fibrillation with a ventricular response, followed by significant unstable hemodynamics with BP 75/48 and persistent MAP less than 60. His respiratory status continue to compensate as well. Attempted synchronized cardioversion at that time with 360 J however was unsuccessful. Patient started on Nimbex bolus for ventilator synchrony as well as given amiodarone bolus followed by drip. Patient continued to be in atrial fibrillation with RVR throughout the morning.   Reattempted synchronized cardioversion at 130 2nd administration of 360 was successful. Patient currently in normal sinus rhythm with a heart rate of 106. Increased ventilatory requirements throughout the night and during the day currently on AC/PC mode Pcontrol: 12 PEEP: 14. FiO2: 100%. Patient also has had significant increase in sedation requirements, this is highly indicative of hyper metabolic syndrome secondary to COVID-19. Will initiate cooling patient at this time. Patient also has abnormal LFTs. R factor 1.1 demonstrating likely cholestatic injury. Abdominal ultrasound ordered. T-max 99.6. Patient currently on day 4 of Cipro with WBC 12.34 E. coli pneumonia. Past Medical History:  Per HPI   Social History: lifetime non smoker, no etoh, no evidence of illicit drug use       ROS   Unable to assess due to patient being intubated, sedated and on mechanical ventilation. Scheduled Meds:   ciprofloxacin  400 mg IntraVENous Q12H    insulin lispro  0-6 Units SubCUTAneous Q6H    metoprolol tartrate  50 mg Oral BID    [Held by provider] furosemide  40 mg IntraVENous Daily    sodium chloride flush  5-40 mL IntraVENous 2 times per day    famotidine (PEPCID) injection  20 mg IntraVENous BID    losartan  25 mg Oral Daily    enoxaparin  1 mg/kg SubCUTAneous Q12H    busPIRone  10 mg Oral TID    Vitamin D  2,000 Units Oral Daily    zinc sulfate  50 mg Oral Daily     Continuous Infusions:   ketamine (KETALAR)10 mg/mL infusion for analgosedation 1.001 mg/kg/hr (12/24/21 0733)    dexmedetomidine 0.9 mcg/kg/hr (12/24/21 0733)    dextrose Stopped (12/16/21 0400)    sodium chloride 75 mL/hr at 12/24/21 0714    fentaNYL (SUBLIMAZE) 1250 mcg in sodium chloride 0.9 % 250 mL 200 mcg/hr (12/24/21 0714)    DOPamine Stopped (12/18/21 1746)    midazolam 10 mg/hr (12/24/21 0714)    norepinephrine Stopped (12/15/21 1542)    sodium chloride         PHYSICAL EXAMINATION:  T: 99.6. B/P: 114/83. P: 157. RR: 21. O2 Sat:  94.  I/O:  1.5/2.9  Body mass index is 30.83 kg/m².     GCS: 3  Mode: AC/PC set RR: 1820 actual RR: 14: Pcontrol: 12 PEEP: 14. FiO2: 100  Pain/Sedation/paralytic:  Fentanyl, ketamine, Versed, Precedex, Nimbex  CV Drips: Amiodarone, Yahir-Synephrine  General:   Chronically ill male, intubated sedated mechanical ventilation  HEENT:  normocephalic and atraumatic. No scleral icterus. PERR  Neck: supple. No Thyromegaly. Lungs: Decreased breath sounds throughout lung fields, clear to auscultation. No retractions  Cardiac: Irregularly irregular rhythm with tachycardic rate. No JVD. Abdomen: soft. Bowel sounds present  Extremities:  No clubbing, no cyanosis. +2 bilateral lower extremity with  Vasculature: capillary refill < 3 seconds. Palpable dorsalis pedis pulses. Skin:  warm and dry. Psych:  Unable to assess due to patient being intubated, sedated and on mechanical ventilation. Lymph:  No supraclavicular adenopathy. Neurologic:  No focal deficit. No seizures. Data: (All radiographs, tracings, PFTs, and imaging are personally viewed and interpreted unless otherwise noted). Sodium 136, potassium 4.0, chloride 100, CO2 28, BUN 31, creatinine 0.6, magnesium 1.6, calcium 8.0  Albumin 2.2, Alk phos 203, , , total bilirubin 1.4, total protein 5.7  WBC 12.3, hemoglobin 11.0, hematocrit 33.5, platelets 668  AB.09/95/16/82  Telemetry shows atrial fibrillation  CXR : Worsening diffuse patchy lung opacities. EKG demonstrates: Atrial fibrillation with rapid ventricular response. ST depression, consider subendocardial injury, Nonspecific T wave abnormality. Abnormal ECG. When compared with ECG of 15-DEC-2021 02:37,Atrial fibrillation has replaced Sinus rhythm. Ventricular rate has increased  BPM. ST now depressed in Lateral leads.  Nonspecific T wave abnormality now evident in Inferior leads    Seen with multidisciplinary ICU team.  Meets Continued ICU Level Care Criteria:    [x] Yes   [] No - Transfer Planned to listed location:  [] HOSPITALIST CONTACTED-      Case and plan discussed with Dr. Hortensia Martinez. Electronically signed by Kesha Grimm DO  CRITICAL CARE SPECIALIST  Patient seen by me. Case discussed with resident physician. Ventilator dependent. Required electric cardioversion and amiodarone for a fib with RVR. Now in sinus. Italicized font represents changes to the note made by me. CC time 35 minutes. Time was discontiguous. Time does not include procedures. Time does include my direct assessment of the patient and coordination of care.   Electronically signed by Charmaine Guevraa MD on 12/24/2021 at 5:29 PM

## 2021-12-25 NOTE — PROCEDURES
PRE-OP DIAGNOSIS: Atrial fibrillation with rapid ventricular  response    POST-OP DIAGNOSIS: Sinus tachycardia. :  Dr. Camelia Michael:   Intubated on fentanyl, ketamine, Versed, Precedex as well as Nimbex    COMPLICATIONS:  None. OPERATIVE TERM:  DC cardioversion. Due to the emergent nature of patient's condition consent was not obtained as for was emergent. OPERATIVE TECHNIQUE:  The patient was sedated with fentanyl, ketamine, Versed, Precedex as well as Nimbex. DC cardioversion was performed x2 with 360 J biphasically and synchronously. First synchronized cardioversion was unsuccessful, second cardioversion successful. IMPRESSION: Successful DC cardioversion.     1100 AM        Electronically signed by Nura Mendez DO on 12/24/2021 at 7:46 PM

## 2021-12-25 NOTE — PROGRESS NOTES
CRITICAL CARE PROGRESS NOTE      Patient:  Karthik [place]    Unit/Bed:4B-11/011-A  YOB: 1955  MRN: [id number]   PCP: Mitzi Hutchison  Date of Admission: 12/5/2021  Chief Complaint:- Shortness of breath      Assessment and Plan:       Acute hypoxic respiratory failure: This is secondary to COVID-19 pneumonia, pulmonary embolism, and superimposed bacterial pneumonia. Currently FiO2 60%, Peep 8 and Pcontrol 14. Patient required tube extchange last night for cuff leak, tolerating new tube well 12/21/21. Patient is very edematous with net + 20 L patient received Lasix  (12/22/21)               - Continue lung protective strategies. - Wean as tolerated. CHELSEA Stage 1: Cr 1.5. BUN: Cr ratio> 20. Likely pre-renal due to fluid overload. \ Will obtain urine lytes. Stop fluid due to edematous. Will give one time dose of Lasix 40 mg IV. New onset Afib: Patient found to be in atrial fibrillation with rapid ventricular response early this morning, 12/24/21. Patient had significant hemodynamic compromise with blood pressure 75/48 and that persistently less than 60, respiratory status is also significantly decompensated. Patient underwent synchronized cardioversion at 360 J however was unsuccessful. Patient was subsequently given amiodarone bolus followed by drip. Throughout the morning and early afternoon patient continued to be in atrial fibrillation with elevated heart rate at 155. At that time patient was synchronized cardioverted x2 at 360 J. Successfully. Patient now in normal sinus rhythm with a heart rate of 108. Anticoagulated with Lovenox   -Continue amiodarone drip    Hypermetabolic Syndrome: [id number] infectinon increase his basal metabolic rate by x3. .  This results in increased need for medication most notable increase in sedation.   This is demonstrated by patient's continuous increase dosage as well as additional sedation.   - Will initiate cooling patient with a target male with only significant past medical history of hypertension who presented to [facility] ED on December 5 for complaints of progressive shortness of breath. Patient is unvaccinated against COVID-19. He was discharged from the [facility] emergency room on November 26 after being diagnosed with Covid. He was given Decadron at that time and discharged home on home oxygen. Patient continued to worsen which is when he came to the emergency room on December 5. He was found to have Covid pneumonia with acute respiratory failure with initiate high flow nasal cannula at 100%. Patient's pulse ox improved and he was started on Decadron as well baricitinib. Over the course of his hospital stay, patient's respiratory function worsened and he was subsequently transitioned to BiPAP. He eventually failed BiPAP. He also developed a saddle pulmonary embolism and was started on therapeutic Lovenox in the baricitinib was discontinued. On the day of transfer to the ICU patient became profoundly hypoxic on BiPAP and was in respiratory distress. He was transferred to the ICU for further management. He was intubated on December 14, 2021.  12/16/2021: No events overnight. Unable to wean on vent at this time. Not requiring any pressors at this time. Creatinine bumped up today. Will get urine studies. 12/17/2021: No events overnight. Remaining on stable ventilator settings. Creatinine improved with fluids. 12/20/21: Continues with high ventilatory settings FiO2 70% PEEP of 12. Patient was discontinued on baricitinib due to PE. Patient continued on therapeutic Lovenox. 12/21/21:  Currently FiO2 80%, Peep 12 and Pcontrol 14. Patient required tube extchange last night for cuff leak, tolerating new tube well. Respiratory cultures growing enteric gram negative bacilli. Pneumonia molecular panel positive for e. Coli and Klebsiella aerogenes. Patient was started on Cipro 12/21/21 (day 1).   Patient does remain without leukocytosis and mild temperature elevation without true fever. 12/22/21: NPO due to vomiting and high residuals. Urinating adequately however significantly edematous will give albumin and lasix. Vent support decreased FiO2 60%, Peep 8 and Pcontrol 14. Day 2 cipro for e. Coli and Klebsiella aerogenes pneumonia. Afebrile without leukocytosis. 12/23/21: New leukocytosis, without fever, day 3 ciprofloxacin. Respiratory culture growing E. Coli. Pneumonia molecular panel growing E. coli as well as Klebsiella aeruginosa. Patient continues to require significant amount of ventilatory support FiO2 70%, PEEP 8, pressure control 14. , DC propofol and start ketamine drip. Family requesting ABG        12/24/21:  Called to bedside at 0730, immediately at bedside patient spO2 found to be 84% and patient was also tachycardic. Physical exam demonstrated ventilator dyssynchrony. At that time sedation was increased to maximal capacity. Patient then went into atrial fibrillation with a ventricular response, followed by significant unstable hemodynamics with BP 75/48 and persistent MAP less than 60. His respiratory status continue to compensate as well. Attempted synchronized cardioversion at that time with 360 J however was unsuccessful. Patient started on Nimbex bolus for ventilator synchrony as well as given amiodarone bolus followed by drip. Patient continued to be in atrial fibrillation with RVR throughout the morning. Reattempted synchronized cardioversion at 130 2nd administration of 360 was successful. Patient currently in normal sinus rhythm with a heart rate of 106. Increased ventilatory requirements throughout the night and during the day currently on AC/PC mode Pcontrol: 12 PEEP: 14. FiO2: 100%. Patient also has had significant increase in sedation requirements, this is highly indicative of hyper metabolic syndrome secondary to COVID-19. Will initiate cooling patient at this time.   Patient fentaNYL (SUBLIMAZE) 1250 mcg in sodium chloride 0.9 % 250 mL 200 mcg/hr (21 1443)    DOPamine Stopped (21 1746)    midazolam 10 mg/hr (21 1443)    norepinephrine Stopped (12/15/21 1542)    sodium chloride         PHYSICAL EXAMINATION:  T: 96.8. B/P: 96/55. P: 77   RR: 20. O2 Sat:  94.  I/O:  1.5/2.9  Body mass index is 30.83 kg/m². GCS:   3  Mode: AC/PC set RR: 1820 actual RR: 14: Pcontrol: 12 PEEP: 14. FiO2: 100  Pain/Sedation/paralytic:  Fentanyl, ketamine, Versed, Precedex, Nimbex  CV Drips: Amiodarone, Yahir-Synephrine  General:   Chronically ill male, intubated sedated mechanical ventilation  HEENT:  normocephalic and atraumatic. No scleral icterus. PERR  Neck: supple. No Thyromegaly. Lungs: Decreased breath sounds throughout lung fields, clear to auscultation. No retractions  Cardiac: Irregularly irregular rhythm with tachycardic rate. No JVD. Abdomen: soft. Bowel sounds present  Extremities:  No clubbing, no cyanosis. +2 bilateral lower extremity with  Vasculature: capillary refill < 3 seconds. Palpable dorsalis pedis pulses. Skin:  warm and dry. Psych:  Unable to assess due to patient being intubated, sedated and on mechanical ventilation. Lymph:  No supraclavicular adenopathy. Neurologic:  No focal deficit. No seizures. Data: (All radiographs, tracings, PFTs, and imaging are personally viewed and interpreted unless otherwise noted).  Sodium 132, potassium 4.8, chloride 97, CO2 28, BUN 38, creatinine 1.5, GFR 47   Albumin 1.9, alk phos 191, ALT 88, AST 74, bilirubin 1.4   WBC 22.9, hemoglobin 10.2, MCV 98.2, platelet count 425 AB.39/49/52/30   Telemetry shows atrial fibrillation   CXR : Worsening diffuse patchy lung opacities.  EKG demonstrates: Atrial fibrillation with rapid ventricular response. ST depression, consider subendocardial injury, Nonspecific T wave abnormality. Abnormal ECG.  When compared with ECG of 15-DEC-2021 02:37,Atrial fibrillation has replaced Sinus rhythm. Ventricular rate has increased  BPM. ST now depressed in Lateral leads. Nonspecific T wave abnormality now evident in Inferior leads    Seen with multidisciplinary ICU team.  Meets Continued ICU Level Care Criteria:    [x] Yes   [] No - Transfer Planned to listed location:  [] HOSPITALIST CONTACTED-      Case and plan discussed with Dr. Amarjit Quinones.         Electronically signed by Liza Connolly DO  CRITICAL CARE SPECIALIST

## 2021-12-25 NOTE — PROCEDURES
PRE-OP DIAGNOSIS: Atrial fibrillation rapid ventricle response    POST-OP DIAGNOSIS: Atrial fibrillation with rapid ventricular response    :  Dr. Ankur Dowling: Intubated on fentanyl, ketamine, Versed, Precedex as well as Nimbex    COMPLICATIONS:  None. OPERATIVE TERM:  DC cardioversion. Due to the emergent nature of patient's condition consent was not obtained as for was emergent. OPERATIVE TECHNIQUE:  The patient was already sedated with fentanyl, ketamine, Versed, Precedex as well as Nimbex. When the patient was no longer responsive to quiet voice, DC cardioversion was performed with 360 J biphasically and synchronously. IMPRESSION: Unsuccessful DC cardioversion.    5132 am     Electronically signed by Jessenia Rivera DO on 12/24/2021 at 7:44 PM

## 2021-12-26 NOTE — PROGRESS NOTES
Call placed to patient's wife, Noah. Updated on POC and patient condition. Mikayla verbalized understanding.

## 2021-12-26 NOTE — PROGRESS NOTES
CRITICAL CARE PROGRESS NOTE      Patient:  Brie Celis    Unit/Bed:4B-11/011-A  YOB: 1955  MRN: [id number]   PCP: Wu Barnes  Date of Admission: 12/5/2021  Chief Complaint:- Shortness of breath      Assessment and Plan:       Acute hypoxic respiratory failure: This is secondary to COVID-19 pneumonia, pulmonary embolism, and superimposed bacterial pneumonia. Currently FiO2 60%, Peep 8 and Pcontrol 14. Patient required tube extchange last night for cuff leak, tolerating new tube well 12/21/21. Patient is very edematous with net + 20 L patient received Lasix  (12/22/21)               - Continue lung protective strategies. - Wean as tolerated. 12/26/21: Patient CXR shows normal chest.  Will wean FiO2 and PEEP. Continue lung protective strategies. CHELSEA Stage 1: Cr 1.5. BUN: Cr ratio> 20. Likely pre-renal due to fluid overload. \ Will obtain urine lytes. Stop fluid due to edematous. Will give one time dose of Lasix 40 mg IV.      12/26/21: Worsening. Cr 1.8 today. Urine output decent. BUN/creatinine ratio greater than 20 likely due to fluid overload. Will trial one-time dose of Lasix 40 g IV. Will repeat check BMP at 2 PM.  If creatinine worsens will get nephrology on board. New onset Afib: Patient found to be in atrial fibrillation with rapid ventricular response early this morning, 12/24/21. Patient had significant hemodynamic compromise with blood pressure 75/48 and that persistently less than 60, respiratory status is also significantly decompensated. Patient underwent synchronized cardioversion at 360 J however was unsuccessful. Patient was subsequently given amiodarone bolus followed by drip. Throughout the morning and early afternoon patient continued to be in atrial fibrillation with elevated heart rate at 155. At that time patient was synchronized cardioverted x2 at 360 J. Successfully. Patient now in normal sinus rhythm with a heart rate of 108. Anticoagulated with Lovenox   -Continue amiodarone drip    12/26: Patient was in normal sinus rhythm after cardioversion however flipped into atrial fibrillation today. Anticoagulated with Lovenox. Currently on amiodarone drip. Rate is so far controlled. Hypermetabolic Syndrome: COCIX-50 infectinon increase his basal metabolic rate by x3. .  This results in increased need for medication most notable increase in sedation. This is demonstrated by patient's continuous increase dosage as well as additional sedation.   - Will initiate cooling patient with a target temperature of 35 to 60 °C-for 72 hours. - Patient is currently on Nimbex      COVID-19 Pneumonia: Patient was positive on 11/26/2021. He failed treatment was admitted on 12/5/2021. Patient ultimately progressed to BiPAP and failed. Was intubated 12/14/2021. He remains on Decadron. Superimposed Bacterial Pneumonia: Respiratory cultures growing enteric gram negative bacilli. Pneumonia molecular panel positive for e. Coli and Klebsiella aerogenes. Patient was started on Cipro 12/21/21 (day 5). Will re culture and repeat CXR due to worsening leukocytosis     12/26: Day 6 of ciprofloxacin. Leukocytosis improving. No fevers over last 24 hours. Respiratory culture that had moderate growth E. coli shows sensitivity to Cipro. Abnormal LFTs: New onset. Calculated R factor = 1.1, this is less than 2 demonstrating cholestatic pattern of liver injury. Likely congestive hepatomegaly. Severe ARDS: Proning therapy initiated     Pulmonary embolism (Saddle, no RV strain): Patient was diagnosed on 12/8/2021. He was started on therapeutic Lovenox. Interventional radiology does not feel that thrombectomy is indicated at this time. Baricitinib was discontinued. Hypoalbuminemia: Improving. Current Albumin 2.2 . Liekly due to increased vascular permeability from effects of cytokines TNF alpha, IL6 and chemokines, which are well described as a part of covid infection, however it is also exacerbated by endotoxins from gram his gram negative bacterial infections, e. Coli and klebsiella. Transfused 25g albumin x1 (12/22)    -Monitor with daily CMP     Hypertriglyceridemia: Secondary to propofol use. Will discontinue Propofol at this time and start ketamine drip. History of hypertension: Continue home Lopressor (with holding parameters)     Bradycardia: Resolved. Was started on dopamine drip and this is improved. Dopamine drip discontinued. Pt able to maintain heart rate. Likely due to precidex. INITIAL H AND P AND ICU COURSE:  Patient is a 59-year-old white male with only significant past medical history of hypertension who presented to [facility] ED on December 5 for complaints of progressive shortness of breath. Patient is unvaccinated against COVID-19. He was discharged from the [facility] emergency room on November 26 after being diagnosed with Covid. He was given Decadron at that time and discharged home on home oxygen. Patient continued to worsen which is when he came to the emergency room on December 5. He was found to have Covid pneumonia with acute respiratory failure with initiate high flow nasal cannula at 100%. Patient's pulse ox improved and he was started on Decadron as well baricitinib. Over the course of his hospital stay, patient's respiratory function worsened and he was subsequently transitioned to BiPAP. He eventually failed BiPAP. He also developed a saddle pulmonary embolism and was started on therapeutic Lovenox in the baricitinib was discontinued. On the day of transfer to the ICU patient became profoundly hypoxic on BiPAP and was in respiratory distress. He was transferred to the ICU for further management. He was intubated on December 14, 2021.  12/16/2021: No events overnight. Unable to wean on vent at this time. Not requiring any pressors at this time. Creatinine bumped up today.   Will get urine studies. 12/17/2021: No events overnight. Remaining on stable ventilator settings. Creatinine improved with fluids. 12/20/21: Continues with high ventilatory settings FiO2 70% PEEP of 12. Patient was discontinued on baricitinib due to PE. Patient continued on therapeutic Lovenox. 12/21/21:  Currently FiO2 80%, Peep 12 and Pcontrol 14. Patient required tube extchange last night for cuff leak, tolerating new tube well. Respiratory cultures growing enteric gram negative bacilli. Pneumonia molecular panel positive for e. Coli and Klebsiella aerogenes. Patient was started on Cipro 12/21/21 (day 1). Patient does remain without leukocytosis and mild temperature elevation without true fever. 12/22/21: NPO due to vomiting and high residuals. Urinating adequately however significantly edematous will give albumin and lasix. Vent support decreased FiO2 60%, Peep 8 and Pcontrol 14. Day 2 cipro for e. Coli and Klebsiella aerogenes pneumonia. Afebrile without leukocytosis. 12/23/21: New leukocytosis, without fever, day 3 ciprofloxacin. Respiratory culture growing E. Coli. Pneumonia molecular panel growing E. coli as well as Klebsiella aeruginosa. Patient continues to require significant amount of ventilatory support FiO2 70%, PEEP 8, pressure control 14. , DC propofol and start ketamine drip. Family requesting ABG        12/24/21:  Called to bedside at 0730, immediately at bedside patient spO2 found to be 84% and patient was also tachycardic. Physical exam demonstrated ventilator dyssynchrony. At that time sedation was increased to maximal capacity. Patient then went into atrial fibrillation with a ventricular response, followed by significant unstable hemodynamics with BP 75/48 and persistent MAP less than 60. His respiratory status continue to compensate as well. Attempted synchronized cardioversion at that time with 360 J however was unsuccessful.   Patient started on Nimbex bolus for ventilator synchrony as well as given amiodarone bolus followed by drip. Patient continued to be in atrial fibrillation with RVR throughout the morning. Reattempted synchronized cardioversion at 130 2nd administration of 360 was successful. Patient currently in normal sinus rhythm with a heart rate of 106. Increased ventilatory requirements throughout the night and during the day currently on AC/PC mode Pcontrol: 12 PEEP: 14. FiO2: 100%. Patient also has had significant increase in sedation requirements, this is highly indicative of hyper metabolic syndrome secondary to COVID-19. Will initiate cooling patient at this time. Patient also has abnormal LFTs. R factor 1.1 demonstrating likely cholestatic injury. Abdominal ultrasound ordered. T-max 99.6. Patient currently on day 4 of Cipro with WBC 12.34 E. coli pneumonia. 12/25/21: Patient on max sedation. Currently paralyzed for ventilator dyssynchrony. Currently in sinus rhythm after synchronized cardioversion done on 12/24. Worsening leukocytosis however could be reactive due to cardioversion. Will monitor and culture. No fever suspected. Continue to cooling patient. Worsening kidney function. Patient edematous will give one time dose of Lasix 40 mg IV due to patient being +20L and suspected cardiorenal.      12/26: Patient continues to require multiple sedations with fentanyl, Versed, and ketamine. Currently paralyzed with Nimbex. Patient did flip back into atrial fibrillation today. Continue amiodarone drip. Day 6 of ciprofloxacin for E. coli      Past Medical History:  Per HPI   Social History: lifetime non smoker, no etoh, no evidence of illicit drug use       ROS   Unable to assess due to patient being intubated, sedated and on mechanical ventilation.        Scheduled Meds:   polyethylene glycol  17 g Oral Daily    bisacodyl  10 mg Rectal Daily    docusate sodium  100 mg Per NG tube BID    senna  5 mL Per NG tube BID    ciprofloxacin  400 mg IntraVENous Q12H    insulin lispro  0-6 Units SubCUTAneous Q6H    metoprolol tartrate  50 mg Oral BID    furosemide  40 mg IntraVENous Daily    sodium chloride flush  5-40 mL IntraVENous 2 times per day    famotidine (PEPCID) injection  20 mg IntraVENous BID    losartan  25 mg Oral Daily    enoxaparin  1 mg/kg SubCUTAneous Q12H    busPIRone  10 mg Oral TID    Vitamin D  2,000 Units Oral Daily    zinc sulfate  50 mg Oral Daily     Continuous Infusions:   amiodarone 0.5 mg/min (12/26/21 0608)    cisatracurium (NIMBEX) infusion 3 mcg/kg/min (12/26/21 0256)    phenylephrine (KIRBY-SYNEPHRINE) 50mg/250mL infusion Stopped (12/25/21 2029)    ketamine (KETALAR)10 mg/mL infusion for analgosedation 0.751 mg/kg/hr (12/26/21 4177)    dexmedetomidine Stopped (12/24/21 1223)    dextrose Stopped (12/16/21 0400)    [Held by provider] sodium chloride Stopped (12/25/21 1341)    fentaNYL (SUBLIMAZE) 1250 mcg in sodium chloride 0.9 % 250 mL 175 mcg/hr (12/26/21 0734)    DOPamine Stopped (12/18/21 1746)    midazolam 10 mg/hr (12/26/21 0608)    norepinephrine Stopped (12/15/21 1542)    sodium chloride         PHYSICAL EXAMINATION:  T: 97.6. B/P: 116/56. P 90 RR: 20. O2 Sat:  94.  I/O:  1.5/2.9  Body mass index is 32.08 kg/m². GCS:   3  Mode: AC/PC set RR: 1820 actual RR: 14: Pcontrol: 12 PEEP: 14. FiO2: 100  Pain/Sedation/paralytic:  Fentanyl, ketamine, Versed, Precedex, Nimbex  CV Drips: Amiodarone, Kirby-Synephrine  General:   Chronically ill male, intubated sedated mechanical ventilation  HEENT:  normocephalic and atraumatic. No scleral icterus. PERR  Neck: supple. No Thyromegaly. Lungs: Decreased breath sounds throughout lung fields, clear to auscultation. No retractions  Cardiac: Irregularly irregular rhythm with tachycardic rate. No JVD. Abdomen: soft. Bowel sounds present  Extremities:  No clubbing, no cyanosis. +2 bilateral lower extremity with  Vasculature: capillary refill < 3 seconds.  Palpable dorsalis pedis pulses. Skin:  warm and dry. Psych:  Unable to assess due to patient being intubated, sedated and on mechanical ventilation. Lymph:  No supraclavicular adenopathy. Neurologic:  No focal deficit. No seizures. Data: (All radiographs, tracings, PFTs, and imaging are personally viewed and interpreted unless otherwise noted).  Sodium 132, potassium 4.7, chloride 96, CO2 27, BUN 46, creatinine 1.8, anion gap 9, magnesium 1.8   Albumin 1.8, alk phos 219, ALT 86, AST 66   WBC 13.3, hemoglobin 9.3, platelet count 499   Telemetry shows atrial fibrillation   CXR : Worsening diffuse patchy lung opacities.  EKG demonstrates: Atrial fibrillation with rapid ventricular response. ST depression, consider subendocardial injury, Nonspecific T wave abnormality. Abnormal ECG. When compared with ECG of 15-DEC-2021 02:37,Atrial fibrillation has replaced Sinus rhythm. Ventricular rate has increased  BPM. ST now depressed in Lateral leads. Nonspecific T wave abnormality now evident in Inferior leads   Chest x-ray done on 12/26 shows stable chest    Seen with multidisciplinary ICU team.  Meets Continued ICU Level Care Criteria:    [x] Yes   [] No - Transfer Planned to listed location:  [] HOSPITALIST CONTACTED-      Case and plan discussed with Dr. Lavonne Nageotte.         Electronically signed by Adrienne Light DO  CRITICAL CARE SPECIALIST

## 2021-12-27 NOTE — PROGRESS NOTES
Yamilet Torres 60  PHYSICAL THERAPY MISSED TREATMENT NOTE  [facility]B    Date: 2021  Patient Name: Kev Lau        MRN: [id number]   : 1955  (77 y.o.)  Gender: male   Referring Practitioner: Fritz Corcoran DO  Diagnosis: COVID-19         REASON FOR MISSED TREATMENT:  Hold treatment per nursing request. Per conversation with OT- Pt remains intubated with heavy levels of sedation, and is not appropriate for early mobility. Will again discontinue orders at this time, please reorder when physical therapy appropriate.      Denia Severino PT, DPT

## 2021-12-27 NOTE — PROGRESS NOTES
CRITICAL CARE PROGRESS NOTE      Patient:  Allyson Luzerne    Unit/Bed:4B-11/011-A  YOB: 1955  MRN: [id number]   PCP: Hermila Wiley  Date of Admission: 12/5/2021  Chief Complaint:- Shortness of breath      Assessment and Plan:       Acute hypoxic respiratory failure: This is secondary to COVID-19 pneumonia, pulmonary embolism, and superimposed bacterial pneumonia. Currently FiO2 60%, Peep 8 and Pcontrol 14. Patient required tube extchange last night for cuff leak, tolerating new tube well 12/21/21. Patient is very edematous with net + 20 L patient received Lasix  (12/22/21)               - Continue lung protective strategies. - Wean as tolerated. 12/26/21: Patient CXR shows normal chest.  Will wean FiO2 and PEEP. Continue lung protective strategies. 12/27/21: Nimbex weaned. Continues to be on multiple sedation. Continues to be stable on FiO2 of 70%. CHELSEA Stage 1: Cr 1.5. BUN: Cr ratio> 20. Likely pre-renal due to fluid overload. \ Will obtain urine lytes. Stop fluid due to edematous. Will give one time dose of Lasix 40 mg IV.      12/26/21: Worsening. Cr 1.8 today. Urine output decent. BUN/creatinine ratio greater than 20 likely due to fluid overload. Will trial one-time dose of Lasix 40 g IV. Will repeat check BMP at 2 PM.  If creatinine worsens will get nephrology on board. 12/27: Creatinine worsened nephrology consulted. No need for acute dialysis today according to nephrology. May require renal replacement therapy that you future. New onset Afib: Patient found to be in atrial fibrillation with rapid ventricular response early this morning, 12/24/21. Patient had significant hemodynamic compromise with blood pressure 75/48 and that persistently less than 60, respiratory status is also significantly decompensated. Patient underwent synchronized cardioversion at 360 J however was unsuccessful. Patient was subsequently given amiodarone bolus followed by drip. Throughout the morning and early afternoon patient continued to be in atrial fibrillation with elevated heart rate at 155. At that time patient was synchronized cardioverted x2 at 360 J. Successfully. Patient now in normal sinus rhythm with a heart rate of 108. Anticoagulated with Lovenox   -Continue amiodarone drip    12/26: Patient was in normal sinus rhythm after cardioversion however flipped into atrial fibrillation today. Anticoagulated with Lovenox. Currently on amiodarone drip. Rate is so far controlled. 12/27: Currently in normal sinus rhythm. Continues to be on amiodarone drip. Rate controlled. Hypermetabolic Syndrome: EIQIK-80 infectinon increase his basal metabolic rate by x3. .  This results in increased need for medication most notable increase in sedation. This is demonstrated by patient's continuous increase dosage as well as additional sedation.   - Will initiate cooling patient with a target temperature of 35 to 60 °C-for 72 hours. - Patient is currently on Nimbex   12/27: Target temperature done. COVID-19 Pneumonia: Patient was positive on 11/26/2021. He failed treatment was admitted on 12/5/2021. Patient ultimately progressed to BiPAP and failed. Was intubated 12/14/2021. He remains on Decadron. Superimposed Bacterial Pneumonia: Respiratory cultures growing enteric gram negative bacilli. Pneumonia molecular panel positive for e. Coli and Klebsiella aerogenes. Patient was started on Cipro 12/21/21 (day 5). Will re culture and repeat CXR due to worsening leukocytosis     12/26: Day 6 of ciprofloxacin. Leukocytosis improving. No fevers over last 24 hours. Respiratory culture that had moderate growth E. coli shows sensitivity to Cipro. 12/27: Continues to have worsening leukocytosis. Pro-Fernie elevated at 0.97. Remains afebrile. We will escalate antibiotics to cefepime and discontinue Cipro    Abnormal LFTs: New onset.  Calculated R factor = 1.1, this is less than 2 demonstrating cholestatic pattern of liver injury. Likely congestive hepatomegaly. Severe ARDS: Proning therapy initiated     Pulmonary embolism (Saddle, no RV strain): Patient was diagnosed on 12/8/2021. He was started on therapeutic Lovenox. Interventional radiology does not feel that thrombectomy is indicated at this time. Baricitinib was discontinued. Hypoalbuminemia: Improving. Current Albumin 2.2 . Liekly due to increased vascular permeability from effects of cytokines TNF alpha, IL6 and chemokines, which are well described as a part of covid infection, however it is also exacerbated by endotoxins from gram his gram negative bacterial infections, e. Coli and klebsiella. Transfused 25g albumin x1 (12/22)    -Monitor with daily CMP     Hypertriglyceridemia: Secondary to propofol use. Will discontinue Propofol at this time and start ketamine drip. History of hypertension: Continue home Lopressor (with holding parameters)     Bradycardia: Resolved. Was started on dopamine drip and this is improved. Dopamine drip discontinued. Pt able to maintain heart rate. Likely due to precidex. INITIAL H AND P AND ICU COURSE:  Patient is a 51-year-old white male with only significant past medical history of hypertension who presented to [facility] ED on December 5 for complaints of progressive shortness of breath. Patient is unvaccinated against COVID-19. He was discharged from the [facility] emergency room on November 26 after being diagnosed with Covid. He was given Decadron at that time and discharged home on home oxygen. Patient continued to worsen which is when he came to the emergency room on December 5. He was found to have Covid pneumonia with acute respiratory failure with initiate high flow nasal cannula at 100%. Patient's pulse ox improved and he was started on Decadron as well baricitinib.   Over the course of his hospital stay, patient's respiratory function worsened and immediately at bedside patient spO2 found to be 84% and patient was also tachycardic. Physical exam demonstrated ventilator dyssynchrony. At that time sedation was increased to maximal capacity. Patient then went into atrial fibrillation with a ventricular response, followed by significant unstable hemodynamics with BP 75/48 and persistent MAP less than 60. His respiratory status continue to compensate as well. Attempted synchronized cardioversion at that time with 360 J however was unsuccessful. Patient started on Nimbex bolus for ventilator synchrony as well as given amiodarone bolus followed by drip. Patient continued to be in atrial fibrillation with RVR throughout the morning. Reattempted synchronized cardioversion at 130 2nd administration of 360 was successful. Patient currently in normal sinus rhythm with a heart rate of 106. Increased ventilatory requirements throughout the night and during the day currently on AC/PC mode Pcontrol: 12 PEEP: 14. FiO2: 100%. Patient also has had significant increase in sedation requirements, this is highly indicative of hyper metabolic syndrome secondary to COVID-19. Will initiate cooling patient at this time. Patient also has abnormal LFTs. R factor 1.1 demonstrating likely cholestatic injury. Abdominal ultrasound ordered. T-max 99.6. Patient currently on day 4 of Cipro with WBC 12.34 E. coli pneumonia. 12/25/21: Patient on max sedation. Currently paralyzed for ventilator dyssynchrony. Currently in sinus rhythm after synchronized cardioversion done on 12/24. Worsening leukocytosis however could be reactive due to cardioversion. Will monitor and culture. No fever suspected. Continue to cooling patient. Worsening kidney function. Patient edematous will give one time dose of Lasix 40 mg IV due to patient being +20L and suspected cardiorenal.      12/26: Patient continues to require multiple sedations with fentanyl, Versed, and ketamine.   Currently paralyzed with Nimbex. Patient did flip back into atrial fibrillation today. Continue amiodarone drip. Day 6 of ciprofloxacin for E. Coli    12/27: Nimbex weaned. Patient is back in normal sinus rhythm. Amiodarone drip discontinued. Currently rate controlled. Due to worsening leukocytosis will escalate antibiotic therapy to cefepime and discontinue Cipro for E. coli. Nephrology will await for renal replacement therapy and may initiate tomorrow. Past Medical History:  Per HPI   Social History: lifetime non smoker, no etoh, no evidence of illicit drug use       ROS   Unable to assess due to patient being intubated, sedated and on mechanical ventilation.        Scheduled Meds:   ciprofloxacin  400 mg IntraVENous Q12H    polyethylene glycol  17 g Oral Daily    bisacodyl  10 mg Rectal Daily    docusate sodium  100 mg Per NG tube BID    senna  5 mL Per NG tube BID    insulin lispro  0-6 Units SubCUTAneous Q6H    metoprolol tartrate  50 mg Oral BID    [Held by provider] furosemide  40 mg IntraVENous Daily    sodium chloride flush  5-40 mL IntraVENous 2 times per day    famotidine (PEPCID) injection  20 mg IntraVENous BID    [Held by provider] losartan  25 mg Oral Daily    enoxaparin  1 mg/kg SubCUTAneous Q12H    busPIRone  10 mg Oral TID    Vitamin D  2,000 Units Oral Daily    zinc sulfate  50 mg Oral Daily     Continuous Infusions:   phenylephrine (KIRBY-SYNEPHRINE) 250 mg/250 mL infusion 175 mcg/min (12/27/21 0831)    fentaNYL (SUBLIMAZE) 1250 mcg in sodium chloride 0.9 % 250 mL      amiodarone 0.5 mg/min (12/27/21 0503)    cisatracurium (NIMBEX) infusion 1 mcg/kg/min (12/27/21 0503)    ketamine (KETALAR)10 mg/mL infusion for analgosedation 0.501 mg/kg/hr (12/27/21 0503)    dextrose Stopped (12/16/21 0400)    [Held by provider] sodium chloride Stopped (12/25/21 1341)    midazolam 10 mg/hr (12/27/21 0503)    sodium chloride         PHYSICAL EXAMINATION:  T: 98.7 B/P: 119/61 P 90 RR: 20 O2 Planned to listed location:  [] HOSPITALIST CONTACTED-      Case and plan discussed with Dr. Val Erazo.         Electronically signed by Zackary Borja DO  CRITICAL CARE SPECIALIST

## 2021-12-27 NOTE — CONSULTS
[facility]          [facility]        [address]        [place] [facility]        [phone]           Inpatient Initial consult note         12/27/2021 7:42 AM    Patient Name:   Raheel Wilson  YOB: 1955  Primary Care Physician:  Pankaj Meza     History Obtained From:  electronic medical record     Consultation requested by : Alie Richmond MD    requested for  : Evaluation of  worsening renal function     History of presentingillness   Raheel Wilson is a 77 y.o.   male with Past Medical History as stated below presented with a chief complaint of Positive For Covid-19 and Shortness of Breath   on 12/5/2021 . Patient intubated and accurate history and review of systems cannot be obtained    Patient was diagnosed with COVID-19 on November 26. He was unvaccinated he was initially discharged home on oxygen and Decadron however patient's symptoms continued to progress and became more short of breath so he presented to the ED on December 5 he was diagnosed with COVID-19 pneumonia with acute respiratory failure initially was on high flow nasal cannula but the saturations have worsened. And so he has to be intubated and transferred to the ICU on 12/14/2021. During the hospitalization patient's creatinine has gradually started to worsen, it is up to 2.1 and the patient was also diagnosed with pulmonary embolism and superimposed bacterial pneumonia and atrial fibrillation for which he has to be cardioverted and on amnio drip. Patient is also requiring pressors. Is currently on 70% FiO2     Past History      Past Medical History:   Diagnosis Date    COVID-19 12/02/2021    Hypertension      History reviewed. No pertinent surgical history.   Social History     Socioeconomic History    Marital status:      Spouse name: Not on file    Number of children: Not on file    Years of education: Not on file    Highest education level: Not on file Occupational History    Not on file   Tobacco Use    Smoking status: Never Smoker    Smokeless tobacco: Never Used   Substance and Sexual Activity    Alcohol use: Not Currently    Drug use: Never    Sexual activity: Not on file   Other Topics Concern    Not on file   Social History Narrative    Not on file     Social Determinants of Health     Financial Resource Strain:     Difficulty of Paying Living Expenses: Not on file   Food Insecurity:     Worried About Running Out of Food in the Last Year: Not on file    [place] of Food in the Last Year: Not on file   Transportation Needs:     Lack of Transportation (Medical): Not on file    Lack of Transportation (Non-Medical): Not on file   Physical Activity:     Days of Exercise per Week: Not on file    Minutes of Exercise per Session: Not on file   Stress:     Feeling of Stress : Not on file   Social Connections:     Frequency of Communication with Friends and Family: Not on file    Frequency of Social Gatherings with Friends and Family: Not on file    Attends Latter day Services: Not on file    Active Member of [address] [place] [address] or Organizations: Not on file    Attends Club or Organization Meetings: Not on file    Marital Status: Not on file   Intimate Partner Violence:     Fear of Current or Ex-Partner: Not on file    Emotionally Abused: Not on file    Physically Abused: Not on file    Sexually Abused: Not on file   Housing Stability:     Unable to Pay for Housing in the Last Year: Not on file    Number of [place] in the Last Year: Not on file    Unstable Housing in the Last Year: Not on file     History reviewed. No pertinent family history. Medications & Allergies      Prior to Admission medications    Medication Sig Start Date End Date Taking? Authorizing Provider   metoprolol tartrate (LOPRESSOR) 25 MG tablet Take 25 mg by mouth 2 times daily   Yes Historical Provider, MD     Allergies: Patient has no known allergies.   IP meds : Scheduled Meds:   polyethylene glycol  17 g Oral Daily    bisacodyl  10 mg Rectal Daily    docusate sodium  100 mg Per NG tube BID    senna  5 mL Per NG tube BID    ciprofloxacin  400 mg IntraVENous Q12H    insulin lispro  0-6 Units SubCUTAneous Q6H    metoprolol tartrate  50 mg Oral BID    [Held by provider] furosemide  40 mg IntraVENous Daily    sodium chloride flush  5-40 mL IntraVENous 2 times per day    famotidine (PEPCID) injection  20 mg IntraVENous BID    [Held by provider] losartan  25 mg Oral Daily    enoxaparin  1 mg/kg SubCUTAneous Q12H    busPIRone  10 mg Oral TID    Vitamin D  2,000 Units Oral Daily    zinc sulfate  50 mg Oral Daily     Continuous Infusions:   phenylephrine (KIRBY-SYNEPHRINE) 250 mg/250 mL infusion      amiodarone 0.5 mg/min (12/27/21 0503)    cisatracurium (NIMBEX) infusion 1 mcg/kg/min (12/27/21 0503)    phenylephrine (KIRBY-SYNEPHRINE) 50mg/250mL infusion 225 mcg/min (12/27/21 0718)    ketamine (KETALAR)10 mg/mL infusion for analgosedation 0.501 mg/kg/hr (12/27/21 0503)    dextrose Stopped (12/16/21 0400)    [Held by provider] sodium chloride Stopped (12/25/21 1341)    fentaNYL (SUBLIMAZE) 1250 mcg in sodium chloride 0.9 % 250 mL 150 mcg/hr (12/27/21 0503)    midazolam 10 mg/hr (12/27/21 0503)    sodium chloride       Review of Systems Physical Exam   Review of Systems   Unable to perform ROS: Intubated    Physical Exam  Vitals reviewed. Constitutional:       General: He is not in acute distress. Appearance: He is obese. He is ill-appearing. He is not diaphoretic. HENT:      Head: Normocephalic and atraumatic. Comments: ET tube in place     Right Ear: External ear normal.      Left Ear: External ear normal.      Nose: Nose normal.      Mouth/Throat:      Mouth: Mucous membranes are moist.   Eyes:      General: No scleral icterus. Right eye: No discharge. Left eye: No discharge.       Conjunctiva/sclera: Conjunctivae normal.   Neck:      Thyroid: No thyromegaly. Vascular: No JVD. Cardiovascular:      Rate and Rhythm: Normal rate and regular rhythm. Heart sounds: Normal heart sounds. No murmur heard. Pulmonary:      Effort: Pulmonary effort is normal. No respiratory distress. Breath sounds: Normal breath sounds. No stridor. No wheezing or rales. Chest:      Chest wall: No tenderness. Abdominal:      General: Bowel sounds are normal. There is no distension. Palpations: Abdomen is soft. Tenderness: There is no abdominal tenderness. Musculoskeletal:         General: Swelling present. No tenderness. Cervical back: Normal range of motion. Right lower leg: Edema present. Left lower leg: Edema present. Skin:     General: Skin is warm and dry. Findings: No erythema or rash. Neurological:      Comments: Not responsive to verbal command   Psychiatric:      Comments: Patient is not agitated           Vitals:    12/27/21 0700   BP: (!) 146/67   Pulse: 88   Resp: 21   Temp:    SpO2:      Labs, Radiology and Tests       Recent Labs     12/25/21  0330 12/26/21  0314 12/27/21  0515   WBC 22.9* 13.3* 21.4*   RBC 3.36* 3.05* 3.35*   HGB 10.2* 9.3* 10.0*   HCT 33.0* 30.0* 32.5*   MCV 98.2* 98.4* 97.0*   MCH 30.4 30.5 29.9   MCHC 30.9* 31.0* 30.8*    140 280     Recent Labs     12/25/21  0330 12/25/21  0330 12/26/21  0314 12/26/21  1400 12/27/21  0515   *   < > 132* 134* 131*   K 4.8   < > 4.7 4.5 4.6   CL 97*   < > 96* 97* 95*   CO2 28   < > 27 25 27   BUN 38*   < > 46* 44* 50*   CREATININE 1.5*   < > 1.8* 1.9* 2.1*   CALCIUM 7.6*   < > 7.6* 7.4* 8.1*   PROT 5.3*  --  5.3*  --  6.3   LABALBU 1.9*  --  1.8*  --  2.0*   BILITOT 1.4*  --  0.8  --  0.6   ALKPHOS 191*  --  219*  --  234*   AST 74*  --  66*  --  40   ALT 88*  --  86*  --  69*    < > = values in this interval not displayed.        Radiology : Renal ultrasound scan 12/21 shows no acute abnormalities of the kidney    Chest x-ray reviewed by me shows bilateral patchy infiltrates more prominent on the left side. No significant congestion noted    Other : Old laboratory data have been reviewed and noted patient baseline creatinine probably 1-1.2    Echocardiogram-12/21 shows ejection fraction of 50% . Assessment    1 Renal -acute kidney injury, most likely secondary to some component of ATN due to low blood pressure on 12/24/2021 and now he is on pressors  ? Creatinine is getting slightly worse however seems to be stabilizing making some urine  ? Getting almost around 150 mL of fluids per hour with all the drips will concentrate the drips  ? As needed diuretics. FiO2 is going down and pressor support going down as well  ? No acute need for dialysis today however if renal function continues to worsen/volume status worsens he might need renal replacement therapy    2 Electrolytes -mild hyponatremia due to all the drips the patient is receiving . 3 Hypotension-wean pressors to MAP greater than 65  4 New onset atrial fibrillation  5 COVID-19 pneumonia currently ventilator dependent  6 Superimposed bacterial pneumonia  7 Meds reviewed and discussed with nursing staff      **This report has been created using voice recognition software. It maycontain minor  errors which are inherent in voice recognition technology. **    Agapito Rushing MD,M.D  [facility].

## 2021-12-27 NOTE — PROGRESS NOTES
Yamile[facility] 60  OCCUPATIONAL THERAPY MISSED TREATMENT NOTE  [facility] CVICU 4B  4B-11/011-A      Date: 2021  Patient Name: Luciano Aranda        CSN: [id number]   : 1955  (77 y.o.)  Gender: male   Referring Practitioner: Dr. Ordaz Marrow  Diagnosis: COVID 19         REASON FOR MISSED TREATMENT: Hold Treatment per Nursing. New orders received per order set when ETT exchanged. Pt remains intubated, Pt not appropriate for early mobility due to heavy levels of sedation. Will again discontinue orders at this time, please reorder when appropriate.

## 2021-12-28 NOTE — FLOWSHEET NOTE
[facility] 88 PROGRESS NOTE      Patient: Bello Castellanos  Room #: 4B-01/001-A            YOB: 1955  Age: 77 y.o. Gender: male            Admit Date & Time: 12/5/2021  6:18 AM    Assessment: Interventions:       Large family gathered preparing for withdrawal of care. Patient obviously much loved by family group. Patient had just retired; spouse present expressing her regrets of plans lost.  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 Chaplalin offered a prayer for the family in grief; for the presence of the spirit in comforting and sustaining; Allowed a pause for family to prayer, several did this;  Led in closing in the Geodruid meme; played a bell ringing as analogy of the crash of deaht and the remaining tones of the life's blessing. .         Outcomes:  Good response from family;  Very nice connection with the patient's nurse; she exhibits much heart and compassion. Plan:    1. Support family as possible;     Electronically signed by Joanie Elise on 12/28/2021 at 12:11 AM.  [facility]  [phone]               12/27/21 1110   Encounter Summary   Services provided to: Family; Patient not available   Referral/Consult From: Nursing Supervisor/Manager   Support System Spouse; Children;Family members   Continue Visiting No  (12/27/2021)   Complexity of Encounter High   Length of Encounter 45 minutes   Spiritual Assessment Completed Yes   Grief and Life Adjustment   Type End of life;Grief and loss   Assessment Approachable;Tearful;Grieving;Loneliness; Helplessness   Intervention Active listening;Explored feelings, thoughts, concerns;Explored coping resources;Nurtured hope;Prayer;Scripture;Ritual;Provided reading materials/devotional materials;Sustaining presence/ Ministry of presence;Grief care Outcome Connection/belonging;Comfort;Expressed gratitude;Expressed feelings/needs/concerns;Coping;Tearful;Grieving;Encouraged

## 2021-12-28 NOTE — DISCHARGE SUMMARY
Course:   Peter Del Rosario is a 77 y.o. male who presented to [facility] ER for evaluation of Covid symptoms. Onset of symptoms were noted on 11/29 and he had positive test 3 days prior. He was seen at another ER where he was started on supplemental oxygen and they wanted to admit him but did not have beds so he was ultimately discharged. Other than oxygen, he states he has not been started on any form of treatment. He has not received the Covid vaccine. He states he had sudden worsening of the shortness of breath on day of admission progressive increase in oxygen demand while at home and was hypoxic on 6 L this morning prompting his ED evaluation. He reports associated headache, sore throat, cough, loss of taste/smell, generalized body aches, dizziness, shortness of breath and intermittent chest tightness/wheezing. Over the course of his hospital stay, patient's respiratory function worsened and he was subsequently transitioned to BiPAP.  He eventually failed BiPAP.  He also developed a saddle pulmonary embolism and was started on therapeutic Lovenox and baricitinib was discontinued.  On the day of transfer to the ICU patient became profoundly hypoxic on BiPAP and was in respiratory distress.  He was transferred to the ICU for intubation 12/14/2021.  12/16/2021: Unable to wean on vent at this time.  Not requiring any pressors at this time. Noted increase in creatinine. 12/17/2021: Remaining stable on current ventilator settings.  Creatinine improved with fluids. 12/20/21: Patient requiring high FiO2 and PEEP requirements. Patient required tube extchange for cuff leak. Respiratory cultures growing enteric gram negative bacilli. Pneumonia molecular panel positive for e. Coli and Klebsiella aerogenes.   12/22/21: NPO due to vomiting and high residuals. Was able to decrease FiO2 and PEEP requirements   12/23/21: Patient continues to require significant amount of ventilatory support FiO2 and PEEP requirements.   Dipper Priyanka Kothari was stopped secondary to hypertriglyceridemia. Ketamine drip was started.    12/24/21:   Patient was found to have ventilator dyssynchrony and was paralyzed. New onset A. fib with RVR attempted cardioversion which was unsuccessful. Amiodarone drip was started. Reattempted cardioversion for the second time which was successful.     12/25/21: Patient on max sedation. Currently paralyzed for ventilator dyssynchrony.     12/26: Patient continues to require multiple sedative medications and patient is currently paralyzed. 12/27 patient developed an acute hypoxic event while turning with no improvement with maximizing FiO2 PEEP and pressure support. Pronation therapy was was attempted with no improvement. The loop and continuous Bumex drip was started. Family agreed on changing CODE STATUS to DNR CC and terminal extubation. Significant Diagnostic Studies:   12/27/2021 sodium 132 potassium 3.9 chlorides 95 CO2 20 BUN is 44 creatinine 1.9  Glucose 109  Calcium is 8.0  White count 11.4 hemoglobin 10 hematocrit 30.5 platelet count 773  ABG pH 7.12 PCO2 102's PO2 62 bicarb 33  Chest x-ray-moderately severe mixed infiltrates scattered throughout both lungs slightly worse on the right. 12/8/2021 CTA chest-a saddle pulmonary embolism is seen that extends predominantly into the left pulmonary artery system involving the left main pulmonary artery, the left upper lobe pulmonary artery, the left upper lobe segmental and subsegmental pulmonary arteries. There is suspicion for right heart strain. Diffuse groundglass opacification of both lungs with patchy areas of consolidation noted findings related to multilobar pneumonia as seen with Covid-19. Very small bilateral pulmonary effusion is seen. A 1.4 x 1.3 enhancing nodule of the liver is noted. Echocardiogram 12/8/2021 LVEF 50% with no regional wall motion abnormality.   Right ventricular size was normal with normal systolic function and wall thickness. Discharged Condition: Terminal  Disposition:       ELIAN Fabian DNP, APRN-CNP  2021    Time spent on discharge 35 minutes

## 2021-12-28 NOTE — PROGRESS NOTES
[facility]  Notice of Patient Passing      Patient Name- Ketan Shi Number- [de-identified]   Attending Physician- Sonia Fung MD    Admitted on-12/5/2021  6:18 AM     On 12/28/2021 at [id number] patient was found in 4b01 with:   Absence of vital signs. Absence of neurological response. Confirmed time of death at [phone]. Physician or On-call Physician notified of time of death- yes    Family present at time of death- yes   Spiritual care present at time of death- no    Physician was notified and orders were obtained to release the body. Post-Mortem documentation completed; form printed, signed, and given to admitting.     Dillon Calvo RN RN Nursing Supervisor/ Manager  12/28/21   1:27 AM

## 2021-12-28 NOTE — PROGRESS NOTES
Per family request stop paralytics, and turn vent off to withdraw care. Will leave fentanyl and ketamine on for comfort care.

## 2021-12-28 NOTE — SIGNIFICANT EVENT
After activity of transfer from the bedpan noted drop in SaO2 to mid 70s. Respiratory did increase pressure and FiO2 and PEEP with increase in SaO2 to 84 to 86%. Patient is currently paralyzed. Nursing actually prone patient prior to my arrival.  No improvement in SaO2 was noted. Lung sounds revealed equal breath sounds bilaterally. Patient was repositioned to supine position. Noted +20L with fairly decent results with IV push loop. I did collaborate with nephrology Dr. Gavin Ely, Nephrology and gave IV push Bumex x1 followed with continuous Bumex drip. If no improvement or response seen may need to consider some form of RRT.     Family was updated on current condition

## 2021-12-28 NOTE — PROGRESS NOTES
Narayan Joyce at [phone] to advise family has changed code status to dnrcc. Charito Valentine stated he would let Juanis Vance know.

## 2021-12-28 NOTE — PROGRESS NOTES
Time of death 12:52, asystole on the monitor. Myself and another nurse entered the room to assess, no lung or heart sounds.

## 2021-12-29 NOTE — SIGNIFICANT EVENT
Patient continues to have SaO2 of 82 to 84%. Did collaborate with Dr. Monica Alicia with recommendations to start patient on Veletri.

## 2021-12-29 NOTE — PROCEDURES
Patient Name: Izzy Mane   Medical Record Number: [id number]  Date: 12/28/2021   Time: 10:40 PM   Room/Bed: [facility]01/001-A  Arterial Line Placement Procedure Note                   Indication: Need for serial blood work, metabolic derangement, arterial blood gases, mechanical ventilation, severe hypotension, cardiovascular instability, shock and Adult Respiratory Distress Syndrome    Renato's Test: Normal    Procedure: The skin over the right radial artery was prepped with Chloraprep. Under ultrasound guidance a 4 Khmer arterial line catheter was then inserted, using a modified Seldinger technique, into the vessel. The transducer set was then attached and securely fastened to the skin with sutures. Waveforms on the monitor were observed and found to be adequate. The patient had good distal perfusion after the procedure. The site was then dressed in a sterile fashion. The patient tolerated the procedure well. Complications: None. EBL=5ml    Electronically Signed by: ELIAN Fabian DNP, APRN-CNP

## 2021-12-29 NOTE — SIGNIFICANT EVENT
Family present at bedside all in agreement to change CODE STATUS to a DNR CC and plan on terminal extubation. \"He would never want any of this \".

## 2021-12-31 LAB
BLOOD CULTURE, ROUTINE: NORMAL
BLOOD CULTURE, ROUTINE: NORMAL

## 2022-01-05 NOTE — PROGRESS NOTES
Physician Progress Note      Arias Whitten  CSN #:                  [id number]  :                       1955  ADMIT DATE:       2021 6:18 AM  DISCH DATE:        2021 3:30 AM  RESPONDING  PROVIDER #:        Whitney Gabriel CNP          QUERY TEXT:    Pt admitted with Covid-19. Pt noted to have SIRS with leukocytosis and   tachycardia. Also noted to have elevated lactic acid and CRP. The patient was   treated with Decadron and initially Baricitinib. ? The patient had a difficult   hospital course and had hypotension and worsening respiratory status. ? \"Septic   shock\" was documented on . ? The patient was on pressors and  on   . ? If possible, please document in progress notes and discharge summary   the present on admission status of sepsis:    The medical record reflects the following:    Risk Factors: Covid PNA  Clinical Indicators: on arrival WBC 14.0, PCT 1.05, Lactic 2.1, CRP 22.27,   tachycardia up to 115. Pt later in stay developed hypotension requiring   pressors, Septic shock documented in PN   Treatment: IVF bolus, IV Zithromax, IV Rocephin,  Barcitinib, IV Decadron    Thank you! Hien Mattson, CRCR  RN Clinical   P: [phone]  Options provided:  -- Sepsis due to COVID-19, present on admission with septic shock developing   after admission  -- Sepsis and septic shock due to COVID-19 developed after admission  -- Other - I will add my own diagnosis  -- Disagree - Not applicable / Not valid  -- Disagree - Clinically unable to determine / Unknown  -- Refer to Clinical Documentation Reviewer    PROVIDER RESPONSE TEXT:    Sepsis and septic shock due to COVID-19 developed after admission.     Query created by: Sheeba Leonard on 2022 9:23 AM      Electronically signed by:  Whitney Gabriel CNP 2022 6:23   PM

## 2024-07-27 NOTE — CARE COORDINATION
Discharge planning update: On BiPAP, 90% FiO2, 40 liters oxygen with saturations at 95%. Afebrile. WBC 12.8. Vitamin C,D,zinc, baricitinib, IV Zithromax, Decadron, Lovenox, Pepcid, Ativan. From home with spouse. Will follow.   Electronically signed by Esau Tong RN on 12/7/2021 at 12:23 PM Patient castro catheter placed using sterile technique. Patient tolerated 20fr castro well. Urine returned in bag. Balloon inflated with 30ml Sterile water. Bladder scanner <25ml of urine
